# Patient Record
Sex: MALE | Employment: OTHER | URBAN - METROPOLITAN AREA
[De-identification: names, ages, dates, MRNs, and addresses within clinical notes are randomized per-mention and may not be internally consistent; named-entity substitution may affect disease eponyms.]

---

## 2020-06-10 RX ORDER — DILTIAZEM HYDROCHLORIDE 240 MG/1
240 CAPSULE, COATED, EXTENDED RELEASE ORAL DAILY
Qty: 90 CAPSULE | Refills: 1 | Status: SHIPPED | OUTPATIENT
Start: 2020-06-10 | End: 2020-08-31 | Stop reason: SDUPTHER

## 2020-06-10 RX ORDER — FENOFIBRATE 160 MG/1
160 TABLET ORAL DAILY
Qty: 90 TABLET | Refills: 1 | Status: SHIPPED | OUTPATIENT
Start: 2020-06-10 | End: 2021-01-13

## 2020-06-10 RX ORDER — ROSUVASTATIN CALCIUM 20 MG/1
20 TABLET, COATED ORAL DAILY
Qty: 90 TABLET | Refills: 1 | Status: SHIPPED | OUTPATIENT
Start: 2020-06-10 | End: 2020-08-31 | Stop reason: SDUPTHER

## 2020-06-16 ENCOUNTER — OFFICE VISIT (OUTPATIENT)
Dept: FAMILY MEDICINE CLINIC | Age: 75
End: 2020-06-16
Payer: MEDICARE

## 2020-06-16 VITALS
RESPIRATION RATE: 14 BRPM | WEIGHT: 250.4 LBS | TEMPERATURE: 97.5 F | SYSTOLIC BLOOD PRESSURE: 118 MMHG | DIASTOLIC BLOOD PRESSURE: 74 MMHG | HEART RATE: 65 BPM | HEIGHT: 71 IN | OXYGEN SATURATION: 96 % | BODY MASS INDEX: 35.06 KG/M2

## 2020-06-16 PROBLEM — Z96.642 STATUS POST TOTAL HIP REPLACEMENT, LEFT: Status: ACTIVE | Noted: 2018-03-01

## 2020-06-16 PROBLEM — F10.10 ALCOHOL ABUSE: Status: ACTIVE | Noted: 2020-06-16

## 2020-06-16 PROBLEM — I10 HYPERTENSION: Status: ACTIVE | Noted: 2018-01-15

## 2020-06-16 PROBLEM — E78.5 HYPERLIPIDEMIA: Status: ACTIVE | Noted: 2018-01-15

## 2020-06-16 PROBLEM — Z79.899 ENCOUNTER FOR LONG-TERM (CURRENT) USE OF MEDICATIONS: Status: ACTIVE | Noted: 2017-11-09

## 2020-06-16 PROCEDURE — 99214 OFFICE O/P EST MOD 30 MIN: CPT | Performed by: NURSE PRACTITIONER

## 2020-06-16 PROCEDURE — 93000 ELECTROCARDIOGRAM COMPLETE: CPT | Performed by: NURSE PRACTITIONER

## 2020-06-16 RX ORDER — CHOLECALCIFEROL (VITAMIN D3) 1250 MCG
50000 TABLET ORAL DAILY
COMMUNITY
End: 2021-05-11

## 2020-06-16 RX ORDER — METOPROLOL SUCCINATE 100 MG/1
100 TABLET, EXTENDED RELEASE ORAL NIGHTLY
COMMUNITY
Start: 2017-09-30 | End: 2020-08-31 | Stop reason: SDUPTHER

## 2020-06-16 RX ORDER — SOTALOL HYDROCHLORIDE 160 MG/1
160 TABLET ORAL
COMMUNITY
Start: 2017-09-18 | End: 2020-06-19 | Stop reason: ALTCHOICE

## 2020-06-16 RX ORDER — ESCITALOPRAM OXALATE 20 MG/1
20 TABLET ORAL DAILY
COMMUNITY
Start: 2017-09-30 | End: 2020-08-31 | Stop reason: SDUPTHER

## 2020-06-16 RX ORDER — BUPROPION HYDROCHLORIDE 75 MG/1
75 TABLET ORAL DAILY
Qty: 30 TABLET | Refills: 3 | Status: SHIPPED | OUTPATIENT
Start: 2020-06-16 | End: 2020-06-21

## 2020-06-16 RX ORDER — HYDROCHLOROTHIAZIDE 12.5 MG/1
12.5 TABLET ORAL DAILY
Status: ON HOLD | COMMUNITY
End: 2020-06-23 | Stop reason: HOSPADM

## 2020-06-16 RX ORDER — AMLODIPINE BESYLATE 5 MG/1
5 TABLET ORAL DAILY
COMMUNITY
Start: 2017-09-18 | End: 2020-08-31 | Stop reason: SDUPTHER

## 2020-06-16 SDOH — SOCIAL STABILITY: SOCIAL INSECURITY: WITHIN THE LAST YEAR, HAVE YOU BEEN HUMILIATED OR EMOTIONALLY ABUSED IN OTHER WAYS BY YOUR PARTNER OR EX-PARTNER?: NO

## 2020-06-16 SDOH — SOCIAL STABILITY: SOCIAL NETWORK: HOW OFTEN DO YOU ATTENT MEETINGS OF THE CLUB OR ORGANIZATION YOU BELONG TO?: 1 TO 4 TIMES PER YEAR

## 2020-06-16 SDOH — ECONOMIC STABILITY: FOOD INSECURITY: WITHIN THE PAST 12 MONTHS, YOU WORRIED THAT YOUR FOOD WOULD RUN OUT BEFORE YOU GOT MONEY TO BUY MORE.: NEVER TRUE

## 2020-06-16 SDOH — HEALTH STABILITY: PHYSICAL HEALTH: ON AVERAGE, HOW MANY MINUTES DO YOU ENGAGE IN EXERCISE AT THIS LEVEL?: 0 MIN

## 2020-06-16 SDOH — ECONOMIC STABILITY: FOOD INSECURITY: WITHIN THE PAST 12 MONTHS, THE FOOD YOU BOUGHT JUST DIDN'T LAST AND YOU DIDN'T HAVE MONEY TO GET MORE.: NEVER TRUE

## 2020-06-16 SDOH — ECONOMIC STABILITY: TRANSPORTATION INSECURITY
IN THE PAST 12 MONTHS, HAS THE LACK OF TRANSPORTATION KEPT YOU FROM MEDICAL APPOINTMENTS OR FROM GETTING MEDICATIONS?: NO

## 2020-06-16 SDOH — SOCIAL STABILITY: SOCIAL NETWORK: ARE YOU MARRIED, WIDOWED, DIVORCED, SEPARATED, NEVER MARRIED, OR LIVING WITH A PARTNER?: DIVORCED

## 2020-06-16 SDOH — ECONOMIC STABILITY: INCOME INSECURITY: HOW HARD IS IT FOR YOU TO PAY FOR THE VERY BASICS LIKE FOOD, HOUSING, MEDICAL CARE, AND HEATING?: NOT HARD AT ALL

## 2020-06-16 SDOH — HEALTH STABILITY: MENTAL HEALTH: HOW MANY STANDARD DRINKS CONTAINING ALCOHOL DO YOU HAVE ON A TYPICAL DAY?: 1 OR 2

## 2020-06-16 SDOH — SOCIAL STABILITY: SOCIAL INSECURITY
WITHIN THE LAST YEAR, HAVE YOU BEEN KICKED, HIT, SLAPPED, OR OTHERWISE PHYSICALLY HURT BY YOUR PARTNER OR EX-PARTNER?: NO

## 2020-06-16 SDOH — SOCIAL STABILITY: SOCIAL INSECURITY
WITHIN THE LAST YEAR, HAVE TO BEEN RAPED OR FORCED TO HAVE ANY KIND OF SEXUAL ACTIVITY BY YOUR PARTNER OR EX-PARTNER?: NO

## 2020-06-16 SDOH — SOCIAL STABILITY: SOCIAL NETWORK: HOW OFTEN DO YOU ATTEND CHURCH OR RELIGIOUS SERVICES?: 1 TO 4 TIMES PER YEAR

## 2020-06-16 SDOH — ECONOMIC STABILITY: TRANSPORTATION INSECURITY
IN THE PAST 12 MONTHS, HAS LACK OF TRANSPORTATION KEPT YOU FROM MEETINGS, WORK, OR FROM GETTING THINGS NEEDED FOR DAILY LIVING?: NO

## 2020-06-16 SDOH — HEALTH STABILITY: MENTAL HEALTH: HOW OFTEN DO YOU HAVE A DRINK CONTAINING ALCOHOL?: MONTHLY OR LESS

## 2020-06-16 SDOH — HEALTH STABILITY: PHYSICAL HEALTH: ON AVERAGE, HOW MANY DAYS PER WEEK DO YOU ENGAGE IN MODERATE TO STRENUOUS EXERCISE (LIKE A BRISK WALK)?: 0 DAYS

## 2020-06-16 SDOH — SOCIAL STABILITY: SOCIAL NETWORK
DO YOU BELONG TO ANY CLUBS OR ORGANIZATIONS SUCH AS CHURCH GROUPS UNIONS, FRATERNAL OR ATHLETIC GROUPS, OR SCHOOL GROUPS?: YES

## 2020-06-16 SDOH — SOCIAL STABILITY: SOCIAL INSECURITY: WITHIN THE LAST YEAR, HAVE YOU BEEN AFRAID OF YOUR PARTNER OR EX-PARTNER?: NO

## 2020-06-16 SDOH — HEALTH STABILITY: MENTAL HEALTH
STRESS IS WHEN SOMEONE FEELS TENSE, NERVOUS, ANXIOUS, OR CAN'T SLEEP AT NIGHT BECAUSE THEIR MIND IS TROUBLED. HOW STRESSED ARE YOU?: RATHER MUCH

## 2020-06-16 SDOH — SOCIAL STABILITY: SOCIAL NETWORK: HOW OFTEN DO YOU GET TOGETHER WITH FRIENDS OR RELATIVES?: ONCE A WEEK

## 2020-06-16 SDOH — SOCIAL STABILITY: SOCIAL NETWORK: IN A TYPICAL WEEK, HOW MANY TIMES DO YOU TALK ON THE PHONE WITH FAMILY, FRIENDS, OR NEIGHBORS?: ONCE A WEEK

## 2020-06-16 ASSESSMENT — PATIENT HEALTH QUESTIONNAIRE - PHQ9
SUM OF ALL RESPONSES TO PHQ QUESTIONS 1-9: 0
2. FEELING DOWN, DEPRESSED OR HOPELESS: 0
1. LITTLE INTEREST OR PLEASURE IN DOING THINGS: 0
SUM OF ALL RESPONSES TO PHQ9 QUESTIONS 1 & 2: 0
SUM OF ALL RESPONSES TO PHQ QUESTIONS 1-9: 0
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
## 2020-06-16 NOTE — PATIENT INSTRUCTIONS
It was my pleasure to meet with you today. Please contact me with any questions or concerns, and please notify myself or our manger if there is anyway we can improve our service in your health care needs. Below I have listed some instructions and information that pertain to today's visit.    -You have been advised to continue all current medication, otherwise not discussed in today's visit  -New medications and refills will been sent and made available at pharmacy or mail away  -Heathy daily diet to include healthy balanced diet with good portions of lean meats and vegetables  -Drink 6-8 glasses of water daily  -Continue daily exercise with in your physical capacity  -Continue regular follow up's with specialist including but not limited to: Cardiology  -Check your blood pressure as discussed      Patient Education        rivaroxaban  Pronunciation:  GIN a JOSE a ban  Brand:  Xarelto  What is the most important information I should know about rivaroxaban? Do not stop taking rivaroxaban without first talking to your doctor. Stopping suddenly can increase your risk of blood clot or stroke. Call your doctor at once if you have signs of bleeding such as: headaches, feeling very weak or dizzy, bleeding gums, nosebleeds, heavy menstrual periods or abnormal vaginal bleeding, blood in your urine, bloody or tarry stools, coughing up blood or vomit that looks like coffee grounds. Many other drugs can increase your risk of bleeding when used with rivaroxaban. Tell your doctor about all medicines you have recently used. Rivaroxaban can cause a very serious blood clot around your spinal cord if you undergo a spinal tap or receive spinal anesthesia (epidural). Tell any doctor who treats you that you are taking rivaroxaban. What is rivaroxaban? Rivaroxaban is used to treat or prevent blood clots in the legs (deep vein thrombosis) or in the lungs (pulmonary embolism).  These types of blood clots can occur after knee or hip replacement surgery, and during or after a stay in the hospital when you cannot move around as much as normal.  Rivaroxaban is sometimes used to lower your risk of a blood clot coming back after you have received treatment for blood clots for at least 6 months. Rivaroxaban is also used in people with atrial fibrillation (a heart rhythm disorder) to lower the risk of stroke caused by a blood clot. Rivaroxaban is also given together with aspirin to lower the risk of stroke, heart attack, or other serious heart problems in people with coronary artery disease (decreased blood flow to the heart) or peripheral artery disease (decreased blood flow to the legs). Rivaroxaban may also be used for purposes not listed in this medication guide. What should I discuss with my healthcare provider before taking rivaroxaban? You should not use rivaroxaban if you are allergic to it, or if you have active or uncontrolled bleeding. Rivaroxaban can cause a very serious blood clot around your spinal cord if you undergo a spinal tap or receive spinal anesthesia (epidural). This type of blood clot could cause long-term paralysis, and may be more likely to occur if:    · you have a genetic spinal defect;  · you have a spinal catheter in place;  · you have a history of spinal surgery or repeated spinal taps;  · you have recently had a spinal tap or epidural anesthesia;  · you are taking an NSAID--Advil, Aleve, Motrin, and others; or  · you are using other medicines to treat or prevent blood clots. Rivaroxaban may cause you to bleed more easily, especially if you have:   · a bleeding disorder that is inherited or caused by disease;  · hemorrhagic stroke;  · uncontrolled high blood pressure;  · stomach or intestinal bleeding or ulcer; or  · if you take certain medicines such as aspirin, enoxaparin, heparin, warfarin (Coumadin, Rafa Madelin), clopidogrel (Plavix), or certain antidepressants.   Tell your doctor if you have ever had:  · antiphospholipid syndrome (also called Villafana syndrome or \"sticky blood syndrome\"), an immune system disorder that increases the risk of blood clots;  · an artificial heart valve; or  · liver or kidney disease. Taking rivaroxaban during pregnancy may cause bleeding in the mother or the unborn baby. Tell your doctor if you are pregnant or plan to become pregnant. It may not be safe to breastfeed a baby while you are using this medicine. Ask your doctor about any risks. How should I take rivaroxaban? Follow all directions on your prescription label and read all medication guides or instruction sheets. Your doctor may occasionally change your dose. Use the medicine exactly as directed. The number of times you take rivaroxaban each day will depend on the reason you are using this medication. For some conditions, rivaroxaban should be taken with food. Whether you take the medicine with or without food may also depend on the tablet strength you take. Follow your doctor's dosing instructions very carefully. Tell your doctor if you have trouble swallowing a rivaroxaban tablet. Tell any doctor who treats you that you are using rivaroxaban. If you need surgery or dental work, tell the surgeon or dentist ahead of time that you are using this medication. If you need anesthesia for a medical procedure or surgery, you may need to stop using rivaroxaban for a short time. Do not change your dose or stop taking this medication without first talking to your doctor. Stopping suddenly can increase your risk of blood clot or stroke. Store at room temperature away from moisture and heat. What happens if I miss a dose? If you take rivaroxaban 1 time each day: Take the medicine as soon as you remember, and then go back to your regular schedule. Do not take two doses in the same day. If you take the 2.5-milligram tablet 2 times each day: Skip the missed dose and take your next dose at the regular time.   If you take the the medications less effective. Other drugs may affect rivaroxaban, including prescription and over-the-counter medicines, vitamins, and herbal products. Tell your doctor about all your current medicines and any medicine you start or stop using. Where can I get more information? Your pharmacist can provide more information about rivaroxaban. Remember, keep this and all other medicines out of the reach of children, never share your medicines with others, and use this medication only for the indication prescribed. Every effort has been made to ensure that the information provided by [address] is accurate, up-to-date, and complete, but no guarantee is made to that effect. Drug information contained herein may be time sensitive. [facility] information has been compiled for use by healthcare practitioners and consumers in the [place] and therefore [facility] does not warrant that uses outside of the [place] are appropriate, unless specifically indicated otherwise. [facility]'s drug information does not endorse drugs, diagnose patients or recommend therapy. [facility] drug information is an informational resource designed to assist licensed healthcare practitioners in caring for their patients and/or to serve consumers viewing this service as a supplement to, and not a substitute for, the expertise, skill, knowledge and judgment of healthcare practitioners. The absence of a warning for a given drug or drug combination in no way should be construed to indicate that the drug or drug combination is safe, effective or appropriate for any given patient. [facility] does not assume any responsibility for any aspect of healthcare administered with the aid of information [facility] provides. The information contained herein is not intended to cover all possible uses, directions, precautions, warnings, drug interactions, allergic reactions, or adverse effects.  If you have questions about the drugs you are taking, check with your doctor, nurse or pharmacist.  Copyright 1945-3571 [facility] [employer]. Version: 7.02. Revision date: 10/24/2019. Care instructions adapted under license by [facility] ([facility]). If you have questions about a medical condition or this instruction, always ask your healthcare professional. [place] [address] any warranty or liability for your use of this information.

## 2020-06-19 PROBLEM — Z98.890 STATUS POST CATHETER ABLATION OF ATRIAL FIBRILLATION: Status: ACTIVE | Noted: 2020-06-19

## 2020-06-19 PROBLEM — Z79.01 LONG TERM (CURRENT) USE OF ANTICOAGULANTS: Status: ACTIVE | Noted: 2020-06-19

## 2020-06-19 PROBLEM — Z79.899 LONG TERM CURRENT USE OF ANTIARRHYTHMIC DRUG: Status: ACTIVE | Noted: 2020-06-19

## 2020-06-21 ENCOUNTER — NURSE TRIAGE (OUTPATIENT)
Dept: OTHER | Age: 75
End: 2020-06-21

## 2020-06-21 ENCOUNTER — APPOINTMENT (OUTPATIENT)
Dept: GENERAL RADIOLOGY | Age: 75
DRG: 293 | End: 2020-06-21
Payer: MEDICARE

## 2020-06-21 ENCOUNTER — HOSPITAL ENCOUNTER (INPATIENT)
Age: 75
LOS: 2 days | Discharge: HOME | DRG: 293 | End: 2020-06-23
Attending: SPECIALIST | Admitting: INTERNAL MEDICINE
Payer: MEDICARE

## 2020-06-21 ENCOUNTER — APPOINTMENT (OUTPATIENT)
Dept: CT IMAGING | Age: 75
DRG: 293 | End: 2020-06-21
Payer: MEDICARE

## 2020-06-21 PROBLEM — R09.02 HYPOXIA: Status: ACTIVE | Noted: 2020-06-21

## 2020-06-21 PROBLEM — I50.9 ACUTE CHF (CONGESTIVE HEART FAILURE) (HCC): Status: ACTIVE | Noted: 2020-06-21

## 2020-06-21 PROBLEM — J18.9 PNEUMONIA OF BOTH LOWER LOBES DUE TO INFECTIOUS ORGANISM: Status: ACTIVE | Noted: 2020-06-21

## 2020-06-21 LAB
ABSOLUTE EOS #: 0 K/UL (ref 0–0.4)
ABSOLUTE IMMATURE GRANULOCYTE: ABNORMAL K/UL (ref 0–0.3)
ABSOLUTE LYMPH #: 2.1 K/UL (ref 1–4.8)
ABSOLUTE MONO #: 0.8 K/UL (ref 0.1–1.2)
ALBUMIN SERPL-MCNC: 3.1 G/DL (ref 3.5–5.2)
ALBUMIN/GLOBULIN RATIO: 1.2 (ref 1–2.5)
ALP BLD-CCNC: 81 U/L (ref 40–129)
ALT SERPL-CCNC: 19 U/L (ref 5–41)
ANION GAP SERPL CALCULATED.3IONS-SCNC: 15 MMOL/L (ref 9–17)
AST SERPL-CCNC: 32 U/L
BASOPHILS # BLD: 0 % (ref 0–2)
BASOPHILS ABSOLUTE: 0 K/UL (ref 0–0.2)
BILIRUB SERPL-MCNC: 0.67 MG/DL (ref 0.3–1.2)
BNP INTERPRETATION: ABNORMAL
BUN BLDV-MCNC: 21 MG/DL (ref 8–23)
BUN/CREAT BLD: ABNORMAL (ref 9–20)
CALCIUM SERPL-MCNC: 8.9 MG/DL (ref 8.6–10.4)
CHLORIDE BLD-SCNC: 97 MMOL/L (ref 98–107)
CO2: 20 MMOL/L (ref 20–31)
CREAT SERPL-MCNC: 0.93 MG/DL (ref 0.7–1.2)
D-DIMER QUANTITATIVE: 0.6 MG/L FEU
DIFFERENTIAL TYPE: ABNORMAL
EOSINOPHILS RELATIVE PERCENT: 0 % (ref 1–4)
GFR AFRICAN AMERICAN: >60 ML/MIN
GFR NON-AFRICAN AMERICAN: >60 ML/MIN
GFR SERPL CREATININE-BSD FRML MDRD: ABNORMAL ML/MIN/{1.73_M2}
GFR SERPL CREATININE-BSD FRML MDRD: ABNORMAL ML/MIN/{1.73_M2}
GLUCOSE BLD-MCNC: 136 MG/DL (ref 70–99)
HCT VFR BLD CALC: 39.8 % (ref 41–53)
HEMOGLOBIN: 13.6 G/DL (ref 13.5–17.5)
IMMATURE GRANULOCYTES: ABNORMAL %
LACTIC ACID, WHOLE BLOOD: ABNORMAL MMOL/L (ref 0.7–2.1)
LACTIC ACID: 2.3 MMOL/L (ref 0.5–2.2)
LYMPHOCYTES # BLD: 20 % (ref 24–44)
MCH RBC QN AUTO: 37.2 PG (ref 26–34)
MCHC RBC AUTO-ENTMCNC: 34.1 G/DL (ref 31–37)
MCV RBC AUTO: 108.9 FL (ref 80–100)
MONOCYTES # BLD: 8 % (ref 2–11)
NRBC AUTOMATED: ABNORMAL PER 100 WBC
PDW BLD-RTO: 14.7 % (ref 12.5–15.4)
PLATELET # BLD: 239 K/UL (ref 140–450)
PLATELET ESTIMATE: ABNORMAL
PMV BLD AUTO: 10 FL (ref 6–12)
POTASSIUM SERPL-SCNC: 3.9 MMOL/L (ref 3.7–5.3)
PRO-BNP: 3628 PG/ML
RBC # BLD: 3.65 M/UL (ref 4.5–5.9)
RBC # BLD: ABNORMAL 10*6/UL
SARS-COV-2, PCR: NORMAL
SARS-COV-2, RAPID: NOT DETECTED
SARS-COV-2: NORMAL
SEG NEUTROPHILS: 72 % (ref 36–66)
SEGMENTED NEUTROPHILS ABSOLUTE COUNT: 7.4 K/UL (ref 1.8–7.7)
SODIUM BLD-SCNC: 132 MMOL/L (ref 135–144)
SOURCE: NORMAL
TOTAL PROTEIN: 5.6 G/DL (ref 6.4–8.3)
TROPONIN INTERP: NORMAL
TROPONIN T: NORMAL NG/ML
TROPONIN, HIGH SENSITIVITY: 21 NG/L (ref 0–22)
WBC # BLD: 10.3 K/UL (ref 3.5–11)
WBC # BLD: ABNORMAL 10*3/UL

## 2020-06-21 PROCEDURE — 71260 CT THORAX DX C+: CPT

## 2020-06-21 PROCEDURE — 85025 COMPLETE CBC W/AUTO DIFF WBC: CPT

## 2020-06-21 PROCEDURE — 2580000003 HC RX 258: Performed by: NURSE PRACTITIONER

## 2020-06-21 PROCEDURE — 2060000000 HC ICU INTERMEDIATE R&B

## 2020-06-21 PROCEDURE — U0002 COVID-19 LAB TEST NON-CDC: HCPCS

## 2020-06-21 PROCEDURE — 6360000002 HC RX W HCPCS: Performed by: SPECIALIST

## 2020-06-21 PROCEDURE — 83880 ASSAY OF NATRIURETIC PEPTIDE: CPT

## 2020-06-21 PROCEDURE — 85379 FIBRIN DEGRADATION QUANT: CPT

## 2020-06-21 PROCEDURE — 93005 ELECTROCARDIOGRAM TRACING: CPT | Performed by: SPECIALIST

## 2020-06-21 PROCEDURE — 6360000004 HC RX CONTRAST MEDICATION: Performed by: SPECIALIST

## 2020-06-21 PROCEDURE — 87040 BLOOD CULTURE FOR BACTERIA: CPT

## 2020-06-21 PROCEDURE — 36415 COLL VENOUS BLD VENIPUNCTURE: CPT

## 2020-06-21 PROCEDURE — 80053 COMPREHEN METABOLIC PANEL: CPT

## 2020-06-21 PROCEDURE — 99285 EMERGENCY DEPT VISIT HI MDM: CPT

## 2020-06-21 PROCEDURE — 6360000002 HC RX W HCPCS: Performed by: NURSE PRACTITIONER

## 2020-06-21 PROCEDURE — 6360000002 HC RX W HCPCS

## 2020-06-21 PROCEDURE — 2580000003 HC RX 258: Performed by: SPECIALIST

## 2020-06-21 PROCEDURE — 71045 X-RAY EXAM CHEST 1 VIEW: CPT

## 2020-06-21 PROCEDURE — 83605 ASSAY OF LACTIC ACID: CPT

## 2020-06-21 PROCEDURE — 84145 PROCALCITONIN (PCT): CPT

## 2020-06-21 PROCEDURE — 84484 ASSAY OF TROPONIN QUANT: CPT

## 2020-06-21 PROCEDURE — 6370000000 HC RX 637 (ALT 250 FOR IP): Performed by: NURSE PRACTITIONER

## 2020-06-21 RX ORDER — ASPIRIN 81 MG/1
81 TABLET ORAL DAILY
COMMUNITY
End: 2021-05-11 | Stop reason: ALTCHOICE

## 2020-06-21 RX ORDER — ACETAMINOPHEN 325 MG/1
650 TABLET ORAL EVERY 6 HOURS PRN
Status: DISCONTINUED | OUTPATIENT
Start: 2020-06-21 | End: 2020-06-23 | Stop reason: HOSPADM

## 2020-06-21 RX ORDER — DILTIAZEM HYDROCHLORIDE 120 MG/1
240 CAPSULE, COATED, EXTENDED RELEASE ORAL DAILY
Status: DISCONTINUED | OUTPATIENT
Start: 2020-06-22 | End: 2020-06-23 | Stop reason: HOSPADM

## 2020-06-21 RX ORDER — SODIUM CHLORIDE 0.9 % (FLUSH) 0.9 %
10 SYRINGE (ML) INJECTION EVERY 12 HOURS SCHEDULED
Status: DISCONTINUED | OUTPATIENT
Start: 2020-06-21 | End: 2020-06-23 | Stop reason: HOSPADM

## 2020-06-21 RX ORDER — 0.9 % SODIUM CHLORIDE 0.9 %
80 INTRAVENOUS SOLUTION INTRAVENOUS ONCE
Status: COMPLETED | OUTPATIENT
Start: 2020-06-21 | End: 2020-06-21

## 2020-06-21 RX ORDER — IPRATROPIUM BROMIDE AND ALBUTEROL SULFATE 2.5; .5 MG/3ML; MG/3ML
1 SOLUTION RESPIRATORY (INHALATION)
Status: DISCONTINUED | OUTPATIENT
Start: 2020-06-22 | End: 2020-06-22

## 2020-06-21 RX ORDER — SODIUM CHLORIDE 0.9 % (FLUSH) 0.9 %
10 SYRINGE (ML) INJECTION PRN
Status: DISCONTINUED | OUTPATIENT
Start: 2020-06-21 | End: 2020-06-23 | Stop reason: HOSPADM

## 2020-06-21 RX ORDER — AZITHROMYCIN 500 MG/1
INJECTION, POWDER, LYOPHILIZED, FOR SOLUTION INTRAVENOUS
Status: COMPLETED
Start: 2020-06-21 | End: 2020-06-21

## 2020-06-21 RX ORDER — ACETAMINOPHEN 650 MG/1
650 SUPPOSITORY RECTAL EVERY 6 HOURS PRN
Status: DISCONTINUED | OUTPATIENT
Start: 2020-06-21 | End: 2020-06-23 | Stop reason: HOSPADM

## 2020-06-21 RX ORDER — METOPROLOL SUCCINATE 100 MG/1
100 TABLET, EXTENDED RELEASE ORAL NIGHTLY
Status: DISCONTINUED | OUTPATIENT
Start: 2020-06-22 | End: 2020-06-23 | Stop reason: HOSPADM

## 2020-06-21 RX ORDER — PROMETHAZINE HYDROCHLORIDE 25 MG/1
12.5 TABLET ORAL EVERY 6 HOURS PRN
Status: DISCONTINUED | OUTPATIENT
Start: 2020-06-21 | End: 2020-06-23 | Stop reason: HOSPADM

## 2020-06-21 RX ORDER — AMLODIPINE BESYLATE 5 MG/1
5 TABLET ORAL DAILY
Status: DISCONTINUED | OUTPATIENT
Start: 2020-06-22 | End: 2020-06-23 | Stop reason: HOSPADM

## 2020-06-21 RX ORDER — ONDANSETRON 2 MG/ML
4 INJECTION INTRAMUSCULAR; INTRAVENOUS EVERY 6 HOURS PRN
Status: DISCONTINUED | OUTPATIENT
Start: 2020-06-21 | End: 2020-06-22

## 2020-06-21 RX ORDER — ESCITALOPRAM OXALATE 10 MG/1
20 TABLET ORAL DAILY
Status: DISCONTINUED | OUTPATIENT
Start: 2020-06-22 | End: 2020-06-23 | Stop reason: HOSPADM

## 2020-06-21 RX ORDER — ALBUTEROL SULFATE 2.5 MG/3ML
2.5 SOLUTION RESPIRATORY (INHALATION)
Status: DISCONTINUED | OUTPATIENT
Start: 2020-06-21 | End: 2020-06-23 | Stop reason: HOSPADM

## 2020-06-21 RX ORDER — NICOTINE 21 MG/24HR
1 PATCH, TRANSDERMAL 24 HOURS TRANSDERMAL DAILY PRN
Status: DISCONTINUED | OUTPATIENT
Start: 2020-06-21 | End: 2020-06-23 | Stop reason: HOSPADM

## 2020-06-21 RX ORDER — FENOFIBRATE 54 MG/1
160 TABLET ORAL DAILY
Status: DISCONTINUED | OUTPATIENT
Start: 2020-06-22 | End: 2020-06-23 | Stop reason: HOSPADM

## 2020-06-21 RX ORDER — POLYETHYLENE GLYCOL 3350 17 G/17G
17 POWDER, FOR SOLUTION ORAL DAILY PRN
Status: DISCONTINUED | OUTPATIENT
Start: 2020-06-21 | End: 2020-06-23 | Stop reason: HOSPADM

## 2020-06-21 RX ORDER — FUROSEMIDE 10 MG/ML
40 INJECTION INTRAMUSCULAR; INTRAVENOUS ONCE
Status: COMPLETED | OUTPATIENT
Start: 2020-06-21 | End: 2020-06-21

## 2020-06-21 RX ORDER — CHOLECALCIFEROL (VITAMIN D3) 125 MCG
500 CAPSULE ORAL DAILY
COMMUNITY
End: 2022-07-26 | Stop reason: SDUPTHER

## 2020-06-21 RX ORDER — ROSUVASTATIN CALCIUM 20 MG/1
20 TABLET, COATED ORAL DAILY
Status: DISCONTINUED | OUTPATIENT
Start: 2020-06-21 | End: 2020-06-23 | Stop reason: HOSPADM

## 2020-06-21 RX ADMIN — CEFTRIAXONE SODIUM 1 G: 1 INJECTION, POWDER, FOR SOLUTION INTRAMUSCULAR; INTRAVENOUS at 18:42

## 2020-06-21 RX ADMIN — APIXABAN 5 MG: 5 TABLET, FILM COATED ORAL at 21:48

## 2020-06-21 RX ADMIN — SODIUM CHLORIDE 80 ML: 9 INJECTION, SOLUTION INTRAVENOUS at 16:00

## 2020-06-21 RX ADMIN — Medication 10 ML: at 21:58

## 2020-06-21 RX ADMIN — IOVERSOL 75 ML: 741 INJECTION INTRA-ARTERIAL; INTRAVENOUS at 16:00

## 2020-06-21 RX ADMIN — FUROSEMIDE 40 MG: 10 INJECTION, SOLUTION INTRAMUSCULAR; INTRAVENOUS at 21:48

## 2020-06-21 RX ADMIN — Medication 10 ML: at 16:01

## 2020-06-21 RX ADMIN — AZITHROMYCIN MONOHYDRATE 500 MG: 500 INJECTION, POWDER, LYOPHILIZED, FOR SOLUTION INTRAVENOUS at 21:47

## 2020-06-21 ASSESSMENT — PAIN SCALES - GENERAL: PAINLEVEL_OUTOF10: 0

## 2020-06-21 NOTE — ED NOTES
Report called to floor. Advised that Rocephin is hanging and that Zithromax is due after.        Arlene Winters RN  06/21/20 Yash Malin RN  06/21/20 [id number]

## 2020-06-21 NOTE — ED NOTES
Male patient to ED via w/c with shortness of breath and atrial fib (known), relates to feeling short of breath for the last 4 days, saw PCP on Tuesday and they stopped his Solodol, saw cardiologist on Friday and they started him on a blood thinner, history of cardiac ablation 7 years ago, denies chest pain or lower extremity edema,, relates to chronic cough for the last 10 years, resp slightly labored, skin pale warm and dry, patient is calm and cooperative, here for evaluation      Mars Bolanos RN  06/21/20 [id number]

## 2020-06-21 NOTE — ED NOTES
Pt denies chest pain at this time but has increasing trouble breathing.   pt assisted to more comfortable position     Shelley Art RN  06/21/20 [id number]

## 2020-06-21 NOTE — TELEPHONE ENCOUNTER
Patient has been discontinued off his Sotalol, and started on Eliquis per cardiology. Due to worsening symptoms over weekend patient has been directed to go to [facility] ER for evaluation. He will also hold any further Wellbutrin at this time and will discuss when cardiac issues resolved.

## 2020-06-21 NOTE — ED NOTES
Resting quietly on cart, denies any shortness of breath at this time, skin color pink warm and dry, denies any needs at this time     Luke Zavala RN  06/21/20 [id number]

## 2020-06-21 NOTE — ED PROVIDER NOTES
[address]      Pt Name: Noemi Reich  MRN: [id number]  [place] 1945  Date of evaluation: 6/21/20      CHIEF COMPLAINT       Chief Complaint   Patient presents with    Shortness of Breath         HISTORY OF PRESENT ILLNESS    Noemi Reich is a 76 y.o. male who presents to the emergency department for evaluation of shortness of breath and atrial fibrillation. Patient has been having shortness of breath upon mild to moderate exertion for last 4 days which has been progressively worsening. He admits to having cough which is chronic in nature and patient attributes to post sinus drainage. He denies any chest pain, abdominal pain, swelling in the legs or calf pain. He denies any lightheadedness, dizziness, palpitations or diaphoresis. Patient has history of atrial fibrillation and has undergone ablation about 7 years ago. He was seen by his primary care physician 5 days ago when sotalol was discontinued and patient was asked to follow-up with his cardiologist Dr. Larry Turpin. Patient saw his cardiologist 2 days ago when he was started on Eliquis twice daily. Patient denies any history of asthma or COPD and does not use any oxygen at home. Patient is non-smoker. REVIEW OF SYSTEMS       Review of Systems    All systems reviewed and negative unless noted in HPI. The patient denies fever or constitutional symptoms. Denies vision change. Denies any sore throat or rhinorrhea. Denies any neck pain or stiffness. Denies chest pain, palpitations or diaphoresis. No nausea,  vomiting or diarrhea. Denies any dysuria. Denies urinary frequency or hematuria. Denies musculoskeletal injury or pain. Denies any weakness, numbness or focal neurologic deficit. Denies any skin rash or edema. No recent psychiatric issues. No easy bruising or bleeding. Denies any polyuria, polydypsia or history of immunocompromise.       PAST MEDICAL HISTORY    has a past medical history of A-fib ([facility] [address] Anxiety, Arrhythmia, Depression, Hyperlipidemia, Hypertension, and Obesity. SURGICAL HISTORY      has a past surgical history that includes joint replacement (Left); Tonsillectomy; and Atrial ablation surgery (2013). CURRENT MEDICATIONS       Previous Medications    AMLODIPINE (NORVASC) 5 MG TABLET    Take 5 mg by mouth daily    APIXABAN (ELIQUIS) 5 MG TABS TABLET    Take 1 tablet by mouth 2 times daily    CHOLECALCIFEROL (DIALYVITE VITAMIN D3 MAX) 1.25 MG (30680 UT) TABS    Take 50,000 Units by mouth daily    DILTIAZEM (CARDIZEM CD) 240 MG EXTENDED RELEASE CAPSULE    Take 1 capsule by mouth daily    ESCITALOPRAM (LEXAPRO) 20 MG TABLET    Take 20 mg by mouth daily    FENOFIBRATE (TRIGLIDE) 160 MG TABLET    Take 1 tablet by mouth daily    HYDROCHLOROTHIAZIDE (HYDRODIURIL) 12.5 MG TABLET    Take 12.5 mg by mouth daily    METOPROLOL SUCCINATE (TOPROL XL) 100 MG EXTENDED RELEASE TABLET    Take 100 mg by mouth nightly    ROSUVASTATIN (CRESTOR) 20 MG TABLET    Take 1 tablet by mouth daily       ALLERGIES     has No Known Allergies. FAMILY HISTORY     He indicated that his mother is . He indicated that his father is . He indicated that his sister is alive. He indicated that all of his three brothers are alive. He indicated that his maternal grandmother is . He indicated that his maternal grandfather is . He indicated that his paternal grandmother is . He indicated that his paternal grandfather is . family history includes No Known Problems in his father; Stroke in his mother; Thyroid Disease in his mother. SOCIAL HISTORY      reports that he has never smoked. He has never used smokeless tobacco. He reports current alcohol use. He reports that he does not use drugs. PHYSICAL EXAM     INITIAL VITALS:  height is 5' 11\" (1.803 m) and weight is 113.4 kg (250 lb). His oral temperature is 97.3 °F (36.3 °C).  His blood pressure is acute abnormality in the visualized upper abdomen, although evaluation is limited by motion artifact. Soft Tissues/Bones: No acute osseous or soft tissue abnormality. Moderate degenerative changes in the thoracic spine. 1. No evidence of pulmonary embolism. 2. Small layering bilateral pleural effusions with bilateral lower lobe atelectasis and patchy consolidative opacities concerning for bilateral lower lobe pneumonia. Imaging features are atypical or uncommonly reported for viral pneumonia. Alternative diagnoses should be considered.  PneAty         LABS:  Results for orders placed or performed during the hospital encounter of 06/21/20   CBC Auto Differential   Result Value Ref Range    WBC 10.3 3.5 - 11.0 k/uL    RBC 3.65 (L) 4.5 - 5.9 m/uL    Hemoglobin 13.6 13.5 - 17.5 g/dL    Hematocrit 39.8 (L) 41 - 53 %    .9 (H) 80 - 100 fL    MCH 37.2 (H) 26 - 34 pg    MCHC 34.1 31 - 37 g/dL    RDW 14.7 12.5 - 15.4 %    Platelets 094 218 - 682 k/uL    MPV 10.0 6.0 - 12.0 fL    NRBC Automated NOT REPORTED per 100 WBC    Differential Type NOT REPORTED     Seg Neutrophils 72 (H) 36 - 66 %    Lymphocytes 20 (L) 24 - 44 %    Monocytes 8 2 - 11 %    Eosinophils % 0 (L) 1 - 4 %    Basophils 0 0 - 2 %    Immature Granulocytes NOT REPORTED 0 %    Segs Absolute 7.40 1.8 - 7.7 k/uL    Absolute Lymph # 2.10 1.0 - 4.8 k/uL    Absolute Mono # 0.80 0.1 - 1.2 k/uL    Absolute Eos # 0.00 0.0 - 0.4 k/uL    Basophils Absolute 0.00 0.0 - 0.2 k/uL    Absolute Immature Granulocyte NOT REPORTED 0.00 - 0.30 k/uL    WBC Morphology NOT REPORTED     RBC Morphology NOT REPORTED     Platelet Estimate NOT REPORTED    Comprehensive Metabolic Panel w/ Reflex to MG   Result Value Ref Range    Glucose 136 (H) 70 - 99 mg/dL    BUN 21 8 - 23 mg/dL    CREATININE 0.93 0.70 - 1.20 mg/dL    Bun/Cre Ratio NOT REPORTED 9 - 20    Calcium 8.9 8.6 - 10.4 mg/dL    Sodium 132 (L) 135 - 144 mmol/L    Potassium 3.9 3.7 - 5.3 mmol/L    Chloride 97 (L) 98 - 107 mmol/L    CO2 20 20 - 31 mmol/L    Anion Gap 15 9 - 17 mmol/L    Alkaline Phosphatase 81 40 - 129 U/L    ALT 19 5 - 41 U/L    AST 32 <40 U/L    Total Bilirubin 0.67 0.3 - 1.2 mg/dL    Total Protein 5.6 (L) 6.4 - 8.3 g/dL    Alb 3.1 (L) 3.5 - 5.2 g/dL    Albumin/Globulin Ratio 1.2 1.0 - 2.5    GFR Non-African American >60 >60 mL/min    GFR African American >60 >60 mL/min    GFR Comment          GFR Staging NOT REPORTED    Troponin   Result Value Ref Range    Troponin, High Sensitivity 21 0 - 22 ng/L    Troponin T NOT REPORTED <0.03 ng/mL    Troponin Interp NOT REPORTED    Brain Natriuretic Peptide   Result Value Ref Range    Pro-BNP 3,628 (H) <300 pg/mL    BNP Interpretation Pro-BNP Reference Range:    D-Dimer, Quantitative   Result Value Ref Range    D-Dimer, Quant 0.60 mg/L FEU   COVID-19   Result Value Ref Range    SARS-CoV-2          SARS-CoV-2, Rapid Not Detected Not Detected    Source . NASOPHARYNGEAL SWAB     SARS-CoV-2, PCR             Patient has mild hyponatremia, elevated BNP and d-dimer. EMERGENCY DEPARTMENT COURSE:   Vitals:    Vitals:    06/21/20 1414   BP: (!) 147/90   Pulse: 63   Resp: 24   Temp: 97.3 °F (36.3 °C)   TempSrc: Oral   SpO2: (!) 89%   Weight: 113.4 kg (250 lb)   Height: 5' 11\" (1.803 m)     -------------------------  BP: (!) 147/90, Temp: 97.3 °F (36.3 °C), Pulse: 63, Resp: 24    Orders Placed This Encounter   Medications    ioversol (OPTIRAY) 74 % injection 75 mL    0.9 % sodium chloride bolus    sodium chloride flush 0.9 % injection 10 mL    AND Linked Order Group     cefTRIAXone (ROCEPHIN) 1 g IVPB in 50 mL D5W minibag     azithromycin (ZITHROMAX) 500 mg in dextrose 5% 250 mL IVPB         During the emergency department course, patient was put on the monitor and supplement oxygen per nasal cannula. Saline Hep-Lock was started. After blood cultures were obtained, patient was given Rocephin and azithromycin IV piggyback.   I discussed the case with nurse practitioner covering for hospitalist group who kindly accepted the patient to be admitted on stepdown unit. Admission condition is stable. CONSULTS:  Nurse practitioner covering for hospitalist group    PROCEDURES:  None    FINAL IMPRESSION      1. Pneumonia due to organism          DISPOSITION/PLAN       PATIENT REFERRED TO:  Lucia Pena, APRN - CNP  [address] [facility] [id number]            DISCHARGE MEDICATIONS:  New Prescriptions    No medications on file       (Please note that portions of this note were completed with a voice recognition program.  Efforts were made to edit the dictations but occasionally words are mis-transcribed.)    Avila MD, F.A.C.E.P.   Attending Emergency Medicine Physician     Tanya Pulido MD  06/21/20 0935

## 2020-06-21 NOTE — ED NOTES
Pt up to floor per [facility].   Belongings with patient     Vernal Patient, RN  06/21/20 5554

## 2020-06-21 NOTE — ED NOTES
Pt up at bedside to use urinal.  Pt o2 sat decreased to 85 %. Pt assisted back to comfortable position in bed, oxygen turned up from 2 liters to 5 liters. o2 sat back to 92 %. Pt states he is \"much more comfortable now\"  Urine sent to lab per order.      Ingrid Mckeon RN  06/21/20 9446

## 2020-06-22 ENCOUNTER — APPOINTMENT (OUTPATIENT)
Dept: GENERAL RADIOLOGY | Age: 75
DRG: 293 | End: 2020-06-22
Payer: MEDICARE

## 2020-06-22 LAB
ANION GAP SERPL CALCULATED.3IONS-SCNC: 15 MMOL/L (ref 9–17)
BNP INTERPRETATION: ABNORMAL
BUN BLDV-MCNC: 18 MG/DL (ref 8–23)
BUN/CREAT BLD: ABNORMAL (ref 9–20)
CALCIUM SERPL-MCNC: 8.9 MG/DL (ref 8.6–10.4)
CHLORIDE BLD-SCNC: 98 MMOL/L (ref 98–107)
CHOLESTEROL/HDL RATIO: 2.9
CHOLESTEROL: 114 MG/DL
CO2: 23 MMOL/L (ref 20–31)
CREAT SERPL-MCNC: 0.91 MG/DL (ref 0.7–1.2)
EKG ATRIAL RATE: 60 BPM
EKG Q-T INTERVAL: 530 MS
EKG QRS DURATION: 132 MS
EKG QTC CALCULATION (BAZETT): 520 MS
EKG R AXIS: 77 DEGREES
EKG T AXIS: 30 DEGREES
EKG VENTRICULAR RATE: 58 BPM
GFR AFRICAN AMERICAN: >60 ML/MIN
GFR NON-AFRICAN AMERICAN: >60 ML/MIN
GFR SERPL CREATININE-BSD FRML MDRD: ABNORMAL ML/MIN/{1.73_M2}
GFR SERPL CREATININE-BSD FRML MDRD: ABNORMAL ML/MIN/{1.73_M2}
GLUCOSE BLD-MCNC: 105 MG/DL (ref 70–99)
HCT VFR BLD CALC: 38.5 % (ref 41–53)
HDLC SERPL-MCNC: 39 MG/DL
HEMOGLOBIN: 13.3 G/DL (ref 13.5–17.5)
LACTIC ACID, SEPSIS WHOLE BLOOD: NORMAL MMOL/L (ref 0.5–1.9)
LACTIC ACID, SEPSIS: 1.9 MMOL/L (ref 0.5–1.9)
LDL CHOLESTEROL: 53 MG/DL (ref 0–130)
LV EF: 65 %
LVEF MODALITY: NORMAL
MAGNESIUM: 1.3 MG/DL (ref 1.6–2.6)
MAGNESIUM: 2.1 MG/DL (ref 1.6–2.6)
MCH RBC QN AUTO: 37.3 PG (ref 26–34)
MCHC RBC AUTO-ENTMCNC: 34.5 G/DL (ref 31–37)
MCV RBC AUTO: 108.1 FL (ref 80–100)
NRBC AUTOMATED: ABNORMAL PER 100 WBC
PDW BLD-RTO: 14.5 % (ref 12.5–15.4)
PLATELET # BLD: 202 K/UL (ref 140–450)
PMV BLD AUTO: 9.5 FL (ref 6–12)
POTASSIUM SERPL-SCNC: 3.6 MMOL/L (ref 3.7–5.3)
POTASSIUM SERPL-SCNC: 4.3 MMOL/L (ref 3.7–5.3)
PRO-BNP: 3872 PG/ML
PROCALCITONIN: 0.06 NG/ML
RBC # BLD: 3.56 M/UL (ref 4.5–5.9)
SODIUM BLD-SCNC: 136 MMOL/L (ref 135–144)
TRIGL SERPL-MCNC: 110 MG/DL
TSH SERPL DL<=0.05 MIU/L-ACNC: 4.5 MIU/L (ref 0.3–5)
VLDLC SERPL CALC-MCNC: ABNORMAL MG/DL (ref 1–30)
WBC # BLD: 8.7 K/UL (ref 3.5–11)

## 2020-06-22 PROCEDURE — 6360000002 HC RX W HCPCS: Performed by: NURSE PRACTITIONER

## 2020-06-22 PROCEDURE — 6370000000 HC RX 637 (ALT 250 FOR IP): Performed by: NURSE PRACTITIONER

## 2020-06-22 PROCEDURE — 83735 ASSAY OF MAGNESIUM: CPT

## 2020-06-22 PROCEDURE — 94640 AIRWAY INHALATION TREATMENT: CPT

## 2020-06-22 PROCEDURE — 94664 DEMO&/EVAL PT USE INHALER: CPT

## 2020-06-22 PROCEDURE — 93306 TTE W/DOPPLER COMPLETE: CPT

## 2020-06-22 PROCEDURE — 97535 SELF CARE MNGMENT TRAINING: CPT

## 2020-06-22 PROCEDURE — 80061 LIPID PANEL: CPT

## 2020-06-22 PROCEDURE — 6370000000 HC RX 637 (ALT 250 FOR IP): Performed by: INTERNAL MEDICINE

## 2020-06-22 PROCEDURE — 83880 ASSAY OF NATRIURETIC PEPTIDE: CPT

## 2020-06-22 PROCEDURE — 97162 PT EVAL MOD COMPLEX 30 MIN: CPT

## 2020-06-22 PROCEDURE — 85027 COMPLETE CBC AUTOMATED: CPT

## 2020-06-22 PROCEDURE — 83605 ASSAY OF LACTIC ACID: CPT

## 2020-06-22 PROCEDURE — 84132 ASSAY OF SERUM POTASSIUM: CPT

## 2020-06-22 PROCEDURE — 94761 N-INVAS EAR/PLS OXIMETRY MLT: CPT

## 2020-06-22 PROCEDURE — 97166 OT EVAL MOD COMPLEX 45 MIN: CPT

## 2020-06-22 PROCEDURE — 36415 COLL VENOUS BLD VENIPUNCTURE: CPT

## 2020-06-22 PROCEDURE — APPSS180 APP SPLIT SHARED TIME > 60 MINUTES: Performed by: NURSE PRACTITIONER

## 2020-06-22 PROCEDURE — 2700000000 HC OXYGEN THERAPY PER DAY

## 2020-06-22 PROCEDURE — 80048 BASIC METABOLIC PNL TOTAL CA: CPT

## 2020-06-22 PROCEDURE — 2580000003 HC RX 258: Performed by: NURSE PRACTITIONER

## 2020-06-22 PROCEDURE — 71046 X-RAY EXAM CHEST 2 VIEWS: CPT

## 2020-06-22 PROCEDURE — 97530 THERAPEUTIC ACTIVITIES: CPT

## 2020-06-22 PROCEDURE — 99223 1ST HOSP IP/OBS HIGH 75: CPT | Performed by: INTERNAL MEDICINE

## 2020-06-22 PROCEDURE — 2060000000 HC ICU INTERMEDIATE R&B

## 2020-06-22 PROCEDURE — 84443 ASSAY THYROID STIM HORMONE: CPT

## 2020-06-22 RX ORDER — POTASSIUM CHLORIDE 20 MEQ/1
40 TABLET, EXTENDED RELEASE ORAL PRN
Status: DISCONTINUED | OUTPATIENT
Start: 2020-06-22 | End: 2020-06-23 | Stop reason: HOSPADM

## 2020-06-22 RX ORDER — FUROSEMIDE 10 MG/ML
40 INJECTION INTRAMUSCULAR; INTRAVENOUS 2 TIMES DAILY
Status: DISCONTINUED | OUTPATIENT
Start: 2020-06-22 | End: 2020-06-22

## 2020-06-22 RX ORDER — MAGNESIUM SULFATE 1 G/100ML
1 INJECTION INTRAVENOUS PRN
Status: DISCONTINUED | OUTPATIENT
Start: 2020-06-22 | End: 2020-06-23 | Stop reason: HOSPADM

## 2020-06-22 RX ORDER — FUROSEMIDE 10 MG/ML
40 INJECTION INTRAMUSCULAR; INTRAVENOUS DAILY
Status: DISCONTINUED | OUTPATIENT
Start: 2020-06-23 | End: 2020-06-22

## 2020-06-22 RX ORDER — FOLIC ACID 1 MG/1
1 TABLET ORAL DAILY
Status: DISCONTINUED | OUTPATIENT
Start: 2020-06-22 | End: 2020-06-23 | Stop reason: HOSPADM

## 2020-06-22 RX ORDER — POTASSIUM CHLORIDE 7.45 MG/ML
10 INJECTION INTRAVENOUS PRN
Status: DISCONTINUED | OUTPATIENT
Start: 2020-06-22 | End: 2020-06-23 | Stop reason: HOSPADM

## 2020-06-22 RX ORDER — THIAMINE MONONITRATE (VIT B1) 100 MG
100 TABLET ORAL DAILY
Status: DISCONTINUED | OUTPATIENT
Start: 2020-06-22 | End: 2020-06-23 | Stop reason: HOSPADM

## 2020-06-22 RX ORDER — FUROSEMIDE 20 MG/1
20 TABLET ORAL DAILY
Status: DISCONTINUED | OUTPATIENT
Start: 2020-06-23 | End: 2020-06-23

## 2020-06-22 RX ORDER — POTASSIUM CHLORIDE 20 MEQ/1
10 TABLET, EXTENDED RELEASE ORAL 2 TIMES DAILY
Status: DISCONTINUED | OUTPATIENT
Start: 2020-06-22 | End: 2020-06-23 | Stop reason: HOSPADM

## 2020-06-22 RX ORDER — FUROSEMIDE 10 MG/ML
40 INJECTION INTRAMUSCULAR; INTRAVENOUS DAILY
Status: DISCONTINUED | OUTPATIENT
Start: 2020-06-22 | End: 2020-06-23

## 2020-06-22 RX ORDER — IPRATROPIUM BROMIDE AND ALBUTEROL SULFATE 2.5; .5 MG/3ML; MG/3ML
1 SOLUTION RESPIRATORY (INHALATION) EVERY 4 HOURS PRN
Status: DISCONTINUED | OUTPATIENT
Start: 2020-06-22 | End: 2020-06-23 | Stop reason: HOSPADM

## 2020-06-22 RX ADMIN — AMLODIPINE BESYLATE 5 MG: 5 TABLET ORAL at 10:26

## 2020-06-22 RX ADMIN — ESCITALOPRAM OXALATE 20 MG: 10 TABLET ORAL at 08:17

## 2020-06-22 RX ADMIN — MAGNESIUM SULFATE IN DEXTROSE 1 G: 10 INJECTION, SOLUTION INTRAVENOUS at 05:04

## 2020-06-22 RX ADMIN — FOLIC ACID 1 MG: 1 TABLET ORAL at 10:25

## 2020-06-22 RX ADMIN — Medication 10 ML: at 21:23

## 2020-06-22 RX ADMIN — IPRATROPIUM BROMIDE AND ALBUTEROL SULFATE 1 AMPULE: .5; 3 SOLUTION RESPIRATORY (INHALATION) at 08:51

## 2020-06-22 RX ADMIN — APIXABAN 5 MG: 5 TABLET, FILM COATED ORAL at 08:17

## 2020-06-22 RX ADMIN — ROSUVASTATIN CALCIUM 20 MG: 20 TABLET, FILM COATED ORAL at 08:17

## 2020-06-22 RX ADMIN — MAGNESIUM SULFATE IN DEXTROSE 1 G: 10 INJECTION, SOLUTION INTRAVENOUS at 06:31

## 2020-06-22 RX ADMIN — MAGNESIUM SULFATE IN DEXTROSE 1 G: 10 INJECTION, SOLUTION INTRAVENOUS at 08:12

## 2020-06-22 RX ADMIN — FENOFIBRATE 162 MG: 54 TABLET ORAL at 08:16

## 2020-06-22 RX ADMIN — Medication 100 MG: at 17:47

## 2020-06-22 RX ADMIN — MAGNESIUM SULFATE IN DEXTROSE 1 G: 10 INJECTION, SOLUTION INTRAVENOUS at 04:00

## 2020-06-22 RX ADMIN — DRONEDARONE 400 MG: 400 TABLET, FILM COATED ORAL at 21:20

## 2020-06-22 RX ADMIN — METOPROLOL SUCCINATE 100 MG: 100 TABLET, EXTENDED RELEASE ORAL at 21:19

## 2020-06-22 RX ADMIN — POTASSIUM CHLORIDE 40 MEQ: 1500 TABLET, EXTENDED RELEASE ORAL at 10:30

## 2020-06-22 RX ADMIN — APIXABAN 5 MG: 5 TABLET, FILM COATED ORAL at 21:20

## 2020-06-22 RX ADMIN — POTASSIUM CHLORIDE 10 MEQ: 1500 TABLET, EXTENDED RELEASE ORAL at 21:20

## 2020-06-22 RX ADMIN — FUROSEMIDE 40 MG: 10 INJECTION, SOLUTION INTRAMUSCULAR; INTRAVENOUS at 10:22

## 2020-06-22 RX ADMIN — ALBUTEROL SULFATE 2.5 MG: 2.5 SOLUTION RESPIRATORY (INHALATION) at 02:46

## 2020-06-22 RX ADMIN — DILTIAZEM HYDROCHLORIDE 240 MG: 120 CAPSULE, COATED, EXTENDED RELEASE ORAL at 08:16

## 2020-06-22 SDOH — HEALTH STABILITY: MENTAL HEALTH: HOW MANY STANDARD DRINKS CONTAINING ALCOHOL DO YOU HAVE ON A TYPICAL DAY?: 3 OR 4

## 2020-06-22 ASSESSMENT — ENCOUNTER SYMPTOMS
DIARRHEA: 0
COUGH: 0
SHORTNESS OF BREATH: 0
STRIDOR: 0
WHEEZING: 0
NAUSEA: 0
SHORTNESS OF BREATH: 1
SORE THROAT: 0
RHINORRHEA: 0
CONSTIPATION: 0
VOMITING: 0
BLOOD IN STOOL: 0
DIARRHEA: 0
COUGH: 0
CONSTIPATION: 0
ABDOMINAL PAIN: 0

## 2020-06-22 ASSESSMENT — PAIN SCALES - GENERAL
PAINLEVEL_OUTOF10: 0

## 2020-06-22 NOTE — CONSULTS
20  Cardiology Consultation Note   Reason for Consult: We have been asked to evaluate this patient for congestive heart failure. Name:   Randy Hill :  1945   MRN:   [id number] Gender:   male   PCP:  ANDERSON Hillman CNP Age: 76 y.o. PCP Fax:  [phone] Attending Physician: Robson Hawk [facility]: [facility] Room Number: [id number]      History Obtained From:  patient, electronic medical record    Recommendations:   · OK to discharge home  · Start Multaq  · Follow up in the office in 3-5 days    Impression:   1. Atrial Fibrillation: Persistent atrial fibrillation with controlled ventricular rate and currently anticougulated. I plan to start Multaq. Will plan elective cardioversion in 2-3 weeks   2. Congestive heart failure:  Normal LV systolic function, volume overloaded, now resolved. At low dose diuretic (Lasix 20 mg daily or HCTZ 25 mg daily. 3. Hypertension:  Blood pressure is currently elevated, but it has been well controlled in the past.  I plan to continue current medications. No additional testing is required at this time.:    Clinical Summary:    76 y. o. male with a history of atrial fibrillation and prior ablation. Was found to be in atrial fibrillation by his PCP. He was seen in our office last week. Admitted with increasing shortness of breath. Now feels well after IV diuretics. I will add Multaq. He can be discharged on oral diuretics and follow up in our office in 3-5 days. Cardiovascular database &  testin-5 CV for PAF  2013 AFib Ablation ([facility])  2020 AFib- Sotalol 160 QD and QTc 495.  2020 Echocardiogram ([facility]):  Normal left ventricular size with normal function. Left ventricular ejection fraction 65 %. Mild concentric left ventricular hypertrophy. Normal RV size and function. RVSP 25 mmHg. Normal size LA and RA. No obvious structural valvular abnormality noted.   No significant pericardial effusion seen    History:   Code Status: Full Code     Allergies as of 06/21/2020    (No Known Allergies)       Prior to Admission medications    Medication Sig Start Date End Date Taking? Authorizing Provider   vitamin B-12 (CYANOCOBALAMIN) 500 MCG tablet Take 500 mcg by mouth daily   Yes Historical Provider, MD   aspirin EC 81 MG EC tablet Take 81 mg by mouth daily   Yes Historical Provider, MD   apixaban (ELIQUIS) 5 MG TABS tablet Take 1 tablet by mouth 2 times daily 6/19/20  Yes ANDERSON Henderson CNP   amLODIPine (NORVASC) 5 MG tablet Take 5 mg by mouth daily 9/18/17  Yes Historical Provider, MD   Cholecalciferol (DIALYVITE VITAMIN D3 MAX) 1.25 MG (31783 UT) TABS Take 50,000 Units by mouth daily   Yes Historical Provider, MD   escitalopram (LEXAPRO) 20 MG tablet Take 20 mg by mouth daily 9/30/17  Yes Historical Provider, MD   hydrochlorothiazide (HYDRODIURIL) 12.5 MG tablet Take 12.5 mg by mouth daily   Yes Historical Provider, MD   metoprolol succinate (TOPROL XL) 100 MG extended release tablet Take 100 mg by mouth nightly 9/30/17  Yes Historical Provider, MD   fenofibrate (TRIGLIDE) 160 MG tablet Take 1 tablet by mouth daily 6/10/20 6/10/21 Yes ANDERSON Purdy CNP   dilTIAZem (CARDIZEM CD) 240 MG extended release capsule Take 1 capsule by mouth daily 6/10/20 6/10/21 Yes ANDERSON Purdy CNP   rosuvastatin (CRESTOR) 20 MG tablet Take 1 tablet by mouth daily 6/10/20 6/10/21 Yes ANDERSON Purdy CNP       He  has a past medical history of A-fib ([facility] [address] Anxiety, Arrhythmia, Depression, Hyperlipidemia, Hypertension, and Obesity. He  has a past surgical history that includes joint replacement (Left); Tonsillectomy; and Atrial ablation surgery (2013).     Patient Active Problem List   Diagnosis    Hyperlipidemia    Essential hypertension    Status post total hip replacement, left    Encounter for long-term (current) use of medications    Alcohol abuse    Depression    PA[facility]ox[facility]) ([facility]    Status post catheter ablation of atrial fibrillation    Long term current use of antiarrhythmic drug    Long term (current) use of anticoagulants    Pneumonia of both lower lobes due to infectious organism ([facility]    Acute CHF (congestive heart failure) ([facility]    Hypoxia       He  reports that he has never smoked. He has never used smokeless tobacco. He reports current alcohol use of about 4.0 standard drinks of alcohol per week. He reports that he does not use drugs. He He indicated that his mother is . He indicated that his father is . He indicated that his sister is alive. He indicated that all of his three brothers are alive. He indicated that his maternal grandmother is . He indicated that his maternal grandfather is . He indicated that his paternal grandmother is . He indicated that his paternal grandfather is . His family history includes No Known Problems in his father; Stroke in his mother; Thyroid Disease in his mother.     Review of Systems  General: negative for chills, fever, night sweats, weight gain or weight loss  Psychological: negative for anxiety, depression or sleep disturbances  Ophthalmic: negative for blurry vision, decreased vision, eye pain or photophobia  ENT: negative for epistaxis, headaches, hearing change, tinnitus or vertigo  Allergy and Immunology: negative for hives, latex sensitivity or nasal congestion  Hematological and Lymphatic: negative for bleeding, clotting, bruising or jaundice  Endocrine: negative for hot flashes, polydipsia/polyuria or temperature intolerance  Breast: negative for new or changing breast lumps  Respiratory: no cough, shortness of breath, or wheezing  Cardiovascular: Noted above  Gastrointestinal: no abdominal pain, change in bowel habits, or black or bloody stools  Genito-Urinary: no dysuria, trouble voiding, or hematuria  Musculoskeletal: negative for - gait disturbance, joint stiffness or swelling or muscle pain  Neurological: no TIA or stroke symptoms  Dermatological: negative for hair changes, lumps, pruritus, rash or skin lesion changes     Physical Examination:     BP (!) 132/90   Pulse 65   Temp 97.7 °F (36.5 °C) (Oral)   Resp 20   Ht 5' 11\" (1.803 m)   Wt 247 lb 1.6 oz (112.1 kg)   SpO2 95%   BMI 34.46 kg/m²   CONSTITUTIONAL:  awake, alert, cooperative, no apparent distress  HEAD: Atraumatic, normocephalic  EYES:  Lids and lashes normal, pupils equal, round and reactive to light, extra ocular muscles intact, sclera clear, conjunctiva normal  ENT:  Normocephalic, without obvious abnormality, atraumatic, sinuses nontender on palpation, external ears without lesions, oral pharynx with moist mucus membranes, tonsils without erythema or exudates, gums normal and good dentition. NECK:  Supple, symmetrical, trachea midline, no adenopathy, no thyromegaly  LUNGS:  Diminished but otherwise clear to auscultation bilaterally, no crackles or wheezing   CARDIOVASCULAR:  Normal apical impulse, irregularly irregular rhythm, normal S1 and S2, no S3 or S4, and no murmur noted. No rubs. Normal pulses. No edema  ABDOMEN:  No scars, normal bowel sounds, soft, non-distended, non-tender, no masses palpated, no hepatosplenomegally  BACK:  Symmetric, no curvature, no tenderness on palpation, no CVA tenderness  MUSCULOSKELETAL:  There is no redness, warmth, or swelling of the joints. Full range of motion noted. Motor strength is 5 out of 5 all extremities bilaterally and tone is normal.  NEUROLOGIC:  Alert and oriented X 3. Cranial nerves II-XII are grossly intact. Motor is 5 out of 5 bilaterally. Sensory is intact. SKIN:  normal skin color, texture, turgor, no rashes and no lesions     Diagnostic Testing: Thank you very much for the kind referral.  If you have any questions or if I can be of further service, please don't hesitate to contact me.      Laverne Arndt MD, [facility] [place], [place]  Board certified in Internal Medicine, Cardiovascular Diseases and Interventional Cardiology(ABIM) and [facility] (ABPM)  Cell:   [phone]   Office:  [phone]       ____________________________________________  [facility]  [address], [address] [place] [address]  Tel:  [phone]      Fax:  [phone]  [email or web address]

## 2020-06-22 NOTE — PROGRESS NOTES
Physical Therapy    Facility/Department: [facility] SURG ICU  Initial Assessment    NAME: Rachael Lerma  : 1945  MRN: [id number]    Date of Service: 2020  Chief Complaint   Patient presents with    Shortness of Breath       Discharge Recommendations:  Continue to assess pending progress(Likely no further therapy required at discharge but will continue to assess)   PT Equipment Recommendations  Equipment Needed: No  Other: Pt already owns a single point cane- writer recommends pt use device at this time    Assessment   Body structures, Functions, Activity limitations: Decreased functional mobility ; Decreased strength;Decreased endurance;Decreased balance  Assessment: Pt with decreased endurance and overall mobility. Pt able to ambulate a functional distance with use of single point cane. Pt already owns a cane and writer recommends use of cane at discharge. Will continue to assess overall discharge needs but likely will not require any further therapy at discharge. Prognosis: Good  Decision Making: Medium Complexity  PT Education: Goals;PT Role;Plan of Care; Functional Mobility Training;Transfer Training;Gait Training  REQUIRES PT FOLLOW UP: Yes  Activity Tolerance  Activity Tolerance: Patient limited by fatigue;Patient limited by endurance       Patient Diagnosis(es): The encounter diagnosis was Pneumonia due to organism. has a past medical history of A-fib ([address] Anxiety, Arrhythmia, Depression, Hyperlipidemia, Hypertension, and Obesity. has a past surgical history that includes joint replacement (Left); Tonsillectomy; and Atrial ablation surgery (2013). Restrictions  Restrictions/Precautions  Restrictions/Precautions: General Precautions  Required Braces or Orthoses?: No  Position Activity Restriction  Other position/activity restrictions:  Up with assistance   Vision/Hearing  Vision: Within Functional Limits  Hearing: Within functional limits     Subjective  General  Patient assessed for rehabilitation services?: Yes  Response To Previous Treatment: Not applicable  Family / Caregiver Present: No  Follows Commands: Within Functional Limits  Subjective  Subjective: Pt supine in bed and agreeable to therapy this morning. RN agreeable to therapy as well. Pt denies any pain. Pain Screening  Patient Currently in Pain: Denies  Vital Signs  Patient Currently in Pain: Denies       Orientation  Orientation  Overall Orientation Status: Within Functional Limits  Social/Functional History  Social/Functional History  Lives With: Alone  Type of Home: House  Home Layout: Two level, Bed/Bath upstairs  Home Access: Stairs to enter with rails  Entrance Stairs - Number of Steps: 4-5  Entrance Stairs - Rails: Left  Bathroom Shower/Tub: Tub/Shower unit  Bathroom Toilet: Standard  Bathroom Equipment: Grab bars in shower  Bathroom Accessibility: Accessible  Home Equipment: [employer] Help From: Family  ADL Assistance: Independent  Homemaking Assistance: Independent  Homemaking Responsibilities: Yes  Meal Prep Responsibility: Primary  Laundry Responsibility: No(Cleaning lady assists with laundry)  Cleaning Responsibility: Secondary  Shopping Responsibility: Primary  Ambulation Assistance: Independent  Transfer Assistance: Independent  Active : Yes  Mode of Transportation: [facility]  Occupation: Full time employment  Type of occupation: Working out of his home currently  Leisure & Hobbies: Likes to garden and be outside taking care of his yard. Additional Comments: Pt's daughter lives across the street and is able to assist him if needed.    Cognition   Cognition  Overall Cognitive Status: WFL    Objective          AROM RLE (degrees)  RLE AROM: WFL  AROM LLE (degrees)  LLE AROM : WFL  AROM RUE (degrees)  RUE AROM : WFL  AROM LUE (degrees)  LUE AROM : WFL  Strength RLE  Strength RLE: WFL  Comment: grossly 5/5  Strength LLE  Strength LLE: Exception  L Hip Flexion: 4-/5  L Knee Flexion: 4+/5  L Knee Extension: Inpatient T-Scale Score : 40.78 (06/22/20 1209)  Mobility Inpatient CMS 0-100% Score: 54.16 (06/22/20 1209)  Mobility Inpatient CMS G-Code Modifier : CK (06/22/20 1209)          Goals  Short term goals  Time Frame for Short term goals: 14 visits  Short term goal 1: Pt to ambulate 300ft modified independently with least restrictive device to allow for return to prior functional level  Short term goal 2: Pt to sit <> stand transfer independently to allow for return to prior functional level  Short term goal 3: Pt to demonstrate independent bed mobility to allow for return to prior functional level  Short term goal 4: Pt to ascend/descend a flight of stairs with one rail modified independently to allow for access to bed/bath level of home  Short term goal 5: Pt to tolerate 30 minutes of therapy for endurance  Patient Goals   Patient goals : Pt would like to feel better overall       Therapy Time   Individual Concurrent Group Co-treatment   Time In 1116         Time Out 1148         Minutes 32         Timed Code Treatment Minutes: 100 [place],9D, PT

## 2020-06-22 NOTE — CARE COORDINATION
Case Management Initial Discharge Plan  Ozzy Schmidt,             Met with:patient to discuss discharge plans. Information verified: address, contacts, phone number, , insurance Yes    Emergency Contact/Next of Kin name & number: Shirin Calloway, daughter, [phone]    PCP: Sarah Bird, APRN - CNP  Date of last visit: last Tuesday    Insurance Provider: [employer] [place]    Discharge Planning    Living Arrangements:  Alone   Support Systems:  Children, Family Members    Home has 2 stories  5 stairs to climb to get into front door, 14stairs to climb to reach second floor  Location of bedroom/bathroom in home 2nd floor bedroom, can convert to 1st floor if needed    Patient able to perform ADL's:Independent    Current Services (outpatient & in home) none  DME equipment: BP cuff  DME provider:     Receiving oral anticoagulation therapy? Yes    If indicated:   Physician managing anticoagulation treatment: Dr. Manuel Enriquez  Where does patient obtain lab work for ATC treatment? N/a follows at Manuel Enriquez office       Potential Assistance Needed:  N/A    Patient agreeable to home care: No  [place] of choice provided:  n/a    Prior SNF/Rehab Placement and Facility: no  Agreeable to SNF/Rehab: No  [place] of choice provided: n/a     Evaluation: no    Expected Discharge date:  20    Patient expects to be discharged to:  home   Follow Up Appointment: Best Day/ Time: Wednesday AM    Transportation provider: self vs daughter  Transportation arrangements needed for discharge: No, family can take him home    Readmission Risk              Risk of Unplanned Readmission:        12             Does patient have a readmission risk score greater than 14?: No  If yes, follow-up appointment must be made within 7 days of discharge. Goals of Care: Wean from O2. Patient is currently on 2L of O2. He states he is feeling better than arrival to hospital. He is hoping to wean off today.        Discharge Plan: Plan to [place] home.              Electronically signed by Randi Collins RN, BSN on 6/22/20 at 8:14 AM EDT

## 2020-06-22 NOTE — PLAN OF CARE
Problem: Falls - Risk of:  Goal: Will remain free from falls  Description: Will remain free from falls  6/22/2020 1923 by Roberta Recio  Outcome: Ongoing  Goal: Absence of physical injury  Description: Absence of physical injury  6/22/2020 1923 by Roberta Recio  Outcome: Ongoing    Problem: Activity:  Goal: Capacity to carry out activities will improve  Description: Capacity to carry out activities will improve  6/22/2020 1923 by Roberta Recio  Outcome: Ongoing    Problem: Fluid Volume:  Goal: Risk for excess fluid volume will decrease  Description: Risk for excess fluid volume will decrease  6/22/2020 1923 by Roberta Recio  Outcome: Ongoing  Problem: Respiratory:  Goal: Ability to maintain adequate ventilation will improve  Description: Ability to maintain adequate ventilation will improve  6/22/2020 1923 by Roberta Recio  Outcome: Ongoing  Goal: Respiratory status will improve  Description: Respiratory status will improve  6/22/2020 1923 by Roberta Recio  Outcome: Ongoing  Goal: Maintenance of adequate hydration will improve  Description: Maintenance of adequate hydration will improve  6/22/2020 1923 by Roberta Recio  Outcome: Ongoing  Goal: Will show no signs and symptoms of electrolyte imbalance  Description: Will show no signs and symptoms of electrolyte imbalance  6/22/2020 1923 by Roberta Recio  Outcome: Ongoing     Patient is in bed with no needs at this time. Call light is within reach. Will continue to monitor and assess.

## 2020-06-22 NOTE — CONSULTS
· Current Nutrition Therapies:  · Oral Diet Orders: Cardiac   · Oral Diet intake: %(per pt)  · Oral Nutrition Supplement (ONS) Orders: None  · Anthropometric Measures:  · Ht: 5' 11\" (180.3 cm)   · Current Body Wt: 247 lb (112 kg)  · Admission Body Wt: 257 lb (116.6 kg)  · Usual Body Wt: 250 lb (113.4 kg)(per pt)  · Ideal Body Wt: 172 lb (78 kg), % Ideal Body 144%  · BMI Classification: BMI 30.0 - 34.9 Obese Class I    Nutrition Interventions:   Continue current diet  Continued Inpatient Monitoring, Education Completed    Nutrition Evaluation:   · Evaluation: Goals set   · Goals: PO to meet >75% of needs    · Monitoring: Meal Intake, I&O, Pertinent Labs      Electronically signed by Farida Velasquez RD, LD on 6/22/20 at 10:18 AM BISI Velasquez RD,LD  Clinical Dietitian  [facility]  [phone]

## 2020-06-22 NOTE — H&P
[facility]    HISTORY AND PHYSICAL EXAMINATION            Date:   6/22/2020  Patient name:  Ayesha Brantley  Date of admission:  6/21/2020  2:02 PM  MRN:   [id number]  Account:  [de-identified]  YOB: 1945  PCP:    ANDERSON Douglas CNP  Room:   324/324-01  Code Status:    Full Code    Chief Complaint:     Chief Complaint   Patient presents with    Shortness of Breath       History Obtained From:     patient    History of Present Illness:     Ayesha Brantley is a 76 y.o. Unavailable/unknown male who presents with Shortness of Breath   and is admitted to the hospital for the management of Pneumonia of both lower lobes due to infectious organism [facility]). The patient states he hasn't felt 'good' for a couple of weak's. He said saw his PCP on Tuesday related to general fatigue. He PCP discovered he was back in afib and sent him to see cardiology. He was seen by Corina Saenz CNP at Dr García Colunga office on Friday. They confirmed the afib, started him on Eliquis and stopped his sotalol. The patient states that Friday evening he started feeling sob with minimal activity that progressively worsened over the weekend. He states the sob was primarily with activity and would improve with rest. He denies chest pain, palpitations, edema,  fever or chills. He states he has a chronic cough from sinus drainage that hasn't changed. He has a history of atrial fib with ablation 7 yrs ago. Other history includes daily drinker, HTN, Hyperlipidemia, depression and anxiety. In the ER he was hypoxic after ambulating to the bathroom. His sat's dropped to 85% but recovered after rest on oxygen. He was afebrile. Chest xray showed questionable left lung base o[acity.  CT chest negative for PE, with Small layering bilateral pleural effusions with bilateral lower lobeatelectasis and patchy consolidative opacities concerning for bilateral lower lobe pneumonia.  Imaging features are atypical or uncommonly reported for viral pneumonia. BNP 3628, Sodium 132, CBC unremarkable. Lactic 2.3 repeat 1.9. D Dimer . 60. Blood cultures times 2 obtained. EKG showed a fib with slow ventricular response. He was started on Rocephin and Zithromax IV in the ER. After reviewing the patients information I consulted Dr Tiburcio James and gave the patient Lasix 40 mg IV for acute CHF. The patient diuresed approximately 4 L overnight and feels much better. Plan to continue lasix 40 mg IV for at least one more dose this morning. Past Medical History:     Past Medical History:   Diagnosis Date    A-fib ([facility] [address]     Anxiety     Arrhythmia     Depression     Hyperlipidemia     Hypertension     Obesity         Past Surgical History:     Past Surgical History:   Procedure Laterality Date    ATRIAL ABLATION SURGERY  2013    JOINT REPLACEMENT Left     TONSILLECTOMY          Medications Prior to Admission:     Prior to Admission medications    Medication Sig Start Date End Date Taking?  Authorizing Provider   vitamin B-12 (CYANOCOBALAMIN) 500 MCG tablet Take 500 mcg by mouth daily   Yes Historical Provider, MD   aspirin EC 81 MG EC tablet Take 81 mg by mouth daily   Yes Historical Provider, MD   apixaban (ELIQUIS) 5 MG TABS tablet Take 1 tablet by mouth 2 times daily 6/19/20  Yes ANDERSON Harkins - CNP   amLODIPine (NORVASC) 5 MG tablet Take 5 mg by mouth daily 9/18/17  Yes Historical Provider, MD   Cholecalciferol (DIALYVITE VITAMIN D3 MAX) 1.25 MG (46117 UT) TABS Take 50,000 Units by mouth daily   Yes Historical Provider, MD   escitalopram (LEXAPRO) 20 MG tablet Take 20 mg by mouth daily 9/30/17  Yes Historical Provider, MD   hydrochlorothiazide (HYDRODIURIL) 12.5 MG tablet Take 12.5 mg by mouth daily   Yes Historical Provider, MD   metoprolol succinate (TOPROL XL) 100 MG extended release tablet Take 100 mg by mouth nightly 9/30/17  Yes Historical Provider, MD   fenofibrate (TRIGLIDE) 160 MG tablet Take 1 tablet by mouth daily 6/10/20 6/10/21 Yes Lucia Becky, APRN - CNP   dilTIAZem (CARDIZEM CD) 240 MG extended release capsule Take 1 capsule by mouth daily 6/10/20 6/10/21 Yes Lucia Becky, APRN - CNP   rosuvastatin (CRESTOR) 20 MG tablet Take 1 tablet by mouth daily 6/10/20 6/10/21 Yes Lucia Becky, APRN - CNP        Allergies:     Patient has no known allergies. Social History:     Tobacco:    reports that he has never smoked. He has never used smokeless tobacco.  Alcohol:      reports current alcohol use of about 4.0 standard drinks of alcohol per week. Drug Use:  reports no history of drug use. Family History:     Family History   Problem Relation Age of Onset    Stroke Mother     Thyroid Disease Mother     No Known Problems Father        Review of Systems:     Positive and Negative as described in HPI. Review of Systems   Constitutional: Positive for fatigue. Negative for chills, diaphoresis and fever. HENT: Negative for congestion and hearing loss. Respiratory: Positive for shortness of breath. Negative for cough, wheezing and stridor. Cardiovascular: Negative for chest pain, palpitations and leg swelling. Gastrointestinal: Negative for abdominal pain, blood in stool, constipation, diarrhea, nausea and vomiting. Genitourinary: Negative for dysuria and frequency. Musculoskeletal: Negative for myalgias. Skin: Negative for rash. Neurological: Negative for dizziness, seizures and headaches. Psychiatric/Behavioral: The patient is not nervous/anxious. Physical Exam:   BP (!) 132/90   Pulse 65   Temp 97.7 °F (36.5 °C) (Oral)   Resp 20   Ht 5' 11\" (1.803 m)   Wt 247 lb 1.6 oz (112.1 kg)   SpO2 97%   BMI 34.46 kg/m²   Temp (24hrs), Av.7 °F (36.5 °C), Min:97.3 °F (36.3 °C), Max:98.6 °F (37 °C)    No results for input(s): POCGLU in the last 72 hours.     Intake/Output Summary (Last 24 hours) at 2020 3209  Last data filed at 2020 0132  Gross per 24 hour   Intake 535.68 ml   Output 4425 ml   Net -3889.32 ml       Physical Exam  Vitals signs and nursing note reviewed. HENT:      Mouth/Throat:      Mouth: Mucous membranes are moist.   Eyes:      Extraocular Movements: Extraocular movements intact. Pupils: Pupils are equal, round, and reactive to light. Neck:      Musculoskeletal: Normal range of motion. Cardiovascular:      Rate and Rhythm: Normal rate. Rhythm irregular. Pulses: Normal pulses. Heart sounds: Normal heart sounds. Pulmonary:      Effort: Pulmonary effort is normal.      Breath sounds: Normal breath sounds. Abdominal:      General: Bowel sounds are normal.      Palpations: Abdomen is soft. Musculoskeletal: Normal range of motion. Right lower leg: No edema. Left lower leg: No edema. Skin:     General: Skin is warm and dry. Capillary Refill: Capillary refill takes less than 2 seconds. Neurological:      Mental Status: He is alert. GCS: GCS eye subscore is 4. GCS verbal subscore is 5. GCS motor subscore is 6. Sensory: Sensation is intact. Psychiatric:         Mood and Affect: Mood normal.         Thought Content:  Thought content normal.         Investigations:      Laboratory Testing:  Recent Results (from the past 24 hour(s))   EKG 12 Lead    Collection Time: 06/21/20  2:15 PM   Result Value Ref Range    Ventricular Rate 58 BPM    Atrial Rate 60 BPM    QRS Duration 132 ms    Q-T Interval 530 ms    QTc Calculation (Bazett) 520 ms    R Axis 77 degrees    T Axis 30 degrees   CBC Auto Differential    Collection Time: 06/21/20  2:25 PM   Result Value Ref Range    WBC 10.3 3.5 - 11.0 k/uL    RBC 3.65 (L) 4.5 - 5.9 m/uL    Hemoglobin 13.6 13.5 - 17.5 g/dL    Hematocrit 39.8 (L) 41 - 53 %    .9 (H) 80 - 100 fL    MCH 37.2 (H) 26 - 34 pg    MCHC 34.1 31 - 37 g/dL    RDW 14.7 12.5 - 15.4 %    Platelets 778 485 - 014 k/uL    MPV 10.0 6.0 - 12.0 fL    NRBC Automated NOT REPORTED per 100 WBC Differential Type NOT REPORTED     Seg Neutrophils 72 (H) 36 - 66 %    Lymphocytes 20 (L) 24 - 44 %    Monocytes 8 2 - 11 %    Eosinophils % 0 (L) 1 - 4 %    Basophils 0 0 - 2 %    Immature Granulocytes NOT REPORTED 0 %    Segs Absolute 7.40 1.8 - 7.7 k/uL    Absolute Lymph # 2.10 1.0 - 4.8 k/uL    Absolute Mono # 0.80 0.1 - 1.2 k/uL    Absolute Eos # 0.00 0.0 - 0.4 k/uL    Basophils Absolute 0.00 0.0 - 0.2 k/uL    Absolute Immature Granulocyte NOT REPORTED 0.00 - 0.30 k/uL    WBC Morphology NOT REPORTED     RBC Morphology NOT REPORTED     Platelet Estimate NOT REPORTED    Comprehensive Metabolic Panel w/ Reflex to MG    Collection Time: 06/21/20  2:25 PM   Result Value Ref Range    Glucose 136 (H) 70 - 99 mg/dL    BUN 21 8 - 23 mg/dL    CREATININE 0.93 0.70 - 1.20 mg/dL    Bun/Cre Ratio NOT REPORTED 9 - 20    Calcium 8.9 8.6 - 10.4 mg/dL    Sodium 132 (L) 135 - 144 mmol/L    Potassium 3.9 3.7 - 5.3 mmol/L    Chloride 97 (L) 98 - 107 mmol/L    CO2 20 20 - 31 mmol/L    Anion Gap 15 9 - 17 mmol/L    Alkaline Phosphatase 81 40 - 129 U/L    ALT 19 5 - 41 U/L    AST 32 <40 U/L    Total Bilirubin 0.67 0.3 - 1.2 mg/dL    Total Protein 5.6 (L) 6.4 - 8.3 g/dL    Alb 3.1 (L) 3.5 - 5.2 g/dL    Albumin/Globulin Ratio 1.2 1.0 - 2.5    GFR Non-African American >60 >60 mL/min    GFR African American >60 >60 mL/min    GFR Comment          GFR Staging NOT REPORTED    Troponin    Collection Time: 06/21/20  2:25 PM   Result Value Ref Range    Troponin, High Sensitivity 21 0 - 22 ng/L    Troponin T NOT REPORTED <0.03 ng/mL    Troponin Interp NOT REPORTED    Brain Natriuretic Peptide    Collection Time: 06/21/20  2:25 PM   Result Value Ref Range    Pro-BNP 3,628 (H) <300 pg/mL    BNP Interpretation Pro-BNP Reference Range:    D-Dimer, Quantitative    Collection Time: 06/21/20  2:25 PM   Result Value Ref Range    D-Dimer, Quant 0.60 mg/L FEU   COVID-19    Collection Time: 06/21/20  5:17 PM   Result Value Ref Range    SARS-CoV-2 SARS-CoV-2, Rapid Not Detected Not Detected    Source . NASOPHARYNGEAL SWAB     SARS-CoV-2, PCR         Culture, Blood 1    Collection Time: 06/21/20  6:41 PM   Result Value Ref Range    Specimen Description . BLOOD     Special Requests 20CC LEFT ARM     Culture NO GROWTH 7 HOURS    Lactic Acid, Plasma    Collection Time: 06/21/20  6:41 PM   Result Value Ref Range    Lactic Acid 2.3 (H) 0.5 - 2.2 mmol/L    Lactic Acid, Whole Blood NOT REPORTED 0.7 - 2.1 mmol/L   Culture, Blood 1    Collection Time: 06/21/20  6:48 PM   Result Value Ref Range    Specimen Description . BLOOD     Special Requests [facility] RIGHT HAND     Culture NO GROWTH 7 HOURS    Lactate, Sepsis    Collection Time: 06/22/20  3:00 AM   Result Value Ref Range    Lactic Acid, Sepsis 1.9 0.5 - 1.9 mmol/L    Lactic Acid, Sepsis, Whole Blood NOT REPORTED 0.5 - 1.9 mmol/L   Magnesium    Collection Time: 06/22/20  3:10 AM   Result Value Ref Range    Magnesium 1.3 (L) 1.6 - 2.6 mg/dL   Brain Natriuretic Peptide    Collection Time: 06/22/20  3:10 AM   Result Value Ref Range    Pro-BNP 3,872 (H) <300 pg/mL    BNP Interpretation Pro-BNP Reference Range:    CBC    Collection Time: 06/22/20  3:10 AM   Result Value Ref Range    WBC 8.7 3.5 - 11.0 k/uL    RBC 3.56 (L) 4.5 - 5.9 m/uL    Hemoglobin 13.3 (L) 13.5 - 17.5 g/dL    Hematocrit 38.5 (L) 41 - 53 %    .1 (H) 80 - 100 fL    MCH 37.3 (H) 26 - 34 pg    MCHC 34.5 31 - 37 g/dL    RDW 14.5 12.5 - 15.4 %    Platelets 908 696 - 028 k/uL    MPV 9.5 6.0 - 12.0 fL    NRBC Automated NOT REPORTED per 100 WBC   Basic Metabolic Panel w/ Reflex to MG    Collection Time: 06/22/20  3:10 AM   Result Value Ref Range    Glucose 105 (H) 70 - 99 mg/dL    BUN 18 8 - 23 mg/dL    CREATININE 0.91 0.70 - 1.20 mg/dL    Bun/Cre Ratio NOT REPORTED 9 - 20    Calcium 8.9 8.6 - 10.4 mg/dL    Sodium 136 135 - 144 mmol/L    Potassium 3.6 (L) 3.7 - 5.3 mmol/L    Chloride 98 98 - 107 mmol/L    CO2 23 20 - 31 mmol/L    Anion Gap 15 9 - 17 mmol/L GFR Non-African American >60 >60 mL/min    GFR African American >60 >60 mL/min    GFR Comment          GFR Staging NOT REPORTED        Imaging/Diagnostics:    Xr Chest Portable    Result Date: 6/21/2020  Questionable left lung base opacity. Ct Chest Pulmonary Embolism W Contrast    Result Date: 6/21/2020  1. No evidence of pulmonary embolism. 2. Small layering bilateral pleural effusions with bilateral lower lobe atelectasis and patchy consolidative opacities concerning for bilateral lower lobe pneumonia. Imaging features are atypical or uncommonly reported for viral pneumonia. Alternative diagnoses should be considered. Assessment :      Hospital Problems           Last Modified POA    * (Principal) Pneumonia of both lower lobes due to infectious organism ([facility] [address] 6/21/2020 Yes    Hyperlipidemia 6/21/2020 Yes    Essential hypertension 6/21/2020 Yes    Alcohol abuse 6/22/2020 Yes    PAF (paroxysmal atrial fibrillation) ([facility] [address] 6/21/2020 Yes    Long term current use of antiarrhythmic drug 6/21/2020 Yes    Long term (current) use of anticoagulants 6/21/2020 Yes    Acute CHF (congestive heart failure) ([facility] [address] 6/21/2020 Yes    Hypoxia 6/21/2020 Yes          Plan:     Patient status inpatient in the Progressive Unit/Step down    Pneumonia of both lower lobes due to infectious organism; continue Rocephin and azithromycin IV. Blood cultures x2 show no growth after 7 hours. Initial lactic 2.3 down to 1.9. Procalcitonin pending    Acute CHF; BNP 3628. Lasix 40 mg IV x1. Repeat lasix 40 IV this morning. The patient was recently seen at Dr. Barbara Valenzuela office for new A. fib. According to their note patient had an ablation in 2013 and has a history of PAF. Would like their recommendations for discharge. Consult CHF nurse. BNP every other day. Repeat chest x-ray today.   2D echo today    Paroxysmal A. fib; continue Eliquis 5 mg twice a day, Cardizem, and Toprol XL    Essential hypertension; continue Norvasc 5 mg daily    Hypoxia; respiratory evaluation. Oxygen as needed, continuous pulse ox    Hyperlipidemia; continue Crestor and fenofibrate    Monitor and replace electrolytes as needed    Monitor telemetry and vitals    Cardiac diet    ETOH; Thiamine and folic acid po daily. Encouraged alcohol cessation      Consultations:   IP CONSULT TO HEART FAILURE NURSE/COORDINATOR  IP CONSULT TO DIETITIAN  IP CONSULT TO CARDIOLOGY     Patient is admitted as inpatient status because of co-morbidities listed above, severity of signs and symptoms as outlined, requirement for current medical therapies and most importantly because of direct risk to patient if care not provided in a hospital setting.     ANDERSON Ortiz CNP  6/22/2020  9:26 AM    Copy sent to ANDERSON Mcmahon - CNP

## 2020-06-23 ENCOUNTER — TELEPHONE (OUTPATIENT)
Dept: FAMILY MEDICINE CLINIC | Age: 75
End: 2020-06-23

## 2020-06-23 VITALS
RESPIRATION RATE: 18 BRPM | HEART RATE: 61 BPM | DIASTOLIC BLOOD PRESSURE: 64 MMHG | BODY MASS INDEX: 34.34 KG/M2 | TEMPERATURE: 98.2 F | HEIGHT: 71 IN | OXYGEN SATURATION: 95 % | SYSTOLIC BLOOD PRESSURE: 115 MMHG | WEIGHT: 245.3 LBS

## 2020-06-23 PROBLEM — I50.9 ACUTE CHF (CONGESTIVE HEART FAILURE) (HCC): Status: RESOLVED | Noted: 2020-06-21 | Resolved: 2020-06-23

## 2020-06-23 PROBLEM — D53.9 MACROCYTIC ANEMIA: Status: ACTIVE | Noted: 2020-06-23

## 2020-06-23 PROBLEM — R09.02 HYPOXIA: Status: RESOLVED | Noted: 2020-06-21 | Resolved: 2020-06-23

## 2020-06-23 PROBLEM — F32.A DEPRESSION: Status: ACTIVE | Noted: 2018-01-15

## 2020-06-23 PROBLEM — J18.9 PNEUMONIA OF BOTH LOWER LOBES DUE TO INFECTIOUS ORGANISM: Status: RESOLVED | Noted: 2020-06-21 | Resolved: 2020-06-23

## 2020-06-23 LAB
ANION GAP SERPL CALCULATED.3IONS-SCNC: 12 MMOL/L (ref 9–17)
BNP INTERPRETATION: ABNORMAL
BUN BLDV-MCNC: 19 MG/DL (ref 8–23)
BUN/CREAT BLD: ABNORMAL (ref 9–20)
CALCIUM SERPL-MCNC: 8.5 MG/DL (ref 8.6–10.4)
CHLORIDE BLD-SCNC: 102 MMOL/L (ref 98–107)
CO2: 23 MMOL/L (ref 20–31)
CREAT SERPL-MCNC: 0.96 MG/DL (ref 0.7–1.2)
GFR AFRICAN AMERICAN: >60 ML/MIN
GFR NON-AFRICAN AMERICAN: >60 ML/MIN
GFR SERPL CREATININE-BSD FRML MDRD: ABNORMAL ML/MIN/{1.73_M2}
GFR SERPL CREATININE-BSD FRML MDRD: ABNORMAL ML/MIN/{1.73_M2}
GLUCOSE BLD-MCNC: 107 MG/DL (ref 70–99)
MAGNESIUM: 1.8 MG/DL (ref 1.6–2.6)
POTASSIUM SERPL-SCNC: 4 MMOL/L (ref 3.7–5.3)
PRO-BNP: 2393 PG/ML
SODIUM BLD-SCNC: 137 MMOL/L (ref 135–144)

## 2020-06-23 PROCEDURE — 97110 THERAPEUTIC EXERCISES: CPT

## 2020-06-23 PROCEDURE — 2580000003 HC RX 258: Performed by: NURSE PRACTITIONER

## 2020-06-23 PROCEDURE — 6370000000 HC RX 637 (ALT 250 FOR IP): Performed by: NURSE PRACTITIONER

## 2020-06-23 PROCEDURE — 36415 COLL VENOUS BLD VENIPUNCTURE: CPT

## 2020-06-23 PROCEDURE — 97530 THERAPEUTIC ACTIVITIES: CPT

## 2020-06-23 PROCEDURE — 6360000002 HC RX W HCPCS: Performed by: NURSE PRACTITIONER

## 2020-06-23 PROCEDURE — 2700000000 HC OXYGEN THERAPY PER DAY

## 2020-06-23 PROCEDURE — 6370000000 HC RX 637 (ALT 250 FOR IP): Performed by: INTERNAL MEDICINE

## 2020-06-23 PROCEDURE — 83735 ASSAY OF MAGNESIUM: CPT

## 2020-06-23 PROCEDURE — 97535 SELF CARE MNGMENT TRAINING: CPT

## 2020-06-23 PROCEDURE — 83880 ASSAY OF NATRIURETIC PEPTIDE: CPT

## 2020-06-23 PROCEDURE — 80048 BASIC METABOLIC PNL TOTAL CA: CPT

## 2020-06-23 PROCEDURE — 99232 SBSQ HOSP IP/OBS MODERATE 35: CPT | Performed by: NURSE PRACTITIONER

## 2020-06-23 PROCEDURE — 94761 N-INVAS EAR/PLS OXIMETRY MLT: CPT

## 2020-06-23 PROCEDURE — 97116 GAIT TRAINING THERAPY: CPT

## 2020-06-23 RX ORDER — FUROSEMIDE 20 MG/1
20 TABLET ORAL DAILY
Qty: 30 TABLET | Refills: 0 | Status: SHIPPED | OUTPATIENT
Start: 2020-06-24 | End: 2020-07-09 | Stop reason: SDUPTHER

## 2020-06-23 RX ORDER — FUROSEMIDE 20 MG/1
20 TABLET ORAL DAILY
Status: DISCONTINUED | OUTPATIENT
Start: 2020-06-24 | End: 2020-06-23 | Stop reason: HOSPADM

## 2020-06-23 RX ORDER — HYDROCHLOROTHIAZIDE 25 MG/1
25 TABLET ORAL DAILY
Status: DISCONTINUED | OUTPATIENT
Start: 2020-06-24 | End: 2020-06-23

## 2020-06-23 RX ORDER — LANOLIN ALCOHOL/MO/W.PET/CERES
100 CREAM (GRAM) TOPICAL DAILY
Qty: 30 TABLET | Refills: 0 | Status: SHIPPED | OUTPATIENT
Start: 2020-06-24 | End: 2020-07-09 | Stop reason: SDUPTHER

## 2020-06-23 RX ORDER — FOLIC ACID 1 MG/1
1 TABLET ORAL DAILY
Qty: 30 TABLET | Refills: 3 | Status: SHIPPED | OUTPATIENT
Start: 2020-06-24 | End: 2020-07-09 | Stop reason: SDUPTHER

## 2020-06-23 RX ADMIN — DILTIAZEM HYDROCHLORIDE 240 MG: 120 CAPSULE, COATED, EXTENDED RELEASE ORAL at 08:48

## 2020-06-23 RX ADMIN — APIXABAN 5 MG: 5 TABLET, FILM COATED ORAL at 08:48

## 2020-06-23 RX ADMIN — Medication 10 ML: at 08:49

## 2020-06-23 RX ADMIN — ROSUVASTATIN CALCIUM 20 MG: 20 TABLET, FILM COATED ORAL at 08:48

## 2020-06-23 RX ADMIN — POTASSIUM CHLORIDE 10 MEQ: 1500 TABLET, EXTENDED RELEASE ORAL at 10:31

## 2020-06-23 RX ADMIN — FOLIC ACID 1 MG: 1 TABLET ORAL at 08:48

## 2020-06-23 RX ADMIN — FUROSEMIDE 40 MG: 10 INJECTION, SOLUTION INTRAMUSCULAR; INTRAVENOUS at 08:48

## 2020-06-23 RX ADMIN — Medication 100 MG: at 08:48

## 2020-06-23 RX ADMIN — DRONEDARONE 400 MG: 400 TABLET, FILM COATED ORAL at 08:48

## 2020-06-23 RX ADMIN — ESCITALOPRAM OXALATE 20 MG: 10 TABLET ORAL at 08:48

## 2020-06-23 RX ADMIN — FENOFIBRATE 162 MG: 54 TABLET ORAL at 08:48

## 2020-06-23 RX ADMIN — AMLODIPINE BESYLATE 5 MG: 5 TABLET ORAL at 08:48

## 2020-06-23 ASSESSMENT — PAIN SCALES - GENERAL
PAINLEVEL_OUTOF10: 0
PAINLEVEL_OUTOF10: 0

## 2020-06-23 NOTE — CARE COORDINATION
Patient/family seen: Yes       Informed patient/family of [facility] Medical Bundle Program with potential outreach by either Care Transitions Team or [facility] Team based on hospital admission and location.        [facility] Notification Letter given: Yes         Current discharge plan: home

## 2020-06-23 NOTE — DISCHARGE SUMMARY
[facility]    Discharge Summary     Patient ID: Shy Cox  :  [birth year]   MRN: [id number]     ACCOUNT:  [de-identified]   Patient's PCP: ANDERSON Benítez CNP  Admit Date: 2020   Discharge Date: 2020   Length of Stay: 2  Code Status:  Prior  Admitting Physician: Jia Sanches MD  Discharge Physician: ANDERSON Michaels NP     Active Discharge Diagnoses:     Hospital Problem Lists:  Principal Problem (Resolved):    Acute CHF (congestive heart failure) ([facility]  Active Problems:    Hyperlipidemia    Essential hypertension    Alcohol abuse    PAF (paroxysmal atrial fibrillation) ([facility]    Long term current use of antiarrhythmic drug    Long term (current) use of anticoagulants    Macrocytic anemia  Resolved Problems:    Pneumonia of both lower lobes due to infectious organism [facility])    Hypoxia    Fluid overload      Admission Condition:  fair     Discharged Condition: good    Hospital Stay:     Hospital Course:  Shy Cox is a 76 y.o. male who was admitted for the management of  Acute CHF (congestive heart failure) ([facility] , presented to ER with Shortness of Breath    Patient was initially believed to have bilateral lower lobe pneumonia, but after further investigation it was found patient was fluid overloaded and was asked experiencing acute congestive heart failure from fluid overload. Cardiology was consulted and patient was diuresed with IV Lasix and started on an antiarrhythmic. Chronic anticoagulation for stroke prophylaxis was continued for patient history of paroxysmal atrial fib. He responded well to IV diuresis and was discharged home with plans to follow-up this week with both his PCP and Dr. Blade Bridges. Patient was also encouraged to stop drinking alcohol.       Significant therapeutic interventions:     IV Lasix  Cardiology consultation and evaluation  2D echo  Eliquis for stroke prophylaxis  Multaq started for antiarrhythmic  P.o. Lasix upon discharge    Significant Diagnostic Studies:   Labs / Micro:  CBC:   Lab Results   Component Value Date    WBC 8.7 06/22/2020    RBC 3.56 06/22/2020    HGB 13.3 06/22/2020    HCT 38.5 06/22/2020    .1 06/22/2020    MCH 37.3 06/22/2020    MCHC 34.5 06/22/2020    RDW 14.5 06/22/2020     06/22/2020     BMP:    Lab Results   Component Value Date    GLUCOSE 107 06/23/2020     06/23/2020    K 4.0 06/23/2020     06/23/2020    CO2 23 06/23/2020    ANIONGAP 12 06/23/2020    BUN 19 06/23/2020    CREATININE 0.96 06/23/2020    BUNCRER NOT REPORTED 06/23/2020    CALCIUM 8.5 06/23/2020    LABGLOM >60 06/23/2020    GFRAA >60 06/23/2020    GFR      06/23/2020    GFR NOT REPORTED 06/23/2020     TSH:    Lab Results   Component Value Date    TSH 4.50 06/22/2020        Radiology:  Xr Chest Standard (2 Vw)    Result Date: 6/22/2020  Similar x-ray findings to those seen on CT, as above. Xr Chest Portable    Result Date: 6/21/2020  Questionable left lung base opacity. Ct Chest Pulmonary Embolism W Contrast    Result Date: 6/21/2020  1. No evidence of pulmonary embolism. 2. Small layering bilateral pleural effusions with bilateral lower lobe atelectasis and patchy consolidative opacities concerning for bilateral lower lobe pneumonia. Imaging features are atypical or uncommonly reported for viral pneumonia. Alternative diagnoses should be considered. PneAty       Consultations:    Consults:     Final Specialist Recommendations/Findings:   IP CONSULT TO HEART FAILURE NURSE/COORDINATOR  IP CONSULT TO DIETITIAN  IP CONSULT TO CARDIOLOGY      The patient was seen and examined on day of discharge and this discharge summary is in conjunction with any daily progress note from day of discharge.     Discharge plan:     Disposition: Home    Physician Follow Up:     ANDERSON Quintero - CNP  [address] [place] [address]  [phone]    In 1 week  follow up within 1 tatyana Solano MD  [address] [facility] [address] [phone]    Schedule an appointment as soon as possible for a visit in 3 days  make appointment to follow up in next 3-5 days       Requiring Further Evaluation/Follow Up POST HOSPITALIZATION/Incidental Findings:     Patient will need follow-up on results of vitamin D, vitamin B12, and folate levels. Follow-up on continued alcohol cessation education as well as possible referral to community alcohol cessation program    Diet: cardiac diet    Activity: As tolerated    Instructions to Patient:     Stop consuming alcohol    Take medications as prescribed. Stop hydrochlorothiazide. Start Lasix tomorrow.     Have B12, folate, and vitamin D levels drawn    Follow-up with cardiology in 3 to 5 days post hospital discharge    Follow up with PCP within 1 week post hospital discharge      Discharge Medications:      Medication List      START taking these medications    dronedarone hcl 400 MG Tabs  Commonly known as:  Shawn Nay  Take 1 tablet by mouth 2 times daily (with meals)     folic acid 1 MG tablet  Commonly known as:  FOLVITE  Take 1 tablet by mouth daily     furosemide 20 MG tablet  Commonly known as:  LASIX  Take 1 tablet by mouth daily     thiamine 100 MG tablet  Take 1 tablet by mouth daily        CONTINUE taking these medications    amLODIPine 5 MG tablet  Commonly known as:  NORVASC     apixaban 5 MG Tabs tablet  Commonly known as:  Eliquis  Take 1 tablet by mouth 2 times daily     aspirin EC 81 MG EC tablet     Dialyvite Vitamin D3 Max 1.25 MG (65992 UT) Tabs  Generic drug:  Cholecalciferol     dilTIAZem 240 MG extended release capsule  Commonly known as:  CARDIZEM CD  Take 1 capsule by mouth daily     escitalopram 20 MG tablet  Commonly known as:  LEXAPRO     fenofibrate 160 MG tablet  Commonly known as:  TRIGLIDE  Take 1 tablet by mouth daily     metoprolol succinate 100 MG extended release tablet  Commonly known as:  TOPROL XL rosuvastatin 20 MG tablet  Commonly known as:  CRESTOR  Take 1 tablet by mouth daily     vitamin B-12 500 MCG tablet  Commonly known as:  CYANOCOBALAMIN        STOP taking these medications    hydrochlorothiazide 12.5 MG tablet  Commonly known as:  HYDRODIURIL           Where to Get Your Medications      These medications were sent to  [facility], [facility] [address]  [address] [address]    Phone:  [phone]   · dronedarone hcl 183 MG Tabs  · folic acid 1 MG tablet  · furosemide 20 MG tablet  · thiamine 100 MG tablet         Discharge Procedure Orders   Vitamin B12 & Folate   Standing Status: Future Standing Exp. Date: 09/23/20   Order Comments: Please send result to BOOGIE RICHARDS (pt's PCP)     Vitamin D 25 Hydroxy   Standing Status: Future Standing Exp. Date: 09/23/20   Order Comments: Please send result to patient's PCP BOOGIE RICHARDS       Time Spent on discharge is  27 mins in patient examination, evaluation, counseling as well as medication reconciliation, prescriptions for required medications, discharge plan and follow up. Electronically signed by   ANDERSON Wynne NP  7/7/2020  7:58 AM      Thank you ANDERSON Saunders CNP for the opportunity to be involved in this patient's care.

## 2020-06-23 NOTE — PROGRESS NOTES
Physical Therapy  Facility/Department: [facility] ICU  Daily Treatment Note  NAME: Josh Perez  : 1945  MRN: [id number]    Date of Service: 2020    Discharge Recommendations:  Continue to assess pending progress(Likely no further therapy required at discharge)   PT Equipment Recommendations  Equipment Needed: No  Other: Pt already owns a single point cane- writer recommends pt use device at this time    Assessment   Body structures, Functions, Activity limitations: Decreased functional mobility ; Decreased strength;Decreased endurance;Decreased balance  Assessment: Pt with decreased endurance and overall mobility. Pt able to ambulate a functional distance with use of single point cane. Pt already owns a cane and writer recommends use of cane at discharge. Will continue to assess overall discharge needs but likely will not require any further therapy at discharge. Pt was provided with written HEP to continue to improve his LE strength. Prognosis: Good  PT Education: Functional Mobility Training;Transfer Training;Gait Training;Home Exercise Program  Patient Education: Provided with written HEP for standing exercises  REQUIRES PT FOLLOW UP: Yes  Activity Tolerance  Activity Tolerance: Patient limited by endurance     Patient Diagnosis(es): The encounter diagnosis was Pneumonia due to organism. has a past medical history of A-fib ([address] Anxiety, Arrhythmia, Depression, Hyperlipidemia, Hypertension, and Obesity. has a past surgical history that includes joint replacement (Left); Tonsillectomy; and Atrial ablation surgery (). Restrictions  Restrictions/Precautions  Restrictions/Precautions: Fall Risk  Required Braces or Orthoses?: No  Position Activity Restriction  Other position/activity restrictions: Up with assistance   Subjective   General  Response To Previous Treatment: Patient with no complaints from previous session.   Family / Caregiver Present: No  Subjective  Subjective: Pt supine in bed and agreeable to therapy this morning. RN agreeable to therapy. Pt denies any pain at this time. Pain Screening  Patient Currently in Pain: Denies  Vital Signs  Patient Currently in Pain: Denies       Orientation  Orientation  Overall Orientation Status: Within Functional Limits  Cognition   Cognition  Overall Cognitive Status: WFL  Objective   Bed mobility  Supine to Sit: Stand by assistance  Sit to Supine: (Pt retired to chair at end of session)  Scooting: Stand by assistance  Transfers  Sit to Stand: Stand by assistance  Stand to sit: Stand by assistance  Ambulation  Ambulation?: Yes  Ambulation 1  Surface: level tile  Device: Single point cane  Other Apparatus: (room air)  Assistance: Stand by assistance  Quality of Gait: improving unsteadiness compared to yesterday, decreased step length, normalized gait speed today, decreased endurance but able to maintain oxygen saturation on room air  Gait Deviations: Decreased step length; Increased HELDER  Distance: 60ft, 120ft  Comments: Pt's oxygen saturation remained between 91-96% on room air during all mobility. Stairs/Curb  Stairs?: Yes  Stairs  # Steps : 10  Stairs Height: (Combination of 2 and 4 inch steps)  Rails: Right ascending  Device: No Device  Assistance: Contact guard assistance     Balance  Posture: Good  Sitting - Static: Good  Sitting - Dynamic: Good;-  Standing - Static: Fair;+  Standing - Dynamic: Fair;+  Comments: standing balance assessed with SPC          Exercises:    Standing exercise program: Heel/toe raises, hip flexion, hip abduction, mini squats, hip extension, and hamstring curls.  Reps: 10 with bilateral UE support              Goals  Short term goals  Time Frame for Short term goals: 14 visits  Short term goal 1: Pt to ambulate 300ft modified independently with least restrictive device to allow for return to prior functional level  Short term goal 2: Pt to sit <> stand transfer independently to allow for return to prior functional level  Short term goal 3: Pt to demonstrate independent bed mobility to allow for return to prior functional level  Short term goal 4: Pt to ascend/descend a flight of stairs with one rail modified independently to allow for access to bed/bath level of home  Short term goal 5: Pt to tolerate 30 minutes of therapy for endurance  Patient Goals   Patient goals : Pt would like to feel better overall    Plan    Plan  Times per week: 5-6x  Times per day: Daily  Current Treatment Recommendations: Strengthening, Functional Mobility Training, Transfer Training, Balance Training, Endurance Training, Gait Training, Stair training, Home Exercise Program, Safety Education & Training, Patient/Caregiver Education & Training  Safety Devices  Type of devices: Call light within reach, Gait belt, Left in chair, Nurse notified  Restraints  Initially in place: No     Therapy Time   Individual Concurrent Group Co-treatment   Time In 0842         Time Out 0921         Minutes 39         Timed Code Treatment Minutes: 1451 Candido Rizvi, PT

## 2020-06-23 NOTE — PROGRESS NOTES
Occupational Therapy  Facility/Department: [facility] ICU  Daily Treatment Note  NAME: Tre Glaser  : 1945  MRN: [id number]    Date of Service: 2020    Discharge Recommendations:  Continue to assess pending progress       Assessment   Performance deficits / Impairments: Decreased functional mobility ; Decreased ADL status; Decreased high-level IADLs;Decreased endurance  Prognosis: Good  Decision Making: Medium Complexity  OT Education: OT Role;Plan of Care;ADL Adaptive Strategies; Equipment; Energy Conservation  Patient Education: Pt educated on use of AE and compensatory strategies to incorporate throughout LB ADL tasks, pt verbalized understanding. REQUIRES OT FOLLOW UP: Yes  Activity Tolerance  Activity Tolerance: Patient Tolerated treatment well  Safety Devices  Safety Devices in place: Yes  Type of devices: Call light within reach;Nurse notified; Left in chair  Restraints  Initially in place: No         Patient Diagnosis(es): The primary encounter diagnosis was Acute congestive heart failure, unspecified heart failure type ([facility] Diagnoses of Pneumonia due to organism and Macrocytic anemia were also pertinent to this visit. has a past medical history of A-fib ([facility] Anxiety, Arrhythmia, Depression, Hyperlipidemia, Hypertension, and Obesity. has a past surgical history that includes joint replacement (Left); Tonsillectomy; and Atrial ablation surgery (2013). Restrictions  Restrictions/Precautions  Restrictions/Precautions: Fall Risk  Required Braces or Orthoses?: No  Position Activity Restriction  Other position/activity restrictions: Up with assistance      Subjective   General  Chart Reviewed: Orders, Progress Notes, History and Physical, Labs, Imaging  Patient assessed for rehabilitation services?: Yes  Family / Caregiver Present: No  General Comment  Comments: RN ok'd for therapy this AM. Pt agreeable to participate in session and pleasant/cooperative throughout.   Vital Signs  Patient Currently in Pain: Denies     Orientation  Orientation  Overall Orientation Status: Within Functional Limits     Objective    ADL  LE Dressing: Increased time to complete;Stand by assistance(seated on couch to doff/don socks and shoes, pt educated in use of various surface heights, ankle over knee technique and use of sock aid with good return)           Balance  Sitting Balance: Supervision  Standing Balance: Stand by assistance  Standing Balance  Time: ~2 minutes  Activity: functional mobility within hospital room  Comment: SBA with use of cane. Pt steady with no LOB. Bed mobility  Comment: Pt up to chair at therapist arrival and retired up to chair at therapist exit. Transfers  Sit to stand: Supervision  Stand to sit: Supervision         Cognition  Overall Cognitive Status: [facility]         Plan   Plan  Times per week: 5-6x/week  Current Treatment Recommendations: Endurance Training, Patient/Caregiver Education & Training, Equipment Evaluation, Education, & procurement, Self-Care / ADL, Functional Mobility Training, Safety Education & Training      Goals  Short term goals  Time Frame for Short term goals: 14 days  Short term goal 1: Pt will complete ADL activities with MOD IND, use of AD/AE PRN. Short term goal 2: Pt will complete functional mobility/transfers with MOD IND, use of AD PRN. Short term goal 3: Pt will demonstrate ~20 minutes of dynamic standing toelerance during ADL/functional mobility tasks. Short term goal 4: Pt will demonstrate good energy conservation techniques during all ADL tasks.         Therapy Time   Individual Concurrent Group Co-treatment   Time In 2010         Time Out 1102         Minutes 9         Timed Code Treatment Minutes: 9 Minutes       Prashanth Gregory OTR/L

## 2020-06-23 NOTE — CARE COORDINATION
Discharge [facility] Case Management Department  Written by: Della Donald RN, BSN    Patient Name: Shani Sullivan  Attending Provider: No att. providers found  Admit Date: 2020  2:02 PM  MRN: [id number]  Account: [de-identified]                     : 1945  Discharge Date: 2020      Disposition: home    IMM obtained, red zone sheet reviewed.      Della Donald RN, BSN

## 2020-06-23 NOTE — PROGRESS NOTES
Patient discharged to home in stable condition. IV removed intact and without difficulty. All belongings and discharge instructions sent with patient. All questions and concerns answered. Patient wanted to wait on the bench out front for daughter.

## 2020-06-23 NOTE — PROGRESS NOTES
[facility]    Progress Note    6/23/2020    2:42 PM    Name:   Boy Fernandez  MRN:     [id number]     Acct:      [de-identified]   Room:   [address] [place] [address] Day:  2  Admit Date:  6/21/2020  2:02 PM    PCP:   ANDERSON Menard CNP  Code Status:  Prior    Subjective:     C/C: Feeling better, would like to go home. Chief Complaint   Patient presents with    Shortness of Breath     Interval History Status: improved. Patient awake, alert, sitting up in chair. He reports his breathing is much better, and his legs are less swollen. He denies chest pain, headache, dizziness, shortness of breath, abdominal pain, nausea, vomiting. He says that he would like to have his hydrochlorothiazide stopped and would prefer to take Lasix for his diuretic. He is able to use his incentive spirometer well. He denies fever. Echo was completed and revealed LVEF 65% with normal RV size and function. No obvious valvular abnormalities or significant pericardial effusion noted. Patient was on 3 L O2 overnight, but this has since been weaned off and SPO2 is within normal limits. Yesterday cardiology was consulted and evaluated patient. Brief History:     Patient is a 79-year-old  male who presented to the ED with shortness of breath and was admitted for the management of what was initially thought to be bilateral pneumonia, but then was determined acute congestive heart failure due to volume overload. He saw his PCP last Tuesday for complaints of general fatigue and patient was found to be in atrial fibrillation at that time, his PCP referred him to cardiology who started him on Eliquis and stopped his previously ordered sotalol. By Friday patient was feeling short of breath with minimal activity.   The shortness of breath would improve with rest.  His significant medical history includes atrial fibrillation, daily alcohol use, hypertension, Allergies:  No Known Allergies    Current Meds:   Scheduled Meds:     Continuous Infusions:   PRN Meds:     Data:     Past Medical History:   has a past medical history of A-fib ([address] Anxiety, Arrhythmia, Depression, Hyperlipidemia, Hypertension, and Obesity. Social History:   reports that he has never smoked. He has never used smokeless tobacco. He reports current alcohol use of about 4.0 standard drinks of alcohol per week. He reports that he does not use drugs. Family History:   Family History   Problem Relation Age of Onset    Stroke Mother     Thyroid Disease Mother     No Known Problems Father        Vitals:  /64   Pulse 61   Temp 98.2 °F (36.8 °C) (Oral)   Resp 18   Ht 5' 11\" (1.803 m)   Wt 245 lb 4.8 oz (111.3 kg)   SpO2 95%   BMI 34.21 kg/m²   Temp (24hrs), Av.2 °F (36.8 °C), Min:98.1 °F (36.7 °C), Max:98.2 °F (36.8 °C)        I/O (24Hr):     Intake/Output Summary (Last 24 hours) at 2020 1442  Last data filed at 2020 1033  Gross per 24 hour   Intake 300 ml   Output 1275 ml   Net -975 ml       Labs:  Hematology:  Recent Labs     20  1425 20  0310   WBC 10.3 8.7   RBC 3.65* 3.56*   HGB 13.6 13.3*   HCT 39.8* 38.5*   .9* 108.1*   MCH 37.2* 37.3*   MCHC 34.1 34.5   RDW 14.7 14.5    202   MPV 10.0 9.5   DDIMER 0.60  --      Chemistry:  Recent Labs     20  1425 20  1841 20  0310 20  1308 20  0626   *  --  136  --  137   K 3.9  --  3.6* 4.3 4.0   CL 97*  --  98  --  102   CO2 20  --  23  --  23   GLUCOSE 136*  --  105*  --  107*   BUN 21  --  18  --  19   CREATININE 0.93  --  0.91  --  0.96   MG  --   --  1.3* 2.1 1.8   ANIONGAP 15  --  15  --  12   LABGLOM >60  --  >60  --  >60   GFRAA >60  --  >60  --  >60   CALCIUM 8.9  --  8.9  --  8.5*   PROBNP 3,628*  --  3,872*  --  2,393*   TROPHS   --   --   --   --    LACTACIDWB  --  NOT REPORTED  --   --   --      Recent Labs     20  1425 20  0310   PROT 5.6*  --    LABALBU 3.1*  --    TSH  --  4.50   AST 32  --    ALT 19  --    ALKPHOS 81  --    BILITOT 0.67  --    CHOL  --  114   HDL  --  39*   LDLCHOLESTEROL  --  53   CHOLHDLRATIO  --  2.9   TRIG  --  110   VLDL  --  NOT REPORTED     ABG:No results found for: POCPH, PHART, PH, POCPCO2, BZH2LUE, PCO2, POCPO2, PO2ART, PO2, POCHCO3, RLJ6WHX, HCO3, NBEA, PBEA, BEART, BE, THGBART, THB, ZPM0CTH, DGTV7LLR, M5VRTOUI, O2SAT, FIO2  Lab Results   Component Value Date/Time    [facility] RIGHT HAND 06/21/2020 06:48 PM     Lab Results   Component Value Date/Time    CULTURE NO GROWTH 2 DAYS 06/21/2020 06:48 PM       Radiology:  Dago Duong Chest Standard (2 Vw)    Result Date: 6/22/2020  Similar x-ray findings to those seen on CT, as above. Xr Chest Portable    Result Date: 6/21/2020  Questionable left lung base opacity. Ct Chest Pulmonary Embolism W Contrast    Result Date: 6/21/2020  1. No evidence of pulmonary embolism. 2. Small layering bilateral pleural effusions with bilateral lower lobe atelectasis and patchy consolidative opacities concerning for bilateral lower lobe pneumonia. Imaging features are atypical or uncommonly reported for viral pneumonia. Alternative diagnoses should be considered.  PneAty       Physical Examination:       General appearance:  alert, cooperative and no distress  Mental Status:  oriented to person, place and time and normal affect  Lungs:  clear to auscultation bilaterally, normal effort, on room air  Heart:  regular rate and rhythm, no murmur  Abdomen:  soft, nontender, nondistended, normal bowel sounds, no masses, hepatomegaly, splenomegaly  Extremities: Trace pitting edema bilateral lower extremities, no redness, tenderness in the calves  Skin:  no gross lesions, rashes, induration    Assessment:     Hospital Problems           Last Modified POA    Hyperlipidemia 6/23/2020 Yes    Essential hypertension 6/23/2020 Yes    Alcohol abuse 6/23/2020 Yes    PA[facility]oxysmal [facility] fibrillation) [facility] 6/23/2020 Yes    Long term current use of antiarrhythmic drug 6/23/2020 Yes    Long term (current) use of anticoagulants 6/23/2020 Yes    Macrocytic anemia 6/23/2020 Yes          Plan:     1. Acute CHF suspected to be related to fluid overload. Echo completed and revealed normal EF and normal RVSP. 2. Change Lasix to 20 mg daily upon discharge per cardiology recommendations  3. Follow-up with cardiology in 3 to 5 days post discharge  4. Continue Norvasc. Monitor blood pressure every 4 hours. 5. Continue Eliquis for chronic anticoagulation for stroke prophylaxis for A. Fib  6. Alcohol cessation education. Continue folic acid and thiamine. 7. Continue statin and fenofibrate. 8. Continuous pulse ox. 9. Telemetry  10. GI prophylaxis  11. Alcohol cessation education. Check vitamin D, folate, and B12 levels as outpatient. Plan for discharge home today with follow-up with PCP within 1 week as well as follow-up with Dr. Selvin Arrington office in 3 to 5 days post hospital discharge.     Devon Arce, ANDERSON - NP  6/23/2020  2:42 PM

## 2020-06-24 NOTE — TELEPHONE ENCOUNTER
Makenna 45 Transitions Initial Follow Up Call    Outreach made within 2 business days of discharge: Yes    Patient: Gualberto Langston Patient : 1945   MRN: [id number]  Reason for Admission: There are no discharge diagnoses documented for the most recent discharge. Discharge Date: 20       Spoke with: Janki Bender    Discharge department/facility: [facility] Patient Contact:  Was patient able to fill all prescriptions: Yes  Was patient instructed to bring all medications to the follow-up visit: Yes  Is patient taking all medications as directed in the discharge summary?  Yes  Does patient understand their discharge instructions: Yes  Does patient have questions or concerns that need addressed prior to 7-14 day follow up office visit: no    Scheduled appointment with PCP within 7-14 days    Follow Up  Future Appointments   Date Time Provider Waqas Mcknight   2020  3:15 PM Durga Chery MD [address] [facility] Heart a   2020  8:00 AM SCHEDULE, [facility] [place] [facility] [address] a   2020 11:45 AM SCHEDULE, [facility] [place] [facility] [place] [facility] [address] a   2020  9:15 AM ANDERSON Huynh - CNP [facility] [address] [facility] Heart a       Valeria Nuno LPN

## 2020-06-24 NOTE — CARE COORDINATION
Latasha Cruz is no longer included within the BPCI-A Medical Bundle due to a non-qualifying DRG (291), changed from DRG (195), for the admitting location [facility]. 6/24/2020, Tona Ham

## 2020-06-27 LAB
CULTURE: NORMAL
CULTURE: NORMAL
Lab: NORMAL
Lab: NORMAL
SPECIMEN DESCRIPTION: NORMAL
SPECIMEN DESCRIPTION: NORMAL

## 2020-07-07 PROBLEM — E87.70 FLUID OVERLOAD: Status: RESOLVED | Noted: 2020-07-07 | Resolved: 2020-07-07

## 2020-07-07 PROBLEM — E87.70 FLUID OVERLOAD: Status: ACTIVE | Noted: 2020-07-07

## 2020-08-10 NOTE — TELEPHONE ENCOUNTER
FYI:  I do have a major issue as I have NOT gotten my Multaq and out for over two weeks.  Went to [facility] per script today from heart doctor - the cost was $500 my share and also the blood thinner is also $500.  I thought all these were being approved by [facility] and got everything but those two scripts. Janki Bailey do I now need blood thinner as everything has been approved and heart back in timing.  Please give me an appointment to get my overdue vaccinations.  Blood pressure today 112 over 74 and all else looking good.  \"Pissing like a cow every hour due to new water pill\" .

## 2020-08-10 NOTE — TELEPHONE ENCOUNTER
We need to clarify exactly what medications cardiology is going to continue patient on he has not seen me since prior to his cardioversion of which I diagnosed him with the A. fib and they took over from that point patient was seen in the hospital medications were started [facility] was ordered on 7 August and I need to confirm if that was to continue if he is having issues with the cost of medication for both Multitak and Eliquis. So it is a point at this point patient will not be compliant if he cannot get the medications for a lower cost and is or something else that we can put in a month and if so we need to have cardiology involved as they are the ones who started this medication.       Sent and message off to [employer] via [employer] and to see if we can get some clarification in regards to how patient will be proceeding with medications through pharmacy as advised by cardiology

## 2020-08-11 NOTE — TELEPHONE ENCOUNTER
----- Message from ANDERSON Carrasco CNP sent at 8/11/2020  8:38 AM EDT -----  Ignacio Tuan. He was cardioverted while on Multaq. If he is not taking it, he needs a few things. One, he needs to make sure he stays on his NOAC to avoid possibility of stroke. For right now, please send him to our office to get samples of NOAC. Two, he will need another Holter to assess his AF burden since he is not on antiarrhythmics. Three, he can call our office and talk with Malena about obtaining financial assistance (if he qualifies). Four, if he wants, he can go back to EP and see about possibility of another ablation. We can trial Rythmol on him if his insurance is cheaper. There are plenty of options for him and what he wants to do. I have also put my  on this thread Ty Lux). I will reach out to her and then she can reach out to patient to see if he wants an office visit with us to discuss these manners. Please let me know if there is anything else you have questions or concerns on regarding Mr. Warren Merrill. Thank you very much. SUSIE Carrasco, MSN  ----- Message -----  From: ANDERSON Ellison CNP  Sent: 8/10/2020   5:42 PM EDT  To: ANDERSON Carrasco CNP    Good afternoon Sayra Au,    I received a call from my friend Shawanda  today in regards to medication Multitak and it looks like the cost of his Eliquis. Patient reports that he did not receive the Multitak and went to pharmacy and it was going to cost him over $500. Patient has not been compliant with this medication as a personal friends of the family, and wondering if there is something else that we can do for him. He is also questioning why he needs to remain on Eliquis understandably he was just recently diagnosed with new onset A. fib underwent his cardioversion and is only recently been stabilized.   We are unsure how long that is going to last so therefore we may need to look at other avenues in regards to blood thinners if this is going to be a continuous issue because patient will not pay $500 for Eliquis. Patient me back a message is fine if you want to contact me at the office the number here is [phone] I am here tomorrow from 9 to 6 PM and would like to try to get this squared away for patient so that one we do not have him noncompliant, and to making sure that family is aware of how patient is going to be proceeding with his care.     Any assistance and help would be fantastic I do appreciate this    Sincerely,    Rona BARRON-CNP

## 2020-08-16 ENCOUNTER — PATIENT MESSAGE (OUTPATIENT)
Dept: FAMILY MEDICINE CLINIC | Age: 75
End: 2020-08-16

## 2020-08-31 ENCOUNTER — OFFICE VISIT (OUTPATIENT)
Dept: FAMILY MEDICINE CLINIC | Age: 75
End: 2020-08-31
Payer: MEDICARE

## 2020-08-31 VITALS
BODY MASS INDEX: 35.14 KG/M2 | DIASTOLIC BLOOD PRESSURE: 84 MMHG | HEIGHT: 71 IN | OXYGEN SATURATION: 98 % | WEIGHT: 251 LBS | SYSTOLIC BLOOD PRESSURE: 132 MMHG | HEART RATE: 65 BPM | TEMPERATURE: 97.5 F

## 2020-08-31 PROCEDURE — G8417 CALC BMI ABV UP PARAM F/U: HCPCS | Performed by: NURSE PRACTITIONER

## 2020-08-31 PROCEDURE — G8427 DOCREV CUR MEDS BY ELIG CLIN: HCPCS | Performed by: NURSE PRACTITIONER

## 2020-08-31 PROCEDURE — 3017F COLORECTAL CA SCREEN DOC REV: CPT | Performed by: NURSE PRACTITIONER

## 2020-08-31 PROCEDURE — 99214 OFFICE O/P EST MOD 30 MIN: CPT | Performed by: NURSE PRACTITIONER

## 2020-08-31 PROCEDURE — 1036F TOBACCO NON-USER: CPT | Performed by: NURSE PRACTITIONER

## 2020-08-31 PROCEDURE — 1123F ACP DISCUSS/DSCN MKR DOCD: CPT | Performed by: NURSE PRACTITIONER

## 2020-08-31 PROCEDURE — 4040F PNEUMOC VAC/ADMIN/RCVD: CPT | Performed by: NURSE PRACTITIONER

## 2020-08-31 RX ORDER — ESCITALOPRAM OXALATE 20 MG/1
20 TABLET ORAL DAILY
Qty: 90 TABLET | Refills: 3 | Status: SHIPPED | OUTPATIENT
Start: 2020-08-31 | End: 2020-11-11 | Stop reason: SDUPTHER

## 2020-08-31 RX ORDER — METOPROLOL SUCCINATE 100 MG/1
100 TABLET, EXTENDED RELEASE ORAL NIGHTLY
Qty: 90 TABLET | Refills: 3 | Status: SHIPPED | OUTPATIENT
Start: 2020-08-31 | End: 2021-05-11

## 2020-08-31 RX ORDER — AMLODIPINE BESYLATE 5 MG/1
5 TABLET ORAL DAILY
Qty: 90 TABLET | Refills: 3 | Status: SHIPPED | OUTPATIENT
Start: 2020-08-31 | End: 2021-05-11

## 2020-08-31 RX ORDER — ROSUVASTATIN CALCIUM 20 MG/1
20 TABLET, COATED ORAL DAILY
Qty: 90 TABLET | Refills: 3 | Status: SHIPPED | OUTPATIENT
Start: 2020-08-31 | End: 2021-05-11

## 2020-08-31 RX ORDER — DILTIAZEM HYDROCHLORIDE 240 MG/1
240 CAPSULE, COATED, EXTENDED RELEASE ORAL DAILY
Qty: 90 CAPSULE | Refills: 3 | Status: SHIPPED | OUTPATIENT
Start: 2020-08-31 | End: 2021-05-11

## 2020-08-31 ASSESSMENT — ENCOUNTER SYMPTOMS
CONSTIPATION: 0
DIARRHEA: 0
SHORTNESS OF BREATH: 0
RHINORRHEA: 0
SORE THROAT: 0
COUGH: 0

## 2020-08-31 NOTE — PATIENT INSTRUCTIONS
It was my pleasure to meet with you today. Please contact me with any questions or concerns, and please notify myself or our manger if there is anyway we can improve our service in your health care needs.  Below I have listed some instructions and information that pertain to today's visit.    -You have been advised to continue all current medication, otherwise not discussed in today's visit  -New medications and refills will been sent and made available at pharmacy or mail away  -Complete  fasting (8-12 hours - water, black coffee, plain tea ok) labs and/any other testing ordered prior to next scheduled followup  -Continue daily exercise with in your physical capacity  -You have been  instructed to bring in Blood pressure log(s) to next appointment to review with your provider

## 2020-08-31 NOTE — PROGRESS NOTES
furosemide (LASIX) 20 MG tablet Take 1 tablet by mouth daily 30 tablet 11    folic acid (FOLVITE) 1 MG tablet Take 1 tablet by mouth daily 30 tablet 11    vitamin B-12 (CYANOCOBALAMIN) 500 MCG tablet Take 500 mcg by mouth daily      aspirin EC 81 MG EC tablet Take 81 mg by mouth daily      apixaban (ELIQUIS) 5 MG TABS tablet Take 1 tablet by mouth 2 times daily 90 tablet 3    Cholecalciferol (DIALYVITE VITAMIN D3 MAX) 1.25 MG (07329 UT) TABS Take 50,000 Units by mouth daily      fenofibrate (TRIGLIDE) 160 MG tablet Take 1 tablet by mouth daily 90 tablet 1    dapagliflozin (FARXIGA) 10 MG tablet Take 1 tablet by mouth every morning (Patient not taking: Reported on 8/31/2020) 90 tablet 3     No current facility-administered medications for this visit. Past Medical History:   Diagnosis Date    A-fib ([facility] [address]     Anxiety     Arrhythmia     Depression     Hyperlipidemia     Hypertension     Obesity       Past Surgical History:   Procedure Laterality Date    ATRIAL ABLATION SURGERY  2013    JOINT REPLACEMENT Left     TONSILLECTOMY       Family History   Problem Relation Age of Onset    Stroke Mother     Thyroid Disease Mother     No Known Problems Father      Social History     Tobacco Use    Smoking status: Never Smoker    Smokeless tobacco: Never Used   Substance Use Topics    Alcohol use: Yes     Alcohol/week: 4.0 standard drinks     Types: 4 Cans of beer per week     Frequency: Monthly or less     Drinks per session: 3 or 4     Binge frequency: Never     Comment: 3 or 4 beers daily        Patient Care Team:    REVIEW OF SYSTEMS:     Review of Systems   Constitutional: Positive for activity change and fatigue. Negative for unexpected weight change. HENT: Negative for congestion, ear pain, hearing loss, rhinorrhea and sore throat. Respiratory: Negative for cough and shortness of breath. Cardiovascular: Negative for chest pain, palpitations and leg swelling.    Gastrointestinal: Negative for constipation and diarrhea. Musculoskeletal: Negative for arthralgias and gait problem. Neurological: Negative for dizziness, weakness and headaches. Psychiatric/Behavioral: Negative for confusion. The patient is not nervous/anxious. HISTORY OF PRESENT ILLNESS     Patient is a 68-year-old male returns today for follow-up from previous appointment. Patient was diagnosed with atrial fib on most recent new patient appointment by EKG. Patient was immediately seen by cardiology within the        first week. Noted that time they changed him to Eliquis 5 mg twice daily and started him on Multitak. Patient has been continued on his Cardizem metoprolol amlodipine and started on Lasix. Shortly shortly after patient was reviewed and assessed by cardiology. Patient started experiencing shortness of breath with increased lower leg extremity swelling. Spoke with patient on-call service for which he was is described as having increased fatigue and shortness of breath he was referred to [facility].  At which point he was diagnosed with congestive acute onset congestive heart failure diuresed and treated for lower lobe pneumonia. Patient was eventually cardioverted and has maintained normal rhythm since that time he is continued on his Multitak for which she has had significant difficulties obtaining due to cost.  Currently he has a delivery noted tomorrow. Patient reports that he is only been taking his multi 1 time daily despite the dual dosage daily. He has continued to take his Eliquis with no issue. Patient also continues to take his lipid medication with no issues and is continued to take his Eliquis. Today patient denies any issues any fatigue. We did discuss about his activity which has been lessened with increased fatigue.   However family who is present today is had discussions with trying to keep him as active as possible and he has since returned to minimal work with his previous employer. Patient also continues to drink despite severe warning, approximation 4-5 drinks per night. We will continue to monitor as patient is continued with this habit for several years. PHYSICAL EXAM:     /84 (Site: Right Upper Arm, Position: Sitting, Cuff Size: Large Adult)   Pulse 65   Temp 97.5 °F (36.4 °C)   Ht 5' 11\" (1.803 m)   Wt 251 lb (113.9 kg)   SpO2 98%   BMI 35.01 kg/m²    Physical Exam  Vitals signs reviewed. Constitutional:       Appearance: Normal appearance. He is not ill-appearing. HENT:      Head: Normocephalic and atraumatic. Eyes:      Extraocular Movements: Extraocular movements intact. Pupils: Pupils are equal, round, and reactive to light. Neck:      Musculoskeletal: Normal range of motion and neck supple. Vascular: No carotid bruit. Cardiovascular:      Rate and Rhythm: Normal rate. Rhythm regularly irregular. Pulses: Normal pulses. Heart sounds: Murmur present. No friction rub. No gallop. Pulmonary:      Effort: Pulmonary effort is normal. No respiratory distress. Breath sounds: Normal breath sounds. No wheezing. Abdominal:      General: Abdomen is protuberant. Bowel sounds are normal. There is no distension. Palpations: Abdomen is soft. There is no mass. Tenderness: There is no abdominal tenderness. There is no right CVA tenderness or left CVA tenderness. Musculoskeletal: Normal range of motion. General: No swelling or tenderness. Right lower leg: Edema present. Left lower leg: Edema present. Skin:     General: Skin is warm. Capillary Refill: Capillary refill takes less than 2 seconds. Findings: No erythema, lesion or rash. Neurological:      General: No focal deficit present. Mental Status: He is alert and oriented to person, place, and time. Psychiatric:         Mood and Affect: Mood normal.         Behavior: Behavior normal. Behavior is cooperative.           ASSESSMENT Anthony Meng     1. Hypertension, unspecified type  -continue with current medication as prescribed  -complete labs prior to next appointment  -healthy diet as discussed   -daily exercise with in physical limitations  -drink 6-8 glasses of water daily  - return for follow-up visit as discussed and scheduled  - Comprehensive Metabolic Panel, Fasting; Future  - TSH without Reflex; Future  - CBC; Future  - dilTIAZem (CARDIZEM CD) 240 MG extended release capsule; Take 1 capsule by mouth daily  Dispense: 90 capsule; Refill: 3    2. Longstanding persistent atrial fibrillation    - Comprehensive Metabolic Panel, Fasting; Future  - TSH without Reflex; Future  - CBC; Future    3. Long term (current) use of anticoagulants      4. Hypomagnesemia    - Magnesium; Future    5. Dysthymia    - metoprolol succinate (TOPROL XL) 100 MG extended release tablet; Take 1 tablet by mouth nightly  Dispense: 90 tablet; Refill: 3  - escitalopram (LEXAPRO) 20 MG tablet; Take 1 tablet by mouth daily  Dispense: 90 tablet; Refill: 3  - amLODIPine (NORVASC) 5 MG tablet; Take 1 tablet by mouth daily  Dispense: 90 tablet; Refill: 3    6. Hyperlipidemia, unspecified hyperlipidemia type    - rosuvastatin (CRESTOR) 20 MG tablet; Take 1 tablet by mouth daily  Dispense: 90 tablet; Refill: 3      Return in about 5 months (around 1/31/2021) for Medicare Annual Wellness Visit  and lab followup. COMMUNICATION:     Reports Elkin was resulted he has received a phone call and notification that the testing was negative. We have still not received evidence in regards to this we will need to place a call off to exact laboratories so that we can get results of this test faxed for our documentation. The best way to find yourself is to lose yourself in the service of others - [email or web address] [facility]   [email or web address]  Office: [phone]   Cell: [phone]    Electronically signed by Kenny Holliday ANDERSON Putnam-CNP on 8/31/2020 at 7:10 PM

## 2020-11-10 NOTE — TELEPHONE ENCOUNTER
LOV 8/31/20  LRF 8/31/20  RTO 5 months    Health Maintenance   Topic Date Due    Colon cancer screen colonoscopy  02/10/1995    Pneumococcal 65+ years Vaccine (1 of 1 - PPSV23) 02/10/2010    Flu vaccine (1) 09/01/2020    Annual Wellness Visit (AWV)  01/21/2021 (Originally 6/23/2020)    DTaP/Tdap/Td vaccine (1 - Tdap) 08/31/2021 (Originally [birth year]10/1964)    Shingles Vaccine (1 of 2) 08/31/2021 (Originally 2/10/1995)    Lipid screen  06/22/2021    Potassium monitoring  06/23/2021    Creatinine monitoring  06/23/2021    Hepatitis A vaccine  Aged Out    Hepatitis B vaccine  Aged Out    Hib vaccine  Aged Out    Meningococcal (ACWY) vaccine  Aged Out    Hepatitis C screen  Discontinued             (applicable per patient's age: Cancer Screenings, Depression Screening, Fall Risk Screening, Immunizations)    LDL Cholesterol (mg/dL)   Date Value   06/22/2020 53     AST (U/L)   Date Value   06/21/2020 32     ALT (U/L)   Date Value   06/21/2020 19     BUN (mg/dL)   Date Value   06/23/2020 19      (goal A1C is < 7)   (goal LDL is <100) need 30-50% reduction from baseline     BP Readings from Last 3 Encounters:   08/31/20 132/84   08/07/20 112/74   06/25/20 120/80    (goal /80)      All Future Testing planned in [facility]:  Lab Frequency Next Occurrence   ECHO Complete 2D W Doppler W Color Once 07/19/2020   Comprehensive Metabolic Panel, Fasting Once 09/01/2020   TSH without Reflex Once 09/01/2020   CBC Once 09/01/2020   Magnesium Once 08/31/2020       Next Visit Date:  Future Appointments   Date Time Provider Waqas Mcknight   1/25/2021  8:00 AM ANDERSON Beasley - CNP [facility]            Patient Active Problem List:     Hyperlipidemia     Essential hypertension     Status post total hip replacement, left     Encounter for long-term (current) use of medications     Alcohol abuse     Depression     PAF (paroxysmal atrial fibrillation) ([facility] [address]     Status post catheter ablation of atrial fibrillation     Long term current use of antiarrhythmic drug     Long term (current) use of anticoagulants     Macrocytic anemia

## 2020-11-11 RX ORDER — ESCITALOPRAM OXALATE 20 MG/1
20 TABLET ORAL DAILY
Qty: 90 TABLET | Refills: 1 | Status: SHIPPED | OUTPATIENT
Start: 2020-11-11 | End: 2022-07-28 | Stop reason: SDUPTHER

## 2021-01-13 RX ORDER — FENOFIBRATE 160 MG/1
160 TABLET ORAL DAILY
Qty: 90 TABLET | Refills: 1 | Status: SHIPPED | OUTPATIENT
Start: 2021-01-13 | End: 2021-10-22

## 2021-01-18 ENCOUNTER — PATIENT MESSAGE (OUTPATIENT)
Dept: FAMILY MEDICINE CLINIC | Age: 76
End: 2021-01-18

## 2021-01-18 NOTE — TELEPHONE ENCOUNTER
From: Alanis Brown  To: Neto Aguiar, APRN - CNP  Sent: 1/18/2021 2:44 PM EST  Subject: Non-Urgent Medical Question    Huang Ambrocio:    I have decided i should stay with the Eliquest regardless of cost and it being a level three drug. Why would my Medicare not  the deductible? I have enough Eliquest for the next two+ weeks so order the 90 day supply please. So far so good on the last cardio version. Now I must suffer with the Hayes Sprang about exercise. Did you find me a woman yet? Needs to be at least older than my daughter and not as much high octane as her. Can you give me the dates of my first EKG done in 2020 that was okay and then the date you found my heart was out of rythem sometime in June I think when this all started over again. First that than COVID and lost job in March 2020. Still trying to connect the stress from 9 years ago.

## 2021-01-28 DIAGNOSIS — I48.11 LONGSTANDING PERSISTENT ATRIAL FIBRILLATION (HCC): ICD-10-CM

## 2021-01-28 DIAGNOSIS — Z79.01 LONG TERM (CURRENT) USE OF ANTICOAGULANTS: Primary | ICD-10-CM

## 2021-01-28 NOTE — TELEPHONE ENCOUNTER
Patient clarified and notified patient has been taking the medication and provider approved it and patient notified that medication has been refilled.

## 2021-03-16 ENCOUNTER — TELEPHONE (OUTPATIENT)
Dept: FAMILY MEDICINE CLINIC | Age: 76
End: 2021-03-16

## 2021-03-16 NOTE — TELEPHONE ENCOUNTER
Patient's daughter, Justin Mabry ([phone]), called requesting a HH evaluation d/t ongoing weakness and frequent falls. Family feels he may need in home PT and OT.

## 2021-03-17 NOTE — TELEPHONE ENCOUNTER
Martina Maite spoke about the need to start the process of having home health come into his home and help with some strenghtening exercises. Martina Arora stated that she was going to call and try to talk him into setting up an appt before the ablation on 4/9/21 to avoid delays in care after. Martina Arora did schedule on 3/24/21 to discuss setting up [address] [facility] [address]. Patient did agree.

## 2021-03-17 NOTE — TELEPHONE ENCOUNTER
Pt daughter Ravi Acosta called wanting to see about setting up Home care for her dad John Morrell. She stated that she talked to someone last week about this. Writer opened the note and found out that Debbie Alonzo is handling this. forwarding to [address].

## 2021-03-17 NOTE — TELEPHONE ENCOUNTER
Patient is having an ablation on 4/9/21. Patient will call on Monday 4/12/21 to let us know if an appt is needed.

## 2021-03-24 ENCOUNTER — TELEMEDICINE (OUTPATIENT)
Dept: FAMILY MEDICINE CLINIC | Age: 76
End: 2021-03-24
Payer: MEDICARE

## 2021-03-24 DIAGNOSIS — I48.11 LONGSTANDING PERSISTENT ATRIAL FIBRILLATION (HCC): Primary | ICD-10-CM

## 2021-03-24 DIAGNOSIS — R53.1 GENERALIZED WEAKNESS: ICD-10-CM

## 2021-03-24 DIAGNOSIS — Z96.642 STATUS POST TOTAL HIP REPLACEMENT, LEFT: ICD-10-CM
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
## 2021-03-24 DIAGNOSIS — I10 HYPERTENSION, UNSPECIFIED TYPE: ICD-10-CM

## 2021-03-24 PROCEDURE — 99215 OFFICE O/P EST HI 40 MIN: CPT | Performed by: NURSE PRACTITIONER

## 2021-03-24 PROCEDURE — G8427 DOCREV CUR MEDS BY ELIG CLIN: HCPCS | Performed by: NURSE PRACTITIONER

## 2021-03-24 PROCEDURE — 4040F PNEUMOC VAC/ADMIN/RCVD: CPT | Performed by: NURSE PRACTITIONER

## 2021-03-24 PROCEDURE — 1123F ACP DISCUSS/DSCN MKR DOCD: CPT | Performed by: NURSE PRACTITIONER

## 2021-03-24 RX ORDER — AMIODARONE HYDROCHLORIDE 200 MG/1
200 TABLET ORAL DAILY
COMMUNITY
Start: 2020-11-30 | End: 2021-05-11 | Stop reason: ALTCHOICE

## 2021-03-24 RX ORDER — MAGNESIUM OXIDE 400 MG/1
400 TABLET ORAL DAILY
COMMUNITY
Start: 2020-11-10 | End: 2022-07-26 | Stop reason: SDUPTHER

## 2021-03-24 NOTE — PROGRESS NOTES
Grace Robles (:  1945) is a 68 y.o. male,Established patient, here for evaluation of the following chief complaint(s): Atrial Fibrillation, Extremity Weakness, and Fall (hign risk )      ASSESSMENT/PLAN:  1. Longstanding persistent atrial fibrillation [facility])  -     External Referral To [address]  2. Hypertension, unspecified type  -     External Referral To [address]  3. Generalized weakness  -     External Referral To [address]  4. Fall, subsequent encounter  -     External Referral To [address]  5. Status post total hip replacement, left  -     External Referral To [address]      Return in about 2 months (around 2021). SUBJECTIVE/OBJECTIVE:  Patient is a 66-year-old male with a significant cardiac history including A. fib with multiple failed interventions. Patient currently is medically managed with anticoagulant and rate control. However his condition has caused him to experience generalized physical deconditioning to a point of which he is unable to ambulate safely around his home. Patient has had a recent fall from a lawn tractor which resulted in a severe excoriation of the left forearm. Patient refused treatment at that time evaluated by virtual is now healing appropriately with recommendation of continued dressing changes. Patient has history of a left total hip replacement, history of depression, history of hypertension and hyperlipidemia which have been medically managed to medication. However due to increasing difficulty with managing his medications patient has not been effectively following his primary care management.   And with continued deconditioning he is unable to get out of the home appropriately without assistance of family and friends. Patient has a longstanding history of alcohol abuse, which family intervention has helped reduce down greatly. With discussion with his daughter present during visit family is concerned due to continuing deconditioning and inability for him to ambulate appropriately around the home we discussed having home health out to evaluate to see assistance as well as palliative care to monitor for continued medical management when patient is unable to get out of the home. Referrals will be placed for further recommendations per services. Review of Systems    Patient-Reported Vitals 3/24/2021   Patient-Reported Weight 252 lb   Patient-Reported Height 71.5 in   Patient-Reported Systolic 345   Patient-Reported Diastolic 80        Physical Exam  Constitutional:       Appearance: Normal appearance. He is well-developed. He is ill-appearing. Eyes:      General:         Right eye: No discharge. Left eye: No discharge. Extraocular Movements: Extraocular movements intact. Conjunctiva/sclera: Conjunctivae normal.   Neck:      Musculoskeletal: Normal range of motion. Thyroid: No thyroid mass. Vascular: No JVD. Pulmonary:      Effort: Pulmonary effort is normal. No respiratory distress. Musculoskeletal:      Right shoulder: He exhibits normal range of motion and no deformity. Left shoulder: He exhibits normal range of motion and no deformity. Skin:     General: Skin is moist.      Coloration: Skin is not cyanotic or jaundiced. Findings: No rash. Comments: Grade 1 skin tear - mild weeping of serous fluid. Mild erythema at site. Neurological:      General: No focal deficit present. Mental Status: He is alert and oriented to person, place, and time. Motor: Weakness present. Gait: Gait is intact. Comments: Visualized, - patient unable to ambulate during visit.  Did follow directions appropriately - but did exhibit ROM bilateral lower extremity. Psychiatric:         Attention and Perception: Attention normal. He does not perceive auditory or visual hallucinations. Mood and Affect: Mood normal.         Speech: Speech normal.             On this date 3/24/2021 I have spent 40 minutes reviewing previous notes, test results and face to face (virtual) with the patient discussing the diagnosis and importance of compliance with the treatment plan as well as documenting on the day of the visit. Chao Client, was evaluated through a synchronous (real-time) audio-video encounter. The patient (or guardian if applicable) is aware that this is a billable service. Verbal consent to proceed has been obtained within the past 12 months. The visit was conducted pursuant to the emergency declaration under the [facility], [address] [place] [address] authority and the [employer] and [employer] General Act. Patient identification was verified, and a caregiver was present when appropriate. The patient was located in a state where the provider was credentialed to provide care. An electronic signature was used to authenticate this note.     --ANDERSON Conway - CNP

## 2021-03-25 NOTE — PATIENT INSTRUCTIONS
It was my pleasure to meet with you today. Please contact me with any questions or concerns, and please notify myself or our manager if there is anyway we can improve our service in your health care needs. Below I have listed some instructions and information that pertain to today's visit.    -You have been advised to continue all current medication, otherwise not discussed in today's visit  -New medications and refills will been sent and made available at pharmacy or mail away  -Heathy daily diet to include low salt and low carbohydrate, low sugar diabetic diet  -Drink 6-8 glasses of water daily  -Continue daily exercise with in your physical capacity  -Continue regular follow up's with specialist including but not limited to: Home Health and Palliative care.
3

## 2021-03-26 ENCOUNTER — TELEPHONE (OUTPATIENT)
Dept: FAMILY MEDICINE CLINIC | Age: 76
End: 2021-03-26

## 2021-03-26 NOTE — TELEPHONE ENCOUNTER
[facility] received a call from:    Name of Caller: Judy Rosado    Relationship to patient: Nurse    Organization name: [facility] contact number: [phone]    Reason for call: Judy Rosado is calling because she wanted to let [facility] know they can't accept patient's referral for home care.

## 2021-04-01 NOTE — TELEPHONE ENCOUNTER
Patient's daughter called regarding order for home health.  Writer noticed previous note that [facility] can't accept referral. New order pended for home health for editing and approval.

## 2021-04-01 NOTE — TELEPHONE ENCOUNTER
Lucy العلي (pts daughter) called stating she has not yet recvd call from [facility]. Writer spoke to Kemi rowland at [facility] and a care coordinator will reach out to daughter today to set everything up. Writer notified Lucy العلي with update.

## 2021-04-28 ENCOUNTER — TELEPHONE (OUTPATIENT)
Dept: FAMILY MEDICINE CLINIC | Age: 76
End: 2021-04-28

## 2021-04-28 DIAGNOSIS — R41.82 ALTERED MENTAL STATUS, UNSPECIFIED ALTERED MENTAL STATUS TYPE: Primary | ICD-10-CM

## 2021-04-28 NOTE — TELEPHONE ENCOUNTER
Patient daughter is contacting the office today for a CT. Patient states that he has been lethargic, his voice is changed but there is no drooping, or any other outward symptoms. No energy. Patient daughter states he is deteriorating. Patient is concerned with stroke and the rate of deterioration. Ablation and labs tomorrow at 2 PM,    Verbal orders by Ellie Cid.

## 2021-05-10 ENCOUNTER — TELEPHONE (OUTPATIENT)
Dept: FAMILY MEDICINE CLINIC | Age: 76
End: 2021-05-10

## 2021-05-11 ENCOUNTER — OFFICE VISIT (OUTPATIENT)
Dept: FAMILY MEDICINE CLINIC | Age: 76
End: 2021-05-11
Payer: MEDICARE

## 2021-05-11 ENCOUNTER — HOSPITAL ENCOUNTER (OUTPATIENT)
Age: 76
Setting detail: SPECIMEN
Discharge: HOME | End: 2021-05-11
Payer: MEDICARE

## 2021-05-11 VITALS
HEART RATE: 60 BPM | DIASTOLIC BLOOD PRESSURE: 68 MMHG | BODY MASS INDEX: 38.22 KG/M2 | SYSTOLIC BLOOD PRESSURE: 130 MMHG | OXYGEN SATURATION: 98 % | WEIGHT: 274 LBS | TEMPERATURE: 97.8 F

## 2021-05-11 DIAGNOSIS — E03.9 HYPOTHYROIDISM, UNSPECIFIED TYPE: ICD-10-CM

## 2021-05-11 DIAGNOSIS — E78.5 HYPERLIPIDEMIA, UNSPECIFIED HYPERLIPIDEMIA TYPE: ICD-10-CM

## 2021-05-11 DIAGNOSIS — I50.22 CHRONIC SYSTOLIC CONGESTIVE HEART FAILURE, NYHA CLASS 3 (HCC): ICD-10-CM

## 2021-05-11 DIAGNOSIS — I48.11 LONGSTANDING PERSISTENT ATRIAL FIBRILLATION (HCC): ICD-10-CM

## 2021-05-11 DIAGNOSIS — Z76.89 ENCOUNTER FOR SUPPORT AND COORDINATION OF TRANSITION OF CARE: Primary | ICD-10-CM

## 2021-05-11 DIAGNOSIS — I10 HYPERTENSION, UNSPECIFIED TYPE: ICD-10-CM

## 2021-05-11 DIAGNOSIS — Z12.5 SCREENING FOR MALIGNANT NEOPLASM OF PROSTATE: ICD-10-CM

## 2021-05-11 DIAGNOSIS — E55.9 VITAMIN D DEFICIENCY: ICD-10-CM

## 2021-05-11 DIAGNOSIS — F34.1 DYSTHYMIA: ICD-10-CM

## 2021-05-11 LAB — TSH SERPL DL<=0.05 MIU/L-ACNC: 80.63 MIU/L (ref 0.3–5)

## 2021-05-11 PROCEDURE — 99496 TRANSJ CARE MGMT HIGH F2F 7D: CPT | Performed by: NURSE PRACTITIONER

## 2021-05-11 RX ORDER — METOPROLOL SUCCINATE 25 MG/1
75 TABLET, EXTENDED RELEASE ORAL NIGHTLY
Qty: 90 TABLET | Refills: 0 | Status: SHIPPED | OUTPATIENT
Start: 2021-05-11 | End: 2022-02-23 | Stop reason: ALTCHOICE

## 2021-05-11 RX ORDER — AMLODIPINE BESYLATE 5 MG/1
5 TABLET ORAL DAILY
Qty: 90 TABLET | Refills: 0 | Status: SHIPPED
Start: 2021-05-11 | End: 2021-10-22

## 2021-05-11 RX ORDER — AMLODIPINE BESYLATE 10 MG/1
10 TABLET ORAL DAILY
COMMUNITY
Start: 2021-05-08 | End: 2021-05-11 | Stop reason: DRUGHIGH

## 2021-05-11 RX ORDER — ATORVASTATIN CALCIUM 10 MG/1
TABLET, FILM COATED ORAL
COMMUNITY
Start: 2021-05-08 | End: 2021-05-11

## 2021-05-11 RX ORDER — ROSUVASTATIN CALCIUM 20 MG/1
20 TABLET, COATED ORAL NIGHTLY
Qty: 30 TABLET | Refills: 3
Start: 2021-05-11 | End: 2021-10-22

## 2021-05-11 RX ORDER — FAMOTIDINE 20 MG/1
20 TABLET, FILM COATED ORAL DAILY
COMMUNITY
Start: 2021-05-01 | End: 2022-02-23 | Stop reason: ALTCHOICE

## 2021-05-11 RX ORDER — METOPROLOL SUCCINATE 25 MG/1
75 TABLET, EXTENDED RELEASE ORAL NIGHTLY
COMMUNITY
Start: 2021-05-01 | End: 2021-05-11

## 2021-05-11 RX ORDER — FUROSEMIDE 20 MG/1
20 TABLET ORAL PRN
Qty: 30 TABLET | Refills: 0
Start: 2021-05-11 | End: 2021-10-22

## 2021-05-11 RX ORDER — LEVOTHYROXINE SODIUM 0.1 MG/1
100 TABLET ORAL DAILY
Qty: 30 TABLET | Refills: 0 | Status: SHIPPED | OUTPATIENT
Start: 2021-05-11 | End: 2021-05-27 | Stop reason: ALTCHOICE

## 2021-05-11 RX ORDER — LEVOTHYROXINE SODIUM 0.1 MG/1
100 TABLET ORAL DAILY
COMMUNITY
Start: 2021-05-02 | End: 2021-05-11 | Stop reason: SDUPTHER

## 2021-05-11 RX ORDER — METOPROLOL SUCCINATE 50 MG/1
TABLET, EXTENDED RELEASE ORAL
COMMUNITY
Start: 2021-05-08 | End: 2021-05-11

## 2021-05-11 NOTE — PROGRESS NOTES
[facility]   [address]   [phone]    2021     CHIEF COMPLAINT:     Amparo Shaver (:  1945) is a 68 y.o. male, here for evaluation of the following chief complaint(s):  Follow-Up from [address] [place] [address]      No Known Allergies    Current Outpatient Medications   Medication Sig Dispense Refill    amLODIPine (NORVASC) 5 MG tablet Take 1 tablet by mouth daily for 90 doses 90 tablet 0    metoprolol succinate (TOPROL XL) 25 MG extended release tablet Take 3 tablets by mouth nightly for 30 doses 90 tablet 0    furosemide (LASIX) 20 MG tablet Take 1 tablet by mouth as needed (swelling) 30 tablet 0    rosuvastatin (CRESTOR) 20 MG tablet Take 1 tablet by mouth nightly 30 tablet 3    levothyroxine (SYNTHROID) 100 MCG tablet Take 1 tablet by mouth daily 30 tablet 0    magnesium oxide (MAG-OX) 400 MG tablet Take 400 mg by mouth daily      apixaban (ELIQUIS) 5 MG TABS tablet Take 1 tablet by mouth 2 times daily 180 tablet 3    fenofibrate (TRIGLIDE) 160 MG tablet Take 1 tablet by mouth daily 90 tablet 1    escitalopram (LEXAPRO) 20 MG tablet Take 1 tablet by mouth daily 90 tablet 1    thiamine 100 MG tablet Take 1 tablet by mouth daily 30 tablet 11    folic acid (FOLVITE) 1 MG tablet Take 1 tablet by mouth daily 30 tablet 11    vitamin B-12 (CYANOCOBALAMIN) 500 MCG tablet Take 500 mcg by mouth daily      famotidine (PEPCID) 20 MG tablet Take 20 mg by mouth daily       No current facility-administered medications for this visit. Patient Care Team:  ANDERSON Degroot CNP as PCP - General (Nurse Practitioner)  ANDERSON Degroot CNP [facility]  Sandra Gibbs MD as Consulting Physician    REVIEW OF SYSTEMS:     Review of Systems   Constitutional: Positive for fatigue and unexpected weight change (5 lbs ).        HISTORY OF PRESENT ILLNESS     Transition of Care    Admitted to [facility]: continues with home health, as well as will be participating in weekly exercise at gym with family member. PHYSICAL EXAM:     /68   Pulse 60   Temp 97.8 °F (36.6 °C) (Temporal)   Wt 274 lb (124.3 kg)   SpO2 98%   BMI 38.22 kg/m²      Physical Exam  Vitals signs reviewed. Constitutional:       Appearance: Normal appearance. He is obese. He is not ill-appearing. HENT:      Head: Normocephalic and atraumatic. Eyes:      Extraocular Movements: Extraocular movements intact. Pupils: Pupils are equal, round, and reactive to light. Neck:      Musculoskeletal: Normal range of motion and neck supple. Vascular: No carotid bruit. Cardiovascular:      Rate and Rhythm: Normal rate and regular rhythm. Pulses: Normal pulses. Heart sounds: Heart sounds are distant. Pulmonary:      Effort: Pulmonary effort is normal.      Breath sounds: Normal breath sounds. Abdominal:      General: Abdomen is flat. Bowel sounds are normal. There is no distension. Palpations: Abdomen is soft. There is no mass. Tenderness: There is no abdominal tenderness. There is no right CVA tenderness or left CVA tenderness. Musculoskeletal: Normal range of motion. General: No swelling or tenderness. Right lower leg: Edema present. Left lower leg: Edema present. Skin:     General: Skin is warm. Capillary Refill: Capillary refill takes less than 2 seconds. Findings: No erythema or rash. Neurological:      General: No focal deficit present. Mental Status: He is alert and oriented to person, place, and time. Motor: Weakness (improving) present. Gait: Gait abnormal (cane assist). Psychiatric:         Mood and Affect: Mood normal.         Behavior: Behavior normal.          PROCEDURE/ IN OFFICE TESTING     No in office testing or procedures completed during today's office visit. ASSESSMENT/PLAN/ FOLLOWUP:     1.  Encounter for support and coordination of transition of care  completed  2. Longstanding persistent atrial fibrillation (HCC)  Stable, Medicated, Monitored by cardiology  3. Hypothyroidism, unspecified type  Restart medication  -     levothyroxine (SYNTHROID) 100 MCG tablet; Take 1 tablet by mouth daily, Disp-30 tablet, R-0Normal  -     TSH without Reflex; Future  -     TSH without Reflex; Future  4. Hyperlipidemia, unspecified hyperlipidemia type  -continue with current medication as prescribed  -healthy diet as discussed  -daily exercise with in physical limitations  -     rosuvastatin (CRESTOR) 20 MG tablet; Take 1 tablet by mouth nightly, Disp-30 tablet, R-3NO PRINT  -     CBC; Future  -     Comprehensive Metabolic Panel, Fasting; Future  -     Lipid Panel; Future  5. Hypertension, unspecified type  -continue with current medication as prescribed  6. Chronic systolic congestive heart failure, NYHA class 3 (HCC)  Stable, Medicated, Monitored  -     furosemide (LASIX) 20 MG tablet; Take 1 tablet by mouth as needed (swelling), Disp-30 tablet, R-0NO PRINT  -     CBC; Future  7. Dysthymia  -     metoprolol succinate (TOPROL XL) 25 MG extended release tablet; Take 3 tablets by mouth nightly for 30 doses, Disp-90 tablet, R-0Normal  8. Screening for malignant neoplasm of prostate  -continue with current medication as prescribed  -     PSA screening; Future  9. Vitamin D deficiency  -healthy diet as discussed  -daily exercise with in physical limitations  -continue with current medication as prescribed  -     CBC; Future  -     Vitamin D 25 Hydroxy; Future      Return in about 2 months (around 7/11/2021) for and 1 year Annual Medicare Wellness Visit, symptom check and lab review. COMMUNICATION:             The best way to find yourself is to lose yourself in the service of others - [address] [place] [address]. [address] [facility]   [email or web address]  Office: [phone]     An electronic signature was used

## 2021-05-24 DIAGNOSIS — E03.9 HYPOTHYROIDISM, UNSPECIFIED TYPE: ICD-10-CM

## 2021-05-24 DIAGNOSIS — I10 HYPERTENSION, UNSPECIFIED TYPE: Primary | ICD-10-CM

## 2021-05-26 DIAGNOSIS — E03.9 HYPOTHYROIDISM, UNSPECIFIED TYPE: ICD-10-CM

## 2021-05-26 DIAGNOSIS — I10 HYPERTENSION, UNSPECIFIED TYPE: ICD-10-CM

## 2021-05-26 LAB — TSH SERPL DL<=0.05 MIU/L-ACNC: 85.95 UIU/ML

## 2021-05-27 DIAGNOSIS — E03.9 HYPOTHYROIDISM, UNSPECIFIED TYPE: Primary | ICD-10-CM

## 2021-05-27 RX ORDER — LEVOTHYROXINE SODIUM 0.12 MG/1
125 TABLET ORAL DAILY
Qty: 30 TABLET | Refills: 0 | Status: SHIPPED | OUTPATIENT
Start: 2021-05-27 | End: 2021-06-28 | Stop reason: SDUPTHER

## 2021-06-25 ENCOUNTER — PATIENT MESSAGE (OUTPATIENT)
Dept: FAMILY MEDICINE CLINIC | Age: 76
End: 2021-06-25

## 2021-06-25 DIAGNOSIS — E03.9 HYPOTHYROIDISM, UNSPECIFIED TYPE: ICD-10-CM

## 2021-06-25 NOTE — TELEPHONE ENCOUNTER
From: Minnie Wu  To: Paulina Ng, ANDERSON - CNP  Sent: 6/25/2021 1:07 PM EDT  Subject: Non-Urgent Medical Question    *We have 1 more week of the 125mg thyroid med. Should we get a refill? Thyroid  is July 12th    *my dad was contacted by [facility] Extra Plus (O-POS D-SNP)  Offering a plan 0-monthly cost  Have you ever heard of such a plan. . I will attach email

## 2021-06-28 RX ORDER — LEVOTHYROXINE SODIUM 0.12 MG/1
125 TABLET ORAL DAILY
Qty: 30 TABLET | Refills: 0 | Status: SHIPPED | OUTPATIENT
Start: 2021-06-28 | End: 2022-08-30

## 2021-07-29 LAB
T4 TOTAL: 2.9
TSH SERPL DL<=0.05 MIU/L-ACNC: 60.3 UIU/ML

## 2021-07-30 DIAGNOSIS — I48.11 LONGSTANDING PERSISTENT ATRIAL FIBRILLATION (HCC): ICD-10-CM

## 2021-07-30 DIAGNOSIS — I10 HYPERTENSION, UNSPECIFIED TYPE: ICD-10-CM

## 2021-10-01 LAB
ALBUMIN SERPL-MCNC: 3 G/DL
ALP BLD-CCNC: 75 U/L
ALT SERPL-CCNC: 7 U/L
AST SERPL-CCNC: 20 U/L
BASOPHILS ABSOLUTE: 0 /ΜL
BASOPHILS RELATIVE PERCENT: 0.5 %
BILIRUB SERPL-MCNC: 0.5 MG/DL (ref 0.1–1.4)
BUN BLDV-MCNC: 18 MG/DL
CALCIUM SERPL-MCNC: 8.5 MG/DL
CHLORIDE BLD-SCNC: 109 MMOL/L
CHOLESTEROL, TOTAL: 102 MG/DL
CHOLESTEROL/HDL RATIO: 2.1
CO2: 25 MMOL/L
CREAT SERPL-MCNC: 0.94 MG/DL
EOSINOPHILS ABSOLUTE: 0.1 /ΜL
EOSINOPHILS RELATIVE PERCENT: 1.1 %
GLUCOSE FASTING: 103 MG/DL
HCT VFR BLD CALC: 38.5 % (ref 41–53)
HDLC SERPL-MCNC: 49 MG/DL (ref 35–70)
HEMOGLOBIN: 12.7 G/DL (ref 13.5–17.5)
LDL CHOLESTEROL CALCULATED: 37 MG/DL (ref 0–160)
LYMPHOCYTES ABSOLUTE: 1.6 /ΜL
LYMPHOCYTES RELATIVE PERCENT: 27.3 %
MCH RBC QN AUTO: 35.4 PG
MCHC RBC AUTO-ENTMCNC: 33.1 G/DL
MCV RBC AUTO: 107 FL
MONOCYTES ABSOLUTE: 0.8 /ΜL
MONOCYTES RELATIVE PERCENT: 12.5 %
NEUTROPHILS ABSOLUTE: 3.5 /ΜL
NEUTROPHILS RELATIVE PERCENT: 58.6 %
NONHDLC SERPL-MCNC: NORMAL MG/DL
PLATELET # BLD: 237 K/ΜL
PMV BLD AUTO: 9 FL
POTASSIUM SERPL-SCNC: 3.9 MMOL/L
RBC # BLD: 3.59 10^6/ΜL
SODIUM BLD-SCNC: 145 MMOL/L
TOTAL PROTEIN: 5.5 G/DL (ref 6.4–8.2)
TRIGL SERPL-MCNC: 80 MG/DL
TSH SERPL DL<=0.05 MIU/L-ACNC: 23.56 UIU/ML
VLDLC SERPL CALC-MCNC: NORMAL MG/DL
WBC # BLD: 6 10^3/ML

## 2021-10-04 DIAGNOSIS — E55.9 VITAMIN D DEFICIENCY: ICD-10-CM

## 2021-10-04 DIAGNOSIS — Z12.5 SCREENING FOR MALIGNANT NEOPLASM OF PROSTATE: ICD-10-CM

## 2021-10-04 DIAGNOSIS — I50.22 CHRONIC SYSTOLIC CONGESTIVE HEART FAILURE, NYHA CLASS 3 (HCC): ICD-10-CM

## 2021-10-04 DIAGNOSIS — E03.9 HYPOTHYROIDISM, UNSPECIFIED TYPE: ICD-10-CM

## 2021-10-04 DIAGNOSIS — E78.5 HYPERLIPIDEMIA, UNSPECIFIED HYPERLIPIDEMIA TYPE: ICD-10-CM

## 2021-10-04 DIAGNOSIS — I10 HYPERTENSION, UNSPECIFIED TYPE: ICD-10-CM

## 2021-10-21 DIAGNOSIS — I50.22 CHRONIC SYSTOLIC CONGESTIVE HEART FAILURE, NYHA CLASS 3 (HCC): ICD-10-CM

## 2021-10-21 DIAGNOSIS — E78.5 HYPERLIPIDEMIA, UNSPECIFIED HYPERLIPIDEMIA TYPE: ICD-10-CM

## 2021-10-22 NOTE — TELEPHONE ENCOUNTER
Last visit: 05/11/2021      Next Visit Date:  No future appointments.     Health Maintenance   Topic Date Due    COVID-19 Vaccine (1) Never done    DTaP/Tdap/Td vaccine (1 - Tdap) Never done    Shingles Vaccine (1 of 2) Never done    Pneumococcal 65+ years Vaccine (1 of 1 - PPSV23) Never done   [employer] Visit (AWV)  Never done    Flu vaccine (1) Never done    Lipid screen  10/01/2022    Potassium monitoring  10/01/2022    Creatinine monitoring  10/01/2022    Hepatitis A vaccine  Aged Out    Hepatitis B vaccine  Aged Out    Hib vaccine  Aged Out    Meningococcal (ACWY) vaccine  Aged Out    Hepatitis C screen  Discontinued       No results found for: LABA1C          ( goal A1C is < 7)   No results found for: LABMICR  LDL Cholesterol (mg/dL)   Date Value   06/22/2020 53     LDL Calculated (mg/dL)   Date Value   10/01/2021 37       (goal LDL is <100)   AST (U/L)   Date Value   10/01/2021 20     ALT (U/L)   Date Value   10/01/2021 7     BUN (mg/dL)   Date Value   10/01/2021 18     BP Readings from Last 3 Encounters:   05/11/21 130/68   08/31/20 132/84   08/07/20 112/74          (goal 120/80)    All Future Testing planned in [facility]  Lab Frequency Next Occurrence   Urinalysis Reflex to Culture Once 04/28/2021   CT HEAD WO CONTRAST Once 04/28/2022               Patient Active Problem List:     Hyperlipidemia     Essential hypertension     Status post total hip replacement, left     Encounter for long-term (current) use of medications     Alcohol abuse     Depression     PAF (paroxysmal atrial fibrillation) ([address]     Status post catheter ablation of atrial fibrillation     Long term current use of antiarrhythmic drug     Long term (current) use of anticoagulants     Macrocytic anemia     Longstanding persistent atrial fibrillation (HCC)     Chronic systolic congestive heart failure, NYHA class 3 ([address]

## 2021-10-25 RX ORDER — FENOFIBRATE 160 MG/1
160 TABLET ORAL DAILY
Qty: 90 TABLET | Refills: 1 | Status: SHIPPED | OUTPATIENT
Start: 2021-10-25 | End: 2022-07-28 | Stop reason: SDUPTHER

## 2021-10-25 RX ORDER — ROSUVASTATIN CALCIUM 20 MG/1
20 TABLET, COATED ORAL DAILY
Qty: 90 TABLET | Refills: 1 | Status: SHIPPED | OUTPATIENT
Start: 2021-10-25 | End: 2022-07-28 | Stop reason: SDUPTHER

## 2021-10-25 RX ORDER — FUROSEMIDE 20 MG/1
20 TABLET ORAL DAILY
Qty: 90 TABLET | Refills: 1 | Status: SHIPPED | OUTPATIENT
Start: 2021-10-25 | End: 2022-07-28 | Stop reason: SDUPTHER

## 2021-10-25 RX ORDER — LANOLIN ALCOHOL/MO/W.PET/CERES
100 CREAM (GRAM) TOPICAL DAILY
Qty: 90 TABLET | Refills: 1 | Status: SHIPPED | OUTPATIENT
Start: 2021-10-25 | End: 2022-07-26 | Stop reason: SDUPTHER

## 2021-10-25 RX ORDER — AMLODIPINE BESYLATE 5 MG/1
5 TABLET ORAL DAILY
Qty: 90 TABLET | Refills: 1 | Status: SHIPPED | OUTPATIENT
Start: 2021-10-25 | End: 2022-07-28 | Stop reason: SDUPTHER

## 2021-10-25 RX ORDER — METOPROLOL SUCCINATE 100 MG/1
100 TABLET, EXTENDED RELEASE ORAL NIGHTLY
Qty: 90 TABLET | Refills: 1 | Status: SHIPPED | OUTPATIENT
Start: 2021-10-25 | End: 2022-07-28 | Stop reason: SDUPTHER

## 2021-10-25 RX ORDER — FOLIC ACID 1 MG/1
1 TABLET ORAL DAILY
Qty: 90 TABLET | Refills: 1 | Status: SHIPPED | OUTPATIENT
Start: 2021-10-25 | End: 2022-07-28 | Stop reason: SDUPTHER

## 2022-02-22 DIAGNOSIS — E55.9 VITAMIN D DEFICIENCY: ICD-10-CM

## 2022-02-22 DIAGNOSIS — E03.9 HYPOTHYROIDISM, UNSPECIFIED TYPE: Primary | ICD-10-CM

## 2022-02-22 DIAGNOSIS — Z12.5 SCREENING PSA (PROSTATE SPECIFIC ANTIGEN): ICD-10-CM

## 2022-02-23 ENCOUNTER — HOSPITAL ENCOUNTER (OUTPATIENT)
Age: 77
Setting detail: SPECIMEN
Discharge: HOME | End: 2022-02-23

## 2022-02-23 ENCOUNTER — OFFICE VISIT (OUTPATIENT)
Dept: FAMILY MEDICINE CLINIC | Age: 77
End: 2022-02-23
Payer: MEDICARE

## 2022-02-23 VITALS
HEIGHT: 71 IN | WEIGHT: 267 LBS | OXYGEN SATURATION: 98 % | DIASTOLIC BLOOD PRESSURE: 80 MMHG | SYSTOLIC BLOOD PRESSURE: 138 MMHG | TEMPERATURE: 97.4 F | BODY MASS INDEX: 37.38 KG/M2 | HEART RATE: 78 BPM

## 2022-02-23 DIAGNOSIS — I48.11 LONGSTANDING PERSISTENT ATRIAL FIBRILLATION (HCC): ICD-10-CM

## 2022-02-23 DIAGNOSIS — I50.22 CHRONIC SYSTOLIC CONGESTIVE HEART FAILURE, NYHA CLASS 3 (HCC): ICD-10-CM

## 2022-02-23 DIAGNOSIS — E78.5 HYPERLIPIDEMIA, UNSPECIFIED HYPERLIPIDEMIA TYPE: ICD-10-CM

## 2022-02-23 DIAGNOSIS — Z23 NEED FOR PNEUMOCOCCAL VACCINATION: ICD-10-CM

## 2022-02-23 DIAGNOSIS — E66.01 SEVERE OBESITY (BMI 35.0-39.9) WITH COMORBIDITY (HCC): ICD-10-CM

## 2022-02-23 DIAGNOSIS — E55.9 VITAMIN D DEFICIENCY: ICD-10-CM

## 2022-02-23 DIAGNOSIS — G62.89 OTHER POLYNEUROPATHY: ICD-10-CM

## 2022-02-23 DIAGNOSIS — I10 ESSENTIAL HYPERTENSION: ICD-10-CM

## 2022-02-23 DIAGNOSIS — Z23 NEED FOR PROPHYLACTIC VACCINATION AGAINST DIPHTHERIA-TETANUS-PERTUSSIS (DTP): ICD-10-CM

## 2022-02-23 DIAGNOSIS — Z23 NEED FOR TETANUS, DIPHTHERIA, AND ACELLULAR PERTUSSIS (TDAP) VACCINE: ICD-10-CM

## 2022-02-23 DIAGNOSIS — Z12.5 SCREENING PSA (PROSTATE SPECIFIC ANTIGEN): ICD-10-CM

## 2022-02-23 DIAGNOSIS — Z00.00 MEDICARE ANNUAL WELLNESS VISIT, SUBSEQUENT: Primary | ICD-10-CM

## 2022-02-23 DIAGNOSIS — E03.9 HYPOTHYROIDISM, UNSPECIFIED TYPE: ICD-10-CM

## 2022-02-23 LAB
PROSTATE SPECIFIC ANTIGEN: 2.32 UG/L
TSH SERPL DL<=0.05 MIU/L-ACNC: 30.28 MIU/L (ref 0.3–5)
VITAMIN D 25-HYDROXY: 71 NG/ML (ref 30–100)

## 2022-02-23 PROCEDURE — G8484 FLU IMMUNIZE NO ADMIN: HCPCS | Performed by: NURSE PRACTITIONER

## 2022-02-23 PROCEDURE — G0438 PPPS, INITIAL VISIT: HCPCS | Performed by: NURSE PRACTITIONER

## 2022-02-23 PROCEDURE — 90732 PPSV23 VACC 2 YRS+ SUBQ/IM: CPT | Performed by: NURSE PRACTITIONER

## 2022-02-23 PROCEDURE — 4040F PNEUMOC VAC/ADMIN/RCVD: CPT | Performed by: NURSE PRACTITIONER

## 2022-02-23 PROCEDURE — G0009 ADMIN PNEUMOCOCCAL VACCINE: HCPCS | Performed by: NURSE PRACTITIONER

## 2022-02-23 PROCEDURE — 1123F ACP DISCUSS/DSCN MKR DOCD: CPT | Performed by: NURSE PRACTITIONER

## 2022-02-23 RX ORDER — LEVOTHYROXINE SODIUM 0.15 MG/1
TABLET ORAL
COMMUNITY
Start: 2022-01-14 | End: 2022-08-30 | Stop reason: SDUPTHER

## 2022-02-23 SDOH — ECONOMIC STABILITY: FOOD INSECURITY: WITHIN THE PAST 12 MONTHS, THE FOOD YOU BOUGHT JUST DIDN'T LAST AND YOU DIDN'T HAVE MONEY TO GET MORE.: NEVER TRUE

## 2022-02-23 SDOH — ECONOMIC STABILITY: FOOD INSECURITY: WITHIN THE PAST 12 MONTHS, YOU WORRIED THAT YOUR FOOD WOULD RUN OUT BEFORE YOU GOT MONEY TO BUY MORE.: NEVER TRUE

## 2022-02-23 ASSESSMENT — LIFESTYLE VARIABLES
HOW OFTEN DURING THE LAST YEAR HAVE YOU NEEDED AN ALCOHOLIC DRINK FIRST THING IN THE MORNING TO GET YOURSELF GOING AFTER A NIGHT OF HEAVY DRINKING: 3
HOW OFTEN DURING THE LAST YEAR HAVE YOU FAILED TO DO WHAT WAS NORMALLY EXPECTED FROM YOU BECAUSE OF DRINKING: 3
HAS A RELATIVE, FRIEND, DOCTOR, OR ANOTHER HEALTH PROFESSIONAL EXPRESSED CONCERN ABOUT YOUR DRINKING OR SUGGESTED YOU CUT DOWN: 4
HOW OFTEN DO YOU HAVE A DRINK CONTAINING ALCOHOL: 4 OR MORE TIMES A WEEK
HOW OFTEN DURING THE LAST YEAR HAVE YOU FOUND THAT YOU WERE NOT ABLE TO STOP DRINKING ONCE YOU HAD STARTED: 4
HOW MANY STANDARD DRINKS CONTAINING ALCOHOL DO YOU HAVE ON A TYPICAL DAY: 5 OR 6
HAVE YOU OR SOMEONE ELSE BEEN INJURED AS A RESULT OF YOUR DRINKING: 2

## 2022-02-23 ASSESSMENT — PATIENT HEALTH QUESTIONNAIRE - PHQ9
SUM OF ALL RESPONSES TO PHQ QUESTIONS 1-9: 0
2. FEELING DOWN, DEPRESSED OR HOPELESS: 0
SUM OF ALL RESPONSES TO PHQ QUESTIONS 1-9: 0
SUM OF ALL RESPONSES TO PHQ9 QUESTIONS 1 & 2: 0
SUM OF ALL RESPONSES TO PHQ QUESTIONS 1-9: 0
SUM OF ALL RESPONSES TO PHQ QUESTIONS 1-9: 0
1. LITTLE INTEREST OR PLEASURE IN DOING THINGS: 0

## 2022-02-23 ASSESSMENT — SOCIAL DETERMINANTS OF HEALTH (SDOH): HOW HARD IS IT FOR YOU TO PAY FOR THE VERY BASICS LIKE FOOD, HOUSING, MEDICAL CARE, AND HEATING?: NOT HARD AT ALL

## 2022-02-23 NOTE — PROGRESS NOTES
Medicare Annual Wellness Visit    Rowena Edmonds is here for [address] was seen today for discuss labs. Diagnoses and all orders for this visit:    Medicare annual wellness visit, subsequent    Need for pneumococcal vaccination  -     Pneumococcal polysaccharide vaccine 23-valent greater than or equal to 3yo subcutaneous/IM    Need for tetanus, diphtheria, and acellular pertussis (Tdap) vaccine  -     Tetanus-Diphth-Acell Pertussis (BOOSTRIX) 5-2.5-18.5 LF-MCG/0.5 injection; Inject 0.5 mLs into the muscle once for 1 dose    Severe obesity (BMI 35.0-39. 9) with comorbidity ([address]    Need for prophylactic vaccination against diphtheria-tetanus-pertussis (DTP)  -     Discontinue: Tdap (ADACEL) 5-2-15.5 LF-MCG/0.5 injection; Inject 0.5 mLs into the muscle once for 1 dose    Chronic systolic congestive heart failure, NYHA class 3 (HCC)    Longstanding persistent atrial fibrillation (HCC)    Other polyneuropathy  Comments:  bilateral foot pain/burning sensation  Discussed Gabapentin Cream from compound pharmacy. Essential hypertension    Hyperlipidemia, unspecified hyperlipidemia type         Recommendations for Preventive Services Due: see orders and patient instructions/AVS.  Recommended screening schedule for the next 5-10 years is provided to the patient in written form: see Patient Instructions/AVS.     Return in 6 months (on 8/23/2022) for Medicare Annual Wellness Visit in 1 year, Blood pressure check/ hypertension. Reviewed and updated this visit:  Tobacco  Allergies  Meds  Med Hx  Surg Hx  Soc Hx  Fam Hx      Subjective       Patient's complete Health Risk Assessment and screening values have been reviewed and are found in Flowsheets. The following problems were reviewed today and where indicated follow up appointments were made and/or referrals ordered.     Positive Risk Factor Screenings with Interventions:        Alcohol Screening:       A score of 8 or more is associated with harmful or hazardous drinking. A score of 13 or more in women, and 15 or more in men, is likely to indicate alcohol dependence.     Substance Abuse - Alcohol Interventions:  educational materials provided          General Health and ACP:  General  In general, how would you say your health is?: Very Good (believes his age and has recovery from recent years.)  In the past 7 days, have you experienced any of the following: New or Increased Pain, New or Increased Fatigue, Loneliness, Social Isolation, Stress or Anger?: (!) Yes (discussed bilateral foot pain at night)  Select all that apply: (!) New or Increased Pain (discussed in e/m)  Do you get the social and emotional support that you need?: Yes (daugters are taking well care of him)  Do you have a Living Will?: Yes (requested copy of living will and KODY)    Advance Directives     Power of [address] [place] [address] Will ACP-Advance Directive ACP-Power of     Not on File Not on File Not on File Not on [address] Interventions:  · addressed in E/M    Health Habits/Nutrition:     Physical Activity: Insufficiently Active    Days of Exercise per Week: 2 days    Minutes of Exercise per Session: 20 min     Have you lost any weight without trying in the past 3 months?: No  Body mass index: (!) 37.23  Have you seen the dentist within the past year?: Yes (Sees Dentist in [facility])    Health Habits/Nutrition Interventions:  · Nutritional issues:  educational materials for healthy, well-balanced diet provided     Safety:  Do you have working smoke detectors?: Yes  Do you have any tripping hazards - loose or unsecured carpets or rugs?: (!) Yes  Do you have any tripping hazards - clutter in doorways, halls, or stairs?: No  Do you have either shower bars, grab bars, non-slip mats or non-slip surfaces in your shower or bathtub?: Yes  Do all of your stairways have a railing or banister?: Yes  Do you always fasten your seatbelt when you are in a car?: Yes    Safety Interventions:  · Home safety tips provided        Objective   Vitals:    02/23/22 1119   BP: 138/80   Pulse: 78   Temp: 97.4 °F (36.3 °C)   TempSrc: Tympanic   SpO2: 98%   Weight: 267 lb (121.1 kg)   Height: 5' 11\" (1.803 m)      Body mass index is 37.24 kg/m². No Known Allergies  Prior to Visit Medications    Medication Sig Taking?  Authorizing Provider   metoprolol succinate (TOPROL XL) 100 MG extended release tablet Take 1 tablet by mouth nightly Yes ANDERSON Lehman CNP   thiamine (GNP VITAMIN B-1) 100 MG tablet Take 1 tablet by mouth daily Yes ANDERSON Lehman CNP   furosemide (LASIX) 20 MG tablet Take 1 tablet by mouth daily Yes ANDERSON Lehman CNP   folic acid (FOLVITE) 1 MG tablet Take 1 tablet by mouth daily Yes ANDERSON Lehman CNP   rosuvastatin (CRESTOR) 20 MG tablet Take 1 tablet by mouth daily Yes ANDERSON Lehman CNP   fenofibrate (TRIGLIDE) 160 MG tablet Take 1 tablet by mouth daily Yes ANDERSON Lehman CNP   amLODIPine (NORVASC) 5 MG tablet Take 1 tablet by mouth daily Yes ANDERSON Lehman CNP   magnesium oxide (MAG-OX) 400 MG tablet Take 400 mg by mouth daily Yes Historical Provider, MD   escitalopram (LEXAPRO) 20 MG tablet Take 1 tablet by mouth daily Yes ANDERSON Lehman CNP   vitamin B-12 (CYANOCOBALAMIN) 500 MCG tablet Take 500 mcg by mouth daily Yes Historical Provider, MD   levothyroxine (SYNTHROID) 150 MCG tablet take 1 tablet by mouth once daily  Historical Provider, MD   levothyroxine (SYNTHROID) 125 MCG tablet Take 1 tablet by mouth daily  Patient not taking: Reported on 2/23/2022  ANDERSON Lehman CNP       CareTeam (Including outside providers/suppliers regularly involved in providing care):   Patient Care Team:  ANDERSON Lehman CNP as PCP - General (Nurse Practitioner)  ANDERSON eLhman CNP as PCP - [facility] Empaneled Provider  Leonidas Swann MD as Consulting Physician             [facility]   [address]  [phone]    2022     CHIEF COMPLAINT:     Daryn Blanton (:  1945) is a 68 y.o. male, here for evaluation of the following chief complaint(s):  Discuss Labs      REVIEWED INFORMATION      No Known Allergies    Current Outpatient Medications   Medication Sig Dispense Refill    metoprolol succinate (TOPROL XL) 100 MG extended release tablet Take 1 tablet by mouth nightly 90 tablet 1    thiamine (GNP VITAMIN B-1) 100 MG tablet Take 1 tablet by mouth daily 90 tablet 1    furosemide (LASIX) 20 MG tablet Take 1 tablet by mouth daily 90 tablet 1    folic acid (FOLVITE) 1 MG tablet Take 1 tablet by mouth daily 90 tablet 1    rosuvastatin (CRESTOR) 20 MG tablet Take 1 tablet by mouth daily 90 tablet 1    fenofibrate (TRIGLIDE) 160 MG tablet Take 1 tablet by mouth daily 90 tablet 1    amLODIPine (NORVASC) 5 MG tablet Take 1 tablet by mouth daily 90 tablet 1    magnesium oxide (MAG-OX) 400 MG tablet Take 400 mg by mouth daily      escitalopram (LEXAPRO) 20 MG tablet Take 1 tablet by mouth daily 90 tablet 1    vitamin B-12 (CYANOCOBALAMIN) 500 MCG tablet Take 500 mcg by mouth daily      levothyroxine (SYNTHROID) 150 MCG tablet take 1 tablet by mouth once daily      levothyroxine (SYNTHROID) 125 MCG tablet Take 1 tablet by mouth daily (Patient not taking: Reported on 2022) 30 tablet 0     No current facility-administered medications for this visit. Patient Care Team:  ANDERSON Waggoner CNP as PCP - General (Nurse Practitioner)  ANDERSON Waggoner CNP as PCP - [facility]  Shoaib Goodson MD as Consulting Physician    REVIEW OF SYSTEMS:     Review of Systems   Constitutional: Positive for activity change and fatigue. Negative for unexpected weight change. HENT: Negative for congestion, ear pain, hearing loss, rhinorrhea and sore throat.     Respiratory: Negative for cough and shortness of breath. Cardiovascular: Negative for chest pain, palpitations and leg swelling. Gastrointestinal: Negative for constipation and diarrhea. Musculoskeletal: Negative for arthralgias and gait problem. Foot pain     Neurological: Negative for dizziness, weakness and headaches. Psychiatric/Behavioral: Positive for dysphoric mood. Negative for confusion. The patient is not nervous/anxious. HISTORY OF PRESENT ILLNESS       Patient returns today for checkup. Patient has not been seen since May for f/u. He has been treated and monitored by cardiology, endocrinology, and evaluated by nephrology due Afib, acute onset CHF, and medication mediated hypothyroidism. Patient has a long standing history of hypertension, hyperlipidemia, and depression worsened by alcohol abuse. We discussed his usage in office today, he admits to daily usage, is not willing to stop. Hypertension is well controlled with medication he is maintained on Metoprolol, amlodipine, and lasix. Labs ordered to evaluate kidney function. Afib is well controlled with BB and CCB with use of eliquis as anticoagulation. Denies use of daily aspirin. Followed by Cardiology at [facility]    Depression is denied - continues with Lexapro. Denies any exacerbation, although I am concerned over his increasing activity. However lifestyle, and continued use of alcohol continue to be playing factors in overall psychological state. Patient does engage in conversation and is jovial when talking about family, his daughters, and friends. Hyperlipidemia is normal - labs last checked in Fall of 2021 were stable. He continues fenofibrate and Crestor. Denies any side effects. I do have concern over patient compliance with medication. He has family in to help sort and monitor, however admittedly does not take medication daily as he should. Reports that he often forgets.  Patient advised that drinking and inconsistent medication adminsitration put him at significantly higher risk for cardiovascular event or stroke. He is aware, and report he will do  Better. Patient did have new onset complaint of bilateral burning foot pain, - reports that it has been ongoing for some time. He reports he using and otc cream that helps, but pain is worse at night. Patient does appear by exam to have evidence of neuropathy and reduce sensation of his feet bilaterally. I do not feel he would benefit from oral gabapentin at this time, we discussed a neurotin cream, that may be ordered via compound pharmacy pending price to see if this will help. - we also discussed foot exercises to help with muscle control and nerve changes. .     Will discuss coumopound agent with pharmacy  For gabapentin, lidocaine, and ketophen to see if this will help better control his night time discomfort without giving him and medication systemically that will worsen his balance and cognition. PHYSICAL EXAM:     /80   Pulse 78   Temp 97.4 °F (36.3 °C) (Tympanic)   Ht 5' 11\" (1.803 m)   Wt 267 lb (121.1 kg)   SpO2 98%   BMI 37.24 kg/m²      Physical Exam  Vitals reviewed. Constitutional:       Appearance: He is obese. He is not ill-appearing. HENT:      Head: Normocephalic and atraumatic. Eyes:      Extraocular Movements: Extraocular movements intact. Pupils: Pupils are equal, round, and reactive to light. Neck:      Vascular: No carotid bruit. Cardiovascular:      Rate and Rhythm: Normal rate and regular rhythm. Pulses: Normal pulses. Heart sounds: Normal heart sounds. Pulmonary:      Effort: Pulmonary effort is normal.      Breath sounds: Normal breath sounds. Abdominal:      General: Abdomen is flat. There is no distension. Palpations: Abdomen is soft. There is no mass. Tenderness: There is no abdominal tenderness. There is no right CVA tenderness or left CVA tenderness. Musculoskeletal:         General: No swelling or tenderness. Normal range of motion. Cervical back: Normal range of motion and neck supple. Skin:     General: Skin is warm. Capillary Refill: Capillary refill takes less than 2 seconds. Findings: No erythema or rash. Neurological:      General: No focal deficit present. Mental Status: He is alert and oriented to person, place, and time. Psychiatric:         Mood and Affect: Mood normal.         Behavior: Behavior normal.          PROCEDURE/ IN OFFICE TESTING/ LAB REVIEW     No in office testing or procedures completed during today's office visit. ASSESSMENT/PLAN/ FOLLOWUP:     PLEASE NOTE THAT ANY DISCONTINUATION OF MEDICATIONS OR MEDICAL SUPPLIES REFLECTED IN TODAY'S VISIT SUMMARY  MAY NOT HAVE COMPLETED AS A CHANGE IN YOUR PLAN OF CARE. THESE CHANGES MAY HAVE ONLY BEEN DONE SO IN ORDER TO CLEAN UP LIST FROM DUPLICATIONS OR MISCELLANEOUS SUPPLIES ONLY NEEDED PERIODIC REORDERS. DO NOT DISCONTINUE MEDICATIONS LISTED UNLESS SPECIFICALLY DISCUSSED IN YOUR APPOINTMENT WITH PROVIDER OR SPECIALIST, IF YOU HAVE AN QUESTIONS, PLEASE CONTACT YOUR PROVIDER FOR CLARIFICATION IF NOT ADDRESSED IN YOUR PLAN OF CARE. 1. Medicare annual wellness visit, subsequent  2. Need for pneumococcal vaccination  -     Pneumococcal polysaccharide vaccine 23-valent greater than or equal to 3yo subcutaneous/IM  3. Need for tetanus, diphtheria, and acellular pertussis (Tdap) vaccine  -     Tetanus-Diphth-Acell Pertussis (BOOSTRIX) 5-2.5-18.5 LF-MCG/0.5 injection; Inject 0.5 mLs into the muscle once for 1 dose, Disp-1 each, R-0Print  4. Severe obesity (BMI 35.0-39. 9) with comorbidity ([facility]  5. Need for prophylactic vaccination against diphtheria-tetanus-pertussis (DTP)  6. Chronic systolic congestive heart failure, NYHA class 3 ([facility]  7. Longstanding persistent atrial fibrillation ([address]  8.  Other polyneuropathy  Comments:  bilateral foot pain/burning sensation  Discussed Gabapentin Cream from compound pharmacy. 9. Essential hypertension  10. Hyperlipidemia, unspecified hyperlipidemia type      Return in 6 months (on 8/23/2022) for Medicare Annual Wellness Visit in 1 year, Blood pressure check/ hypertension. COMMUNICATION:             The best way to find yourself is to lose yourself in the service of others - [address] [place] [address]. [facility]   [email or web address]  Office: [phone]     An electronic signature was used to authenticate this note.   Signed by ANDERSON Rodriguez CNP, APRN-CNP on 3/9/2022 at 12:51 PM

## 2022-02-23 NOTE — PATIENT INSTRUCTIONS
Personalized Preventive Plan for Rojelio Lima - 2/23/2022  Medicare offers a range of preventive health benefits. Some of the tests and screenings are paid in full while other may be subject to a deductible, co-insurance, and/or copay. Some of these benefits include a comprehensive review of your medical history including lifestyle, illnesses that may run in your family, and various assessments and screenings as appropriate. After reviewing your medical record and screening and assessments performed today your provider may have ordered immunizations, labs, imaging, and/or referrals for you. A list of these orders (if applicable) as well as your Preventive Care list are included within your After Visit Summary for your review. Other Preventive Recommendations:    · A preventive eye exam performed by an eye specialist is recommended every 1-2 years to screen for glaucoma; cataracts, macular degeneration, and other eye disorders. · A preventive dental visit is recommended every 6 months. · Try to get at least 150 minutes of exercise per week or 10,000 steps per day on a pedometer . · Order or download the FREE \"Exercise & Physical Activity: Your Everyday Guide\" from [employer]. Call 9-412.194.5601 or search [employer] online. · You need 0909-8621 mg of calcium and 7615-9019 IU of vitamin D per day. It is possible to meet your calcium requirement with diet alone, but a vitamin D supplement is usually necessary to meet this goal.  · When exposed to the sun, use a sunscreen that protects against both UVA and UVB radiation with an SPF of 30 or greater. Reapply every 2 to 3 hours or after sweating, drying off with a towel, or swimming. · Always wear a seat belt when traveling in a car. Always wear a helmet when riding a bicycle or motorcycle. Heart-Healthy Diet   Sodium, Fat, and Cholesterol Controlled Diet       What Is a Heart Healthy Diet?    A heart-healthy diet is one that limits sodium , certain types of fat , and cholesterol . This type of diet is recommended for:   People with any form of cardiovascular disease (eg, coronary heart disease , peripheral vascular disease , previous heart attack , previous stroke )   People with risk factors for cardiovascular disease, such as high blood pressure , high cholesterol , or diabetes   Anyone who wants to lower their risk of developing cardiovascular disease   Sodium    Sodium is a mineral found in many foods. In general, most people consume much more sodium than they need. Diets high in sodium can increase blood pressure and lead to edema (water retention). On a heart-healthy diet, you should consume no more than 2,300 mg (milligrams) of sodium per dayabout the amount in one teaspoon of table salt. The foods highest in sodium include table salt (about 50% sodium), processed foods, convenience foods, and preserved foods. Cholesterol    Cholesterol is a fat-like, waxy substance in your blood. Our bodies make some cholesterol. It is also found in animal products, with the highest amounts in fatty meat, egg yolks, whole milk, cheese, shellfish, and organ meats. On a heart-healthy diet, you should limit your cholesterol intake to less than 200 mg per day. It is normal and important to have some cholesterol in your bloodstream. But too much cholesterol can cause plaque to build up within your arteries, which can eventually lead to a heart attack or stroke. The two types of cholesterol that are most commonly referred to are:   Low-density lipoprotein (LDL) cholesterol  Also known as bad cholesterol, this is the cholesterol that tends to build up along your arteries. Bad cholesterol levels are increased by eating fats that are saturated or hydrogenated. Optimal level of this cholesterol is less than 100. Over 130 starts to get risky for heart disease.    High-density lipoprotein (HDL) cholesterol  Also known as good cholesterol, this type of cholesterol actually carries cholesterol away from your arteries and may, therefore, help lower your risk of having a heart attack. You want this level to be high (ideally greater than 60). It is a risk to have a level less than 40. You can raise this good cholesterol by eating olive oil, canola oil, avocados, or nuts. Exercise raises this level, too. Fat    Fat is calorie dense and packs a lot of calories into a small amount of food. Even though fats should be limited due to their high calorie content, not all fats are bad. In fact, some fats are quite healthful. Fat can be broken down into four main types. The good-for-you fats are:   Monounsaturated fat  found in oils such as olive and canola, avocados, and nuts and natural nut butters; can decrease cholesterol levels, while keeping levels of HDL cholesterol high   Polyunsaturated fat  found in oils such as safflower, sunflower, soybean, corn, and sesame; can decrease total cholesterol and LDL cholesterol   Omega-3 fatty acids  particularly those found in fatty fish (such as salmon, trout, tuna, mackerel, herring, and sardines); can decrease risk of arrhythmias, decrease triglyceride levels, and slightly lower blood pressure   The fats that you want to limit are:   Saturated fat  found in animal products, many fast foods, and a few vegetables; increases total blood cholesterol, including LDL levels   Animal fats that are saturated include: butter, lard, whole-milk dairy products, meat fat, and poultry skin   Vegetable fats that are saturated include: hydrogenated shortening, palm oil, coconut oil, cocoa butter   Hydrogenated or trans fat  found in margarine and vegetable shortening, most shelf stable snack foods, and fried foods; increases LDL and decreases HDL     It is generally recommended that you limit your total fat for the day to less than 30% of your total calories.  If you follow an 1800-calorie heart healthy diet, for example, this would mean 60 grams of fat or less per day. Saturated fat and trans fat in your diet raises your blood cholesterol the most, much more than dietary cholesterol does. For this reason, on a heart-healthy diet, less than 7% of your calories should come from saturated fat and ideally 0% from trans fat. On an 1800-calorie diet, this translates into less than 14 grams of saturated fat per day, leaving 46 grams of fat to come from mono- and polyunsaturated fats.    Food Choices on a Heart Healthy Diet   Food Category   Foods Recommended   Foods to Avoid   Grains   Breads and rolls without salted tops Most dry and cooked cereals Unsalted crackers and breadsticks Low-sodium or homemade breadcrumbs or stuffing All rice and pastas   Breads, rolls, and crackers with salted tops High-fat baked goods (eg, muffins, donuts, pastries) Quick breads, self-rising flour, and biscuit mixes Regular bread crumbs Instant hot cereals Commercially prepared rice, pasta, or stuffing mixes   Vegetables   Most fresh, frozen, and low-sodium canned vegetables Low-sodium and salt-free vegetable juices Canned vegetables if unsalted or rinsed   Regular canned vegetables and juices, including sauerkraut and pickled vegetables Frozen vegetables with sauces Commercially prepared potato and vegetable mixes   Fruits   Most fresh, frozen, and canned fruits All fruit juices   Fruits processed with salt or sodium   Milk   Nonfat or low-fat (1%) milk Nonfat or low-fat yogurt Cottage cheese, low-fat ricotta, cheeses labeled as low-fat and low-sodium   Whole milk Reduced-fat (2%) milk Malted and chocolate milk Full fat yogurt Most cheeses (unless low-fat and low salt) Buttermilk (no more than 1 cup per week)   Meats and Beans   Lean cuts of fresh or frozen beef, veal, lamb, or pork (look for the word loin) Fresh or frozen poultry without the skin Fresh or frozen fish and some shellfish Egg whites and egg substitutes (Limit whole eggs to three per week) Tofu Nuts or seeds (unsalted, dry-roasted), low-sodium peanut butter Dried peas, beans, and lentils   Any smoked, cured, salted, or canned meat, fish, or poultry (including rockwell, chipped beef, cold cuts, hot dogs, sausages, sardines, and anchovies) Poultry skins Breaded and/or fried fish or meats Canned peas, beans, and lentils Salted nuts   Fats and Oils   Olive oil and canola oil Low-sodium, low-fat salad dressings and mayonnaise   Butter, margarine, coconut and palm oils, rockwell fat   Snacks, Sweets, and Condiments   Low-sodium or unsalted versions of broths, soups, soy sauce, and condiments Pepper, herbs, and spices; vinegar, lemon, or lime juice Low-fat frozen desserts (yogurt, sherbet, fruit bars) Sugar, cocoa powder, honey, syrup, jam, and preserves Low-fat, trans-fat free cookies, cakes, and pies Thomas and animal crackers, fig bars, cortes snaps   High-fat desserts Broth, soups, gravies, and sauces, made from instant mixes or other high-sodium ingredients Salted snack foods Canned olives Meat tenderizers, seasoning salt, and most flavored vinegars   Beverages   Low-sodium carbonated beverages Tea and coffee in moderation Soy milk   Commercially softened water   Suggestions   Make whole grains, fruits, and vegetables the base of your diet. Choose heart-healthy fats such as canola, olive, and flaxseed oil, and foods high in heart-healthy fats, such as nuts, seeds, soybeans, tofu, and fish. Eat fish at least twice per week; the fish highest in omega-3 fatty acids and lowest in mercury include salmon, herring, mackerel, sardines, and canned chunk light tuna. If you eat fish less than twice per week or have high triglycerides, talk to your doctor about taking fish oil supplements. Read food labels.    For products low in fat and cholesterol, look for fat free, low-fat, cholesterol free, saturated fat free, and trans fat freeAlso scan the Nutrition Facts Label, which lists saturated fat, trans fat, Smoking   Symptoms   It is common to deny an alcohol problem. Alcohol abuse can occur without physical dependence. Alcohol abuse symptoms include:   Repeated work, school, or home problems due to drinking   Risking physical safety   Recurring trouble with the law, often including drinking and driving   Continuing to drink despite alcohol-related difficulties   Symptoms of alcoholism include:   Craving a drink   Unable to stop or limit drinking   Needing greater amounts of alcohol to feel the same effect   Giving up activities in order to drink or recover from alcohol   Drinking that continues even when it causes or worsens health problems   Wanting to stop or reduce drinking, but not being able   Withdrawal symptoms if alcohol is stopped include:   Nausea   Sweating   Shaking   Anxiety   Increased blood pressure   Seizures ( delirium tremens [DTs])   The brain, nervous system, heart, liver, stomach, gastrointestinal tract, and pancreas can all be damaged by alcoholism. Some of the Organs Damaged in Alcohol Abuse        [address] [place] [address].   Diagnosis   Doctors ask a series of questions to assess possible alcohol-related problems, including:   Have you tried to reduce your drinking? Have you felt bad about drinking? Have you been annoyed by another person's criticism of your drinking? Do you drink in the morning to steady your nerves or cure a hangover? Do you have problems with a job, your family, or the law? Do you drive under the influence of alcohol? Blood tests may be done to:   Look at the size of your red blood cells and to check for a substance called carbohydrate-deficient transferrin   Check for alcohol-related liver disease and other health problems   Treatment   Treatment for alcohol abuse or dependence is aimed at teaching patients how to manage the disease. Most professionals believe that this means giving up alcohol completely and permanently.    The first and most important step is recognizing a problem exists. Successful treatment depends on your desire to change. Your doctor can help you withdraw from alcohol safely. This could require hospitalization in a detoxification center. They will carefully monitor you for side effects. You may need medication while you are undergoing detoxification. Treatments include:   Medications    Drugs can help relieve some of the symptoms of withdrawal and help prevent relapse. The doctor may prescribe medication to reduce cravings for alcohol. Medications used to treat alcoholism and to try to prevent drinking include:   Naltrexone (ReVia, Vivitrol)blocks the high that makes you crave alcohol   Disulfiram (Antabuse)makes you very sick if you drink alcohol   Acamprosate (Campral)reduces your craving for alcohol   A study showed that an anticonvulsant drug, topiramate (Topamax), may reduce alcohol dependence. Education and Counseling    Therapy helps you to recognize alcohol's dangers. Education raises awareness of underlying issues and lifestyles that promote drinking. In therapy, you work to improve coping skills and learn other ways of dealing with stress or pain. Mentoring and Community Help    Alcoholics Anonymous (AA) helps many people to stop drinking and stay sober. Members meet regularly and support each other. Your family members may also benefit from attending meetings of Al-Cathy. Living with an alcoholic can be a painful, stressful situation. Here are some general statistics on treatment outcomes of individuals one year after attempting to stop drinkin/3 remained abstinent   1/3 resumed drinking but at a lower level   1/3 relapsed completely   If you are diagnosed with alcohol abuse or alcoholism, follow your doctor's instructions . Prevention   Realizing that alcohol causes problems helps some people avoid it. Suggestions to decrease the risk of alcohol abuse and dependence include:   Socialize without alcohol. Avoid going to bars. Do not keep alcohol in your home. Avoid situations and people that encourage drinking. Make new nondrinking friends. Do fun things that do not involve alcohol. Avoid reaching for a drink when stressed or upset. Limit your alcohol intake to a moderate level. Moderate is two or fewer drinks per day for men and one or fewer for women and older adults   A 12-ounce bottle of beer, a five-ounce glass of wine, or 1.5 ounces of liquor is considered one drink   If you are a parent, having a good relationship with your children may reduce their risk of alcohol abuse. Most professionals who treat alcohol abuse and dependence believe that complete abstinence is the only effective prevention. Last Reviewed: September 2010 Clotilde Medina MD, PhD, MPH   Updated: 9/20/2010   ·     Keep Your Memory Orie Gerlach       Many factors can affect your ability to remembera hectic lifestyle, aging, stress, chronic disease, and certain medicines. But, there are steps you can take to sharpen your mind and help preserve your memory. Challenge Your Brain   Regularly challenging your mind may help keeps it in top shape. Good mental exercises include:   Crossword puzzlesUse a dictionary if you need it; you will learn more that way. Brainteasers Try some! Crafts, such as wood working and sewing   Hobbies, such as gardening and building model airplanes   SocializingVisit old friends or join groups to meet new ones. Reading   Learning a new language   Taking a class, whether it be art history or sarah chi   TravelingExperience the food, history, and culture of your destination   Learning to use a computer   Going to museums, the theater, or thought-provoking movies   Changing things in your daily life, such as reversing your pattern in the grocery store or brushing your teeth using your nondominant hand   Use Memory Aids   There is no need to remember every detail on your own.  These memory aids can help: Calendars and day planners   Electronic organizers to store all sorts of helpful informationThese devices can \"beep\" to remind you of appointments. A book of days to record birthdays, anniversaries, and other occasions that occur on the same date every year   Detailed \"to-do\" lists and strategically placed sticky notes   Quick \"study\" sessionsBefore a gathering, review who will be there so their names will be fresh in your mind. Establish routinesFor example, keep your keys, wallet, and umbrella in the same place all the time or take medicine with your 8:00 AM glass of juice   Live a Healthy Life   Many actions that will keep your body strong will do the same for your mind. For example:   Talk to Your Doctor About Herbs and Supplements    Malnutrition and vitamin deficiencies can impair your mental function. For example, vitamin B12 deficiency can cause a range of symptoms, including confusion. But, what if your nutritional needs are being met? Can herbs and supplements still offer a benefit? Researchers have investigated a range of natural remedies, such as ginkgo , ginseng , and the supplement phosphatidylserine (PS). So far, though, the evidence is inconsistent as to whether these products can improve memory or thinking. If you are interested in taking herbs and supplements, talk to your doctor first because they may interact with other medicines that you are taking. Exercise Regularly    Among the many benefits of regular exercise are increased blood flow to the brain and decreased risk of certain diseases that can interfere with memory function. One study found that even moderate exercise has a beneficial effect. Examples of \"moderate\" exercise include:   Playing 18 holes of golf once a week, without a cart   Playing tennis twice a week   Walking one mile per day   Manage Stress    It can be tough to remember what is important when your mind is cluttered. Make time for relaxation.  Choose activities that calm you down, and make it routine. Manage Chronic Conditions    Side effects of high blood pressure , diabetes, and heart disease can interfere with mental function. Many of the lifestyle steps discussed here can help manage these conditions. Strive to eat a healthy diet, exercise regularly, get stress under control, and follow your doctor's advice for your condition. Minimize Medications    Talk to your doctor about the medicines that you take. Some may be unnecessary. Also, healthy lifestyle habits may lower the need for certain drugs.      Last Reviewed: April 2010 Chi Dillard MD   Updated: 4/13/2010   ·

## 2022-03-06 ASSESSMENT — ENCOUNTER SYMPTOMS
RHINORRHEA: 0
SHORTNESS OF BREATH: 0
COUGH: 0
DIARRHEA: 0
SORE THROAT: 0
CONSTIPATION: 0

## 2022-07-26 ENCOUNTER — PATIENT MESSAGE (OUTPATIENT)
Dept: FAMILY MEDICINE CLINIC | Age: 77
End: 2022-07-26

## 2022-07-26 DIAGNOSIS — Z79.01 LONG TERM (CURRENT) USE OF ANTICOAGULANTS: ICD-10-CM

## 2022-07-26 DIAGNOSIS — E83.42 HYPOMAGNESEMIA: ICD-10-CM

## 2022-07-26 DIAGNOSIS — D53.9 MACROCYTIC ANEMIA: Primary | ICD-10-CM

## 2022-07-26 DIAGNOSIS — I48.11 LONGSTANDING PERSISTENT ATRIAL FIBRILLATION (HCC): ICD-10-CM

## 2022-07-26 RX ORDER — CHOLECALCIFEROL (VITAMIN D3) 125 MCG
500 CAPSULE ORAL DAILY
Qty: 90 TABLET | Refills: 1 | Status: SHIPPED | OUTPATIENT
Start: 2022-07-26 | End: 2022-07-28 | Stop reason: SDUPTHER

## 2022-07-26 RX ORDER — LANOLIN ALCOHOL/MO/W.PET/CERES
100 CREAM (GRAM) TOPICAL DAILY
Qty: 90 TABLET | Refills: 1 | Status: SHIPPED | OUTPATIENT
Start: 2022-07-26 | End: 2022-07-28 | Stop reason: SDUPTHER

## 2022-07-26 RX ORDER — MAGNESIUM OXIDE 400 MG/1
400 TABLET ORAL DAILY
Qty: 90 TABLET | Refills: 1 | Status: SHIPPED | OUTPATIENT
Start: 2022-07-26 | End: 2022-07-28 | Stop reason: SDUPTHER

## 2022-07-26 NOTE — TELEPHONE ENCOUNTER
From: Genesis Patton  To: Rajesh Wolf  Sent: 7/26/2022 4:16 PM EDT  Subject: Varun Bell afternoon Dr Manda Poe  Father needs a few refills  A) Eliquus  B) B12 - hear you get better quality vitamins by prescription   C) Magnesium  D) Thiamine    Can we request the smallest pills available. I believe it would change his excitement for taking them. I believe he would take more often without horse pills    He is will be getting blood drawn for Thyroid Dr this week. We nred to sched with your office after Aug 15th 1:30-2:00   If you want to add blood draw prescription we can wait until you add to the order.   Thank you much

## 2022-07-26 NOTE — TELEPHONE ENCOUNTER
LOV 2/23/22  LRF 1/28/21 Eliquis, Mag 11/10/20, B 12 No Hx, B1 10/25/21    Health Maintenance   Topic Date Due    DTaP/Tdap/Td vaccine (1 - Tdap) Never done    COVID-19 Vaccine (4 - Booster for Moderna series) 05/18/2022    Shingles vaccine (1 of 2) 02/23/2023 (Originally 2/10/1995)    Flu vaccine (1) 09/01/2022    Lipids  10/01/2022    Depression Monitoring  02/23/2023    Pneumococcal 65+ years Vaccine (2 - PCV) 02/23/2023    Annual Wellness Visit (AWV)  02/24/2023    Hepatitis A vaccine  Aged Out    Hepatitis B vaccine  Aged Out    Hib vaccine  Aged Out    Meningococcal (ACWY) vaccine  Aged Out    Hepatitis C screen  Discontinued             (applicable per patient's age: Cancer Screenings, Depression Screening, Fall Risk Screening, Immunizations)    LDL Cholesterol (mg/dL)   Date Value   06/22/2020 53     LDL Calculated (mg/dL)   Date Value   10/01/2021 37     AST (U/L)   Date Value   10/01/2021 20     ALT (U/L)   Date Value   10/01/2021 7     BUN (mg/dL)   Date Value   10/01/2021 18      (goal A1C is < 7)   (goal LDL is <100) need 30-50% reduction from baseline     BP Readings from Last 3 Encounters:   02/23/22 138/80   05/11/21 130/68   08/31/20 132/84    (goal /80)      All Future Testing planned in [facility]:  Lab Frequency Next Occurrence       Next Visit Date:  Future Appointments   Date Time Provider Waqas Mcknight   8/23/2022 11:30 AM ANDERSON Duran - [facility]            Patient Active Problem List:     Hyperlipidemia     Essential hypertension     Status post total hip replacement, left     Encounter for long-term (current) use of medications     Alcohol abuse     Depression     PAF (paroxysmal atrial fibrillation) ([facility] [address] [facility]     Status post catheter ablation of atrial fibrillation     Long term current use of antiarrhythmic drug     Long term (current) use of anticoagulants     Macrocytic anemia     Longstanding persistent atrial fibrillation (HCC)     Chronic systolic [facility]ive [facility], [facility] class 3 [facility]

## 2022-07-28 ENCOUNTER — PATIENT MESSAGE (OUTPATIENT)
Dept: FAMILY MEDICINE CLINIC | Age: 77
End: 2022-07-28

## 2022-07-28 ENCOUNTER — TELEPHONE (OUTPATIENT)
Dept: FAMILY MEDICINE CLINIC | Age: 77
End: 2022-07-28

## 2022-07-28 DIAGNOSIS — F34.1 DYSTHYMIA: ICD-10-CM

## 2022-07-28 DIAGNOSIS — I48.11 LONGSTANDING PERSISTENT ATRIAL FIBRILLATION (HCC): ICD-10-CM

## 2022-07-28 DIAGNOSIS — I50.22 CHRONIC SYSTOLIC CONGESTIVE HEART FAILURE, NYHA CLASS 3 (HCC): ICD-10-CM

## 2022-07-28 DIAGNOSIS — E83.42 HYPOMAGNESEMIA: ICD-10-CM

## 2022-07-28 DIAGNOSIS — E78.5 HYPERLIPIDEMIA, UNSPECIFIED HYPERLIPIDEMIA TYPE: ICD-10-CM

## 2022-07-28 DIAGNOSIS — Z79.01 LONG TERM (CURRENT) USE OF ANTICOAGULANTS: ICD-10-CM

## 2022-07-28 RX ORDER — MAGNESIUM OXIDE 400 MG/1
400 TABLET ORAL DAILY
Qty: 90 TABLET | Refills: 1 | Status: SHIPPED | OUTPATIENT
Start: 2022-07-28 | End: 2023-01-24

## 2022-07-28 RX ORDER — ESCITALOPRAM OXALATE 20 MG/1
20 TABLET ORAL DAILY
Qty: 90 TABLET | Refills: 1 | Status: SHIPPED | OUTPATIENT
Start: 2022-07-28 | End: 2023-01-24

## 2022-07-28 RX ORDER — CHOLECALCIFEROL (VITAMIN D3) 125 MCG
500 CAPSULE ORAL DAILY
Qty: 90 TABLET | Refills: 1 | Status: SHIPPED | OUTPATIENT
Start: 2022-07-28 | End: 2023-01-24

## 2022-07-28 RX ORDER — AMLODIPINE BESYLATE 5 MG/1
5 TABLET ORAL DAILY
Qty: 90 TABLET | Refills: 1 | Status: SHIPPED | OUTPATIENT
Start: 2022-07-28 | End: 2023-01-24

## 2022-07-28 RX ORDER — METOPROLOL SUCCINATE 100 MG/1
100 TABLET, EXTENDED RELEASE ORAL NIGHTLY
Qty: 90 TABLET | Refills: 1 | Status: SHIPPED | OUTPATIENT
Start: 2022-07-28 | End: 2023-01-24

## 2022-07-28 RX ORDER — FUROSEMIDE 20 MG/1
20 TABLET ORAL DAILY
Qty: 90 TABLET | Refills: 1 | Status: SHIPPED | OUTPATIENT
Start: 2022-07-28 | End: 2023-01-24

## 2022-07-28 RX ORDER — ROSUVASTATIN CALCIUM 20 MG/1
20 TABLET, COATED ORAL DAILY
Qty: 90 TABLET | Refills: 1 | Status: SHIPPED | OUTPATIENT
Start: 2022-07-28 | End: 2023-01-24

## 2022-07-28 RX ORDER — FOLIC ACID 1 MG/1
1 TABLET ORAL DAILY
Qty: 90 TABLET | Refills: 1 | Status: SHIPPED | OUTPATIENT
Start: 2022-07-28 | End: 2023-01-24

## 2022-07-28 RX ORDER — FENOFIBRATE 160 MG/1
160 TABLET ORAL DAILY
Qty: 90 TABLET | Refills: 1 | Status: SHIPPED | OUTPATIENT
Start: 2022-07-28 | End: 2023-01-24

## 2022-07-28 RX ORDER — LANOLIN ALCOHOL/MO/W.PET/CERES
100 CREAM (GRAM) TOPICAL DAILY
Qty: 90 TABLET | Refills: 1 | Status: SHIPPED | OUTPATIENT
Start: 2022-07-28 | End: 2023-01-24

## 2022-07-28 NOTE — TELEPHONE ENCOUNTER
Patient requesting routine labs prior to 8/23/22 visit. Please advise if appropriate.      Fax orders to [facility] lab [phone]

## 2022-08-05 DIAGNOSIS — I10 ESSENTIAL HYPERTENSION: Primary | ICD-10-CM

## 2022-08-18 ENCOUNTER — PATIENT MESSAGE (OUTPATIENT)
Dept: FAMILY MEDICINE CLINIC | Age: 77
End: 2022-08-18

## 2022-08-18 NOTE — TELEPHONE ENCOUNTER
From: Arnaud Amaro  To: Libertad Alcala  Sent: 8/18/2022 10:20 AM EDT  Subject: Appointment / Blood Test    I reviewed my numbers on line for the blood test and everything is consistent with previous nomal readings. In fact I had blood drawn for Thyroid and I once had a number off 114 and last test was 3.4 a dramatic improvement. I am working on getting a reduced med from new thyroid DrMatty / previous DrMatty has moved to [facility]. This to me is a maintenance issue and need a reduced script. Is that something Dayna Marie can do? I was taking Levothyroxine 175 - I have 3 refills but think the dosage should be reduced as we have made great progress. Based upon all this I would like to postpone for now and cancel Aug 23 appointment. Need advise on changeing dosage.

## 2022-08-30 ENCOUNTER — TELEMEDICINE (OUTPATIENT)
Dept: FAMILY MEDICINE CLINIC | Age: 77
End: 2022-08-30
Payer: MEDICARE

## 2022-08-30 DIAGNOSIS — I10 ESSENTIAL HYPERTENSION: ICD-10-CM

## 2022-08-30 DIAGNOSIS — E03.2 HYPOTHYROIDISM DUE TO MEDICATION: Primary | ICD-10-CM

## 2022-08-30 DIAGNOSIS — I48.0 PAF (PAROXYSMAL ATRIAL FIBRILLATION) (HCC): ICD-10-CM

## 2022-08-30 PROCEDURE — G8428 CUR MEDS NOT DOCUMENT: HCPCS | Performed by: NURSE PRACTITIONER

## 2022-08-30 PROCEDURE — 99214 OFFICE O/P EST MOD 30 MIN: CPT | Performed by: NURSE PRACTITIONER

## 2022-08-30 PROCEDURE — 1123F ACP DISCUSS/DSCN MKR DOCD: CPT | Performed by: NURSE PRACTITIONER

## 2022-08-30 RX ORDER — LEVOTHYROXINE SODIUM 0.15 MG/1
150 TABLET ORAL DAILY
Qty: 30 TABLET | Refills: 1 | Status: SHIPPED | OUTPATIENT
Start: 2022-08-30 | End: 2022-09-29

## 2022-08-30 ASSESSMENT — ENCOUNTER SYMPTOMS
CONSTIPATION: 0
COUGH: 0
RHINORRHEA: 0
DIARRHEA: 0
SHORTNESS OF BREATH: 0
SORE THROAT: 0

## 2022-08-30 ASSESSMENT — ANXIETY QUESTIONNAIRES
5. BEING SO RESTLESS THAT IT IS HARD TO SIT STILL: 0
3. WORRYING TOO MUCH ABOUT DIFFERENT THINGS: 0
IF YOU CHECKED OFF ANY PROBLEMS ON THIS QUESTIONNAIRE, HOW DIFFICULT HAVE THESE PROBLEMS MADE IT FOR YOU TO DO YOUR WORK, TAKE CARE OF THINGS AT HOME, OR GET ALONG WITH OTHER PEOPLE: NOT DIFFICULT AT ALL
GAD7 TOTAL SCORE: 0
7. FEELING AFRAID AS IF SOMETHING AWFUL MIGHT HAPPEN: 0
2. NOT BEING ABLE TO STOP OR CONTROL WORRYING: 0
6. BECOMING EASILY ANNOYED OR IRRITABLE: 0
1. FEELING NERVOUS, ANXIOUS, OR ON EDGE: 0
4. TROUBLE RELAXING: 0

## 2022-08-30 ASSESSMENT — PATIENT HEALTH QUESTIONNAIRE - PHQ9
SUM OF ALL RESPONSES TO PHQ QUESTIONS 1-9: 0
8. MOVING OR SPEAKING SO SLOWLY THAT OTHER PEOPLE COULD HAVE NOTICED. OR THE OPPOSITE, BEING SO FIGETY OR RESTLESS THAT YOU HAVE BEEN MOVING AROUND A LOT MORE THAN USUAL: 0
7. TROUBLE CONCENTRATING ON THINGS, SUCH AS READING THE NEWSPAPER OR WATCHING TELEVISION: 0
3. TROUBLE FALLING OR STAYING ASLEEP: 0
5. POOR APPETITE OR OVEREATING: 0
1. LITTLE INTEREST OR PLEASURE IN DOING THINGS: 0
SUM OF ALL RESPONSES TO PHQ9 QUESTIONS 1 & 2: 0
10. IF YOU CHECKED OFF ANY PROBLEMS, HOW DIFFICULT HAVE THESE PROBLEMS MADE IT FOR YOU TO DO YOUR WORK, TAKE CARE OF THINGS AT HOME, OR GET ALONG WITH OTHER PEOPLE: 0
2. FEELING DOWN, DEPRESSED OR HOPELESS: 0
SUM OF ALL RESPONSES TO PHQ QUESTIONS 1-9: 0
9. THOUGHTS THAT YOU WOULD BE BETTER OFF DEAD, OR OF HURTING YOURSELF: 0
6. FEELING BAD ABOUT YOURSELF - OR THAT YOU ARE A FAILURE OR HAVE LET YOURSELF OR YOUR FAMILY DOWN: 0
4. FEELING TIRED OR HAVING LITTLE ENERGY: 0

## 2022-08-30 NOTE — PROGRESS NOTES
Jensen Landers (:  1945) is a Established patient, here for evaluation of the following:        Assessment & Plan   Below is the assessment and plan developed based on review of pertinent history, physical exam, labs, studies, and medications. 1. Hypothyroidism due to medication  -     levothyroxine (SYNTHROID) 150 MCG tablet; Take 1 tablet by mouth Daily, Disp-30 tablet, R-1CANCEL SCRIPTS  MCG, MEDICATION HAS BEEN REDUCED. Normal  -     TSH; Standing  2. PAF (paroxysmal atrial fibrillation) ([facility] [address]  3. Essential hypertension  No follow-ups on file. Subjective   Patient overall is doing well. His thyroid has been well maintained in the last month. Noting that he had and it improved thyroid-stimulating hormone down to 3.06. This is previously been monitored since his exacerbation after A. fib by endocrinology. Due to change over with physicians and appointments. Patient is requesting now that he is controlled that I take over medication as needed. He reports that he was continued on the 175 mics. However ran out of the medication, he had 150 mics at home due to the extreme reduction in his thyroid medication we will go ahead and continue him on the 150 have him retest in 6 weeks if it is elevated at that time we will go back to 175 mics and we will continue to monitor. Patient reports that he has been monitoring his A. fib he has not had any exacerbations reports that he has been running in a sinus rhythm at this point denies any leg swelling excessive, any extreme shortness of breath, and fatigue is somewhat normal for 77. Patient continues to drink, and lives the same lifestyle he has for several years. He is currently not excessively active, not doing any exercises or physical therapy.   Patient's blood pressure is well controlled, he denies any chest pain, shortness of breath,            BLOOD PRESSURE 128/74  AFIB CONTROLLED CONTINUES TO FOLLOW WITH [facility]          Review of Systems Constitutional:  Negative for activity change, fatigue and unexpected weight change. HENT:  Negative for congestion, ear pain, hearing loss, rhinorrhea and sore throat. Respiratory:  Negative for cough and shortness of breath. Cardiovascular:  Negative for chest pain, palpitations and leg swelling. Gastrointestinal:  Negative for constipation and diarrhea. Musculoskeletal:  Negative for arthralgias and gait problem. Neurological:  Negative for dizziness, weakness and headaches. Psychiatric/Behavioral:  Negative for confusion. The patient is not nervous/anxious. Objective   Patient-Reported Vitals  No data recorded     Physical Exam  Constitutional:       Appearance: Normal appearance. He is well-developed. He is not ill-appearing. Eyes:      General:         Right eye: No discharge. Left eye: No discharge. Extraocular Movements: Extraocular movements intact. Conjunctiva/sclera: Conjunctivae normal.   Neck:      Thyroid: No thyroid mass. Vascular: No JVD. Pulmonary:      Effort: Pulmonary effort is normal. No respiratory distress. Musculoskeletal:      Right shoulder: No deformity. Normal range of motion. Left shoulder: No deformity. Normal range of motion. Cervical back: Normal range of motion. Skin:     General: Skin is moist.      Coloration: Skin is not cyanotic or jaundiced. Findings: No rash. Neurological:      General: No focal deficit present. Mental Status: He is alert and oriented to person, place, and time. Gait: Gait is intact. Psychiatric:         Attention and Perception: Attention normal. He does not perceive auditory or visual hallucinations.          Mood and Affect: Mood normal.         Speech: Speech normal.            On this date 8/30/2022 I have spent 30 minutes reviewing previous notes, test results and face to face (virtual) with the patient discussing the diagnosis and importance of compliance with the treatment plan as well as documenting on the day of the visit. Justine House, was evaluated through a synchronous (real-time) audio-video encounter. The patient (or guardian if applicable) is aware that this is a billable service, which includes applicable co-pays. This Virtual Visit was conducted with patient's (and/or legal guardian's) consent. The visit was conducted pursuant to the emergency declaration under the [facility] [place], [facility] authority and the [employer] Act. Patient identification was verified, and a caregiver was present when appropriate. The patient was located at Home: [address] [place] [address] 36.. Provider was located at [facility] [employer] ([address] [place], [place] [address]t): [facility],  [facility].         --ANDERSON Hardin - CNP

## 2022-12-08 ENCOUNTER — PATIENT MESSAGE (OUTPATIENT)
Dept: FAMILY MEDICINE CLINIC | Age: 77
End: 2022-12-08

## 2022-12-08 DIAGNOSIS — E03.2 HYPOTHYROIDISM DUE TO MEDICATION: ICD-10-CM

## 2022-12-08 NOTE — TELEPHONE ENCOUNTER
From: Thelma Fuller  To: Dwayne Milagrosgael  Sent: 12/8/2022 10:29 AM EST  Subject: Daniel Keys    Good Morning    Father needs a refill on Thyroid med. The last test showed normal levels. He is getting a blood draw next week for [facility] . If you want to add a order to check Thyroid let us know. .   he is currently in 150 mg. I know this is a high #    He had 2 visits to the hospital due to fluid around the lungs and heart. He now has a visit to follow up with [facility] . He had an X-ray this week and they are checking to make sure it isnt building up again. I was filling all meds and realized we have been filling Vitamin D over the counter . I have been filling 1000 units. I should be using 5000. . should I have you fill D with one pill 5000.     Thank you for your help

## 2022-12-12 RX ORDER — LEVOTHYROXINE SODIUM 0.15 MG/1
150 TABLET ORAL DAILY
Qty: 30 TABLET | Refills: 5 | Status: SHIPPED | OUTPATIENT
Start: 2022-12-12 | End: 2023-12-12

## 2022-12-24 ENCOUNTER — PATIENT MESSAGE (OUTPATIENT)
Dept: FAMILY MEDICINE CLINIC | Age: 77
End: 2022-12-24

## 2022-12-24 DIAGNOSIS — E03.2 HYPOTHYROIDISM DUE TO MEDICATION: ICD-10-CM

## 2022-12-28 NOTE — TELEPHONE ENCOUNTER
From: Monalisa Rodriguez  To: Chongrashel Mike  Sent: 12/24/2022 8:59 PM EST  Subject: Thyroid Meds 150 mg    I see my last reading was just over 5.0 just a little out of range. I am out of the meds and have no refills - please advise for [facility] - this has been a major improvement as before the 150 mg I was taking 175 mg. Also I got all my future open scripts for testing. Thank you and great new year for you and family.     Yvonne De Los Santos

## 2023-01-03 NOTE — TELEPHONE ENCOUNTER
Request for Levothyroxine. Last script sent: 12/22/22- patient had to switch pharmacies  Last OV: 8/30/22  Next Visit Date:   No future appointments.     Health Maintenance   Topic Date Due    DTaP/Tdap/Td vaccine (1 - Tdap) Never done    Flu vaccine (1) Never done    Lipids  10/01/2022    Shingles vaccine (1 of 2) 02/23/2023 (Originally 2/10/1995)    COVID-19 Vaccine (4 - Booster for Tushar Iroquois series) 08/30/2023 (Originally 3/15/2022)    Annual Wellness Visit (AWV)  02/24/2023    Depression Monitoring  08/30/2023    Pneumococcal 65+ years Vaccine  Completed    Hepatitis A vaccine  Aged Out    Hib vaccine  Aged Out    Meningococcal (ACWY) vaccine  Aged Out    Hepatitis C screen  Discontinued       No results found for: LABA1C          ( goal A1C is < 7)   No results found for: LABMICR  LDL Cholesterol (mg/dL)   Date Value   06/22/2020 53     LDL Calculated (mg/dL)   Date Value   10/01/2021 37       (goal LDL is <100)   AST (U/L)   Date Value   10/01/2021 20     ALT (U/L)   Date Value   10/01/2021 7     BUN (mg/dL)   Date Value   10/01/2021 18     BP Readings from Last 3 Encounters:   02/23/22 138/80   05/11/21 130/68   08/31/20 132/84          (goal 120/80)    All Future Testing planned in [facility]  Lab Frequency Next Occurrence   Basic Metabolic Panel Once 43/78/9830   CBC Once 08/06/2022   TSH Every 6 weeks 2/10/2023, 3/24/2023, 5/5/2023         Patient Active Problem List:     Hyperlipidemia     Essential hypertension     Status post total hip replacement, left     Encounter for long-term (current) use of medications     Alcohol abuse     Depression     PAF (paroxysmal atrial fibrillation) ([facility] [address]     Status post catheter ablation of atrial fibrillation     Long term current use of antiarrhythmic drug     Long term (current) use of anticoagulants     Macrocytic anemia     Longstanding persistent atrial fibrillation (HCC)     Chronic systolic congestive heart failure, NYHA class 3 ([address]

## 2023-01-04 RX ORDER — LEVOTHYROXINE SODIUM 0.15 MG/1
150 TABLET ORAL DAILY
Qty: 30 TABLET | Refills: 5 | Status: SHIPPED | OUTPATIENT
Start: 2023-01-04 | End: 2024-01-04

## 2023-03-21 DIAGNOSIS — E03.2 HYPOTHYROIDISM DUE TO MEDICATION: ICD-10-CM

## 2023-03-21 RX ORDER — LEVOTHYROXINE SODIUM 0.15 MG/1
150 TABLET ORAL DAILY
Qty: 90 TABLET | Refills: 3 | Status: SHIPPED | OUTPATIENT
Start: 2023-03-21 | End: 2024-03-20

## 2023-03-21 NOTE — TELEPHONE ENCOUNTER
Request for levothyroxine. Last script sent: Script needs sent to mail order pharmacy. Last OV: 8/30/22   Next Visit Date:  No future appointments.     Health Maintenance   Topic Date Due    DTaP/Tdap/Td vaccine (1 - Tdap) Never done    Shingles vaccine (1 of 2) Never done    Flu vaccine (1) Never done    Annual Wellness Visit (AWV)  02/24/2023    COVID-19 Vaccine (4 - Booster for Heidi Sharps series) 08/30/2023 (Originally 3/15/2022)    Depression Monitoring  08/30/2023    Pneumococcal 65+ years Vaccine  Completed    Hepatitis A vaccine  Aged Out    Hib vaccine  Aged Out    Meningococcal (ACWY) vaccine  Aged Out    Hepatitis C screen  Discontinued       No results found for: LABA1C          ( goal A1C is < 7)   No results found for: LABMICR  LDL Cholesterol (mg/dL)   Date Value   06/22/2020 53     LDL Calculated (mg/dL)   Date Value   10/01/2021 37       (goal LDL is <100)   AST (U/L)   Date Value   10/01/2021 20     ALT (U/L)   Date Value   10/01/2021 7     BUN (mg/dL)   Date Value   10/01/2021 18     BP Readings from Last 3 Encounters:   02/23/22 138/80   05/11/21 130/68   08/31/20 132/84          (goal 120/80)    All Future Testing planned in [facility]  Lab Frequency Next Occurrence   Basic Metabolic Panel Once 06/32/3605   CBC Once 08/06/2022   TSH Every 6 weeks 3/24/2023, 5/5/2023         Patient Active Problem List:     Hyperlipidemia     Essential hypertension     Status post total hip replacement, left     Encounter for long-term (current) use of medications     Alcohol abuse     Depression     PAF (paroxysmal atrial fibrillation) ([facility] [address]     Status post catheter ablation of atrial fibrillation     Long term current use of antiarrhythmic drug     Long term (current) use of anticoagulants     Macrocytic anemia     Longstanding persistent atrial fibrillation (HCC)     Chronic systolic congestive heart failure, NYHA class 3 ([address]

## 2023-05-30 NOTE — ED NOTES
Dr Malka Reinoso at bedside     Veda Speedy, [address] [place] [address]  06/21/20 8317 No Vaccines Administered.

## 2023-06-06 LAB — TSH SERPL DL<=0.05 MIU/L-ACNC: 4.33 UIU/ML

## 2023-06-08 DIAGNOSIS — E03.2 HYPOTHYROIDISM DUE TO MEDICATION: ICD-10-CM

## 2023-08-23 DIAGNOSIS — E83.42 HYPOMAGNESEMIA: ICD-10-CM

## 2023-08-23 DIAGNOSIS — E78.5 HYPERLIPIDEMIA, UNSPECIFIED HYPERLIPIDEMIA TYPE: ICD-10-CM

## 2023-08-23 NOTE — TELEPHONE ENCOUNTER
LOV: 8/30/2022      RTO: No future appointments. LM for the patient to call the office back to get an appointment scheduled. A [employer] message was also sent. LRF: 7/28/2022          Controlled Substance Monitoring:    Acute and Chronic Pain Monitoring:   No flowsheet data found.

## 2023-08-25 RX ORDER — METOPROLOL SUCCINATE 100 MG/1
100 TABLET, EXTENDED RELEASE ORAL NIGHTLY
Qty: 90 TABLET | Refills: 1 | Status: SHIPPED | OUTPATIENT
Start: 2023-08-25 | End: 2024-02-21

## 2023-08-25 RX ORDER — AMLODIPINE BESYLATE 5 MG/1
5 TABLET ORAL DAILY
Qty: 90 TABLET | Refills: 1 | Status: SHIPPED | OUTPATIENT
Start: 2023-08-25 | End: 2024-02-21

## 2023-08-25 RX ORDER — MAGNESIUM OXIDE 400 MG/1
400 TABLET ORAL DAILY
Qty: 90 TABLET | Refills: 1 | Status: SHIPPED | OUTPATIENT
Start: 2023-08-25 | End: 2024-02-21

## 2023-08-25 RX ORDER — ROSUVASTATIN CALCIUM 20 MG/1
20 TABLET, COATED ORAL DAILY
Qty: 90 TABLET | Refills: 1 | Status: SHIPPED | OUTPATIENT
Start: 2023-08-25 | End: 2024-02-21

## 2023-08-25 RX ORDER — FENOFIBRATE 160 MG/1
160 TABLET ORAL DAILY
Qty: 90 TABLET | Refills: 1 | Status: SHIPPED | OUTPATIENT
Start: 2023-08-25 | End: 2024-02-21

## 2023-08-25 RX ORDER — FOLIC ACID 1 MG/1
1 TABLET ORAL DAILY
Qty: 90 TABLET | Refills: 1 | Status: SHIPPED | OUTPATIENT
Start: 2023-08-25 | End: 2024-02-21

## 2023-09-05 ENCOUNTER — TELEPHONE (OUTPATIENT)
Dept: PRIMARY CARE CLINIC | Age: 78
End: 2023-09-05

## 2023-09-05 NOTE — TELEPHONE ENCOUNTER
----- Message from [employer] sent at 9/5/2023  2:42 PM EDT -----  Subject: Message to Provider    QUESTIONS  Information for Provider? Patient has new patient appt and had blood work   and chest xray ordered from other provider. Sury Marin in [facility]. Patient wants to provide this info to PCP prior to appt. Should have   results in a couple of days. ---------------------------------------------------------------------------  --------------  Gwyn VERMA  [phone]; OK to leave message on voicemail  ---------------------------------------------------------------------------  --------------  SCRIPT ANSWERS  Relationship to Patient?  Self

## 2023-09-21 ENCOUNTER — OFFICE VISIT (OUTPATIENT)
Dept: PRIMARY CARE CLINIC | Age: 78
End: 2023-09-21
Payer: MEDICARE

## 2023-09-21 VITALS
RESPIRATION RATE: 16 BRPM | WEIGHT: 246 LBS | HEIGHT: 71 IN | OXYGEN SATURATION: 97 % | BODY MASS INDEX: 34.44 KG/M2 | SYSTOLIC BLOOD PRESSURE: 124 MMHG | HEART RATE: 75 BPM | DIASTOLIC BLOOD PRESSURE: 72 MMHG

## 2023-09-21 DIAGNOSIS — Z12.11 COLON CANCER SCREENING: ICD-10-CM

## 2023-09-21 DIAGNOSIS — Z76.89 ENCOUNTER TO ESTABLISH CARE: Primary | ICD-10-CM

## 2023-09-21 DIAGNOSIS — J90 RECURRENT PLEURAL EFFUSION: ICD-10-CM

## 2023-09-21 DIAGNOSIS — I50.22 CHRONIC SYSTOLIC CONGESTIVE HEART FAILURE, NYHA CLASS 3 (HCC): ICD-10-CM

## 2023-09-21 DIAGNOSIS — F34.1 PERSISTENT DEPRESSIVE DISORDER: ICD-10-CM

## 2023-09-21 DIAGNOSIS — I10 ESSENTIAL HYPERTENSION: ICD-10-CM

## 2023-09-21 DIAGNOSIS — I48.0 PAF (PAROXYSMAL ATRIAL FIBRILLATION) (HCC): ICD-10-CM

## 2023-09-21 DIAGNOSIS — E78.5 HYPERLIPIDEMIA, UNSPECIFIED HYPERLIPIDEMIA TYPE: ICD-10-CM

## 2023-09-21 PROCEDURE — 1123F ACP DISCUSS/DSCN MKR DOCD: CPT | Performed by: NURSE PRACTITIONER

## 2023-09-21 PROCEDURE — 3074F SYST BP LT 130 MM HG: CPT | Performed by: NURSE PRACTITIONER

## 2023-09-21 PROCEDURE — 99204 OFFICE O/P NEW MOD 45 MIN: CPT | Performed by: NURSE PRACTITIONER

## 2023-09-21 PROCEDURE — G8417 CALC BMI ABV UP PARAM F/U: HCPCS | Performed by: NURSE PRACTITIONER

## 2023-09-21 PROCEDURE — G0009 ADMIN PNEUMOCOCCAL VACCINE: HCPCS | Performed by: NURSE PRACTITIONER

## 2023-09-21 PROCEDURE — G0008 ADMIN INFLUENZA VIRUS VAC: HCPCS | Performed by: NURSE PRACTITIONER

## 2023-09-21 PROCEDURE — 3078F DIAST BP <80 MM HG: CPT | Performed by: NURSE PRACTITIONER

## 2023-09-21 PROCEDURE — 90677 PCV20 VACCINE IM: CPT | Performed by: NURSE PRACTITIONER

## 2023-09-21 PROCEDURE — G8427 DOCREV CUR MEDS BY ELIG CLIN: HCPCS | Performed by: NURSE PRACTITIONER

## 2023-09-21 PROCEDURE — 1036F TOBACCO NON-USER: CPT | Performed by: NURSE PRACTITIONER

## 2023-09-21 PROCEDURE — 90694 VACC AIIV4 NO PRSRV 0.5ML IM: CPT | Performed by: NURSE PRACTITIONER

## 2023-09-21 RX ORDER — SPIRONOLACTONE 25 MG/1
25 TABLET ORAL DAILY
COMMUNITY

## 2023-09-21 SDOH — ECONOMIC STABILITY: FOOD INSECURITY: WITHIN THE PAST 12 MONTHS, YOU WORRIED THAT YOUR FOOD WOULD RUN OUT BEFORE YOU GOT MONEY TO BUY MORE.: NEVER TRUE

## 2023-09-21 SDOH — ECONOMIC STABILITY: INCOME INSECURITY: HOW HARD IS IT FOR YOU TO PAY FOR THE VERY BASICS LIKE FOOD, HOUSING, MEDICAL CARE, AND HEATING?: NOT HARD AT ALL

## 2023-09-21 SDOH — ECONOMIC STABILITY: HOUSING INSECURITY
IN THE LAST 12 MONTHS, WAS THERE A TIME WHEN YOU DID NOT HAVE A STEADY PLACE TO SLEEP OR SLEPT IN A SHELTER (INCLUDING NOW)?: NO

## 2023-09-21 SDOH — ECONOMIC STABILITY: FOOD INSECURITY: WITHIN THE PAST 12 MONTHS, THE FOOD YOU BOUGHT JUST DIDN'T LAST AND YOU DIDN'T HAVE MONEY TO GET MORE.: NEVER TRUE

## 2023-09-21 ASSESSMENT — PATIENT HEALTH QUESTIONNAIRE - PHQ9
10. IF YOU CHECKED OFF ANY PROBLEMS, HOW DIFFICULT HAVE THESE PROBLEMS MADE IT FOR YOU TO DO YOUR WORK, TAKE CARE OF THINGS AT HOME, OR GET ALONG WITH OTHER PEOPLE: 0
9. THOUGHTS THAT YOU WOULD BE BETTER OFF DEAD, OR OF HURTING YOURSELF: 0
SUM OF ALL RESPONSES TO PHQ QUESTIONS 1-9: 0
8. MOVING OR SPEAKING SO SLOWLY THAT OTHER PEOPLE COULD HAVE NOTICED. OR THE OPPOSITE, BEING SO FIGETY OR RESTLESS THAT YOU HAVE BEEN MOVING AROUND A LOT MORE THAN USUAL: 0
1. LITTLE INTEREST OR PLEASURE IN DOING THINGS: 0
6. FEELING BAD ABOUT YOURSELF - OR THAT YOU ARE A FAILURE OR HAVE LET YOURSELF OR YOUR FAMILY DOWN: 0
SUM OF ALL RESPONSES TO PHQ QUESTIONS 1-9: 0
2. FEELING DOWN, DEPRESSED OR HOPELESS: 0
SUM OF ALL RESPONSES TO PHQ QUESTIONS 1-9: 0
4. FEELING TIRED OR HAVING LITTLE ENERGY: 0
7. TROUBLE CONCENTRATING ON THINGS, SUCH AS READING THE NEWSPAPER OR WATCHING TELEVISION: 0
3. TROUBLE FALLING OR STAYING ASLEEP: 0
SUM OF ALL RESPONSES TO PHQ QUESTIONS 1-9: 0
5. POOR APPETITE OR OVEREATING: 0
SUM OF ALL RESPONSES TO PHQ9 QUESTIONS 1 & 2: 0

## 2023-09-21 NOTE — PROGRESS NOTES
[address]  PRIMARY CARE  [address]  [address] [facility] [address]  [place] [address]  Dept: [phone]  Dept Fax: [phone]    Elijah Favre is a 66 y.o. male who presents today for his medical conditions/complaintsas noted below. Elijah Favre is c/o of [facility]        HPI:     Pt presents to [facility]. [facility] pcp was at [facility]  Bp stable  Weight is down since last visit. Patient presents to establish care. His daughter is with him today. She does help to take him to most of his appointments. He has not seen his PCP in over a year. He mainly is follows with his pulmonologist and cardiologist.  He has been suffering from a recurrent pleural effusion. He does have a Pleurx catheter which he has someone come to his house twice a week to drain. They will only take at most a liter at 1 time. He did recently have a Holter monitor that he wore for 2 weeks. He has upcoming appointment with cardiology. He has a remote history of 2 ablations. After his second 1 about 3 years ago it caused him to have thyroid issues. He has not been the same since then. Nodes really been able to tell him why he is having recurrent pleural effusions. He does have a mild decrease in his heart function. He does live alone. He does use a wheelchair when he is out of the home. He is in a wheelchair today. He is mobile around his home. He is able to bathe and take care of himself. He does have 2 children that live near him    He is just here to [facility] care. He has no current concerns.         Past Medical History:   Diagnosis Date    A-fib [facility])     Anxiety     Arrhythmia     Depression     Hyperlipidemia     Hypertension     Obesity       Past Surgical History:   Procedure Laterality Date    ATRIAL ABLATION SURGERY  2013    JOINT REPLACEMENT Left     TONSILLECTOMY         Family History   Problem Relation Age of Onset    Stroke Mother     Thyroid Disease Mother     No Known

## 2023-09-22 PROBLEM — Z79.899 ENCOUNTER FOR LONG-TERM (CURRENT) USE OF MEDICATIONS: Status: RESOLVED | Noted: 2017-11-09 | Resolved: 2023-09-22

## 2023-09-22 PROBLEM — I48.11 LONGSTANDING PERSISTENT ATRIAL FIBRILLATION (HCC): Status: RESOLVED | Noted: 2020-11-09 | Resolved: 2023-09-22

## 2023-09-22 PROBLEM — J90 RECURRENT PLEURAL EFFUSION: Status: ACTIVE | Noted: 2023-09-22

## 2023-09-22 ASSESSMENT — ENCOUNTER SYMPTOMS
TROUBLE SWALLOWING: 0
DIARRHEA: 0
EYE ITCHING: 0
ABDOMINAL PAIN: 0
NAUSEA: 0
SINUS PRESSURE: 0
CHEST TIGHTNESS: 0
EYE REDNESS: 0
EYE DISCHARGE: 0
WHEEZING: 0
VOMITING: 0
SHORTNESS OF BREATH: 0
COUGH: 0
SINUS PAIN: 0
SORE THROAT: 0

## 2023-10-10 ENCOUNTER — APPOINTMENT (OUTPATIENT)
Dept: GENERAL RADIOLOGY | Age: 78
End: 2023-10-10
Payer: MEDICARE

## 2023-10-10 ENCOUNTER — HOSPITAL ENCOUNTER (EMERGENCY)
Age: 78
Discharge: HOME | End: 2023-10-10
Attending: STUDENT IN AN ORGANIZED HEALTH CARE EDUCATION/TRAINING PROGRAM
Payer: MEDICARE

## 2023-10-10 ENCOUNTER — TELEPHONE (OUTPATIENT)
Dept: PRIMARY CARE CLINIC | Age: 78
End: 2023-10-10

## 2023-10-10 VITALS
SYSTOLIC BLOOD PRESSURE: 141 MMHG | TEMPERATURE: 97.7 F | OXYGEN SATURATION: 98 % | BODY MASS INDEX: 33.59 KG/M2 | DIASTOLIC BLOOD PRESSURE: 65 MMHG | HEIGHT: 72 IN | HEART RATE: 124 BPM | WEIGHT: 248 LBS

## 2023-10-10 DIAGNOSIS — S42.291A OTHER CLOSED DISPLACED FRACTURE OF PROXIMAL END OF RIGHT HUMERUS, INITIAL ENCOUNTER: Primary | ICD-10-CM

## 2023-10-10 PROCEDURE — 73060 X-RAY EXAM OF HUMERUS: CPT

## 2023-10-10 PROCEDURE — 99283 EMERGENCY DEPT VISIT LOW MDM: CPT

## 2023-10-10 PROCEDURE — 6370000000 HC RX 637 (ALT 250 FOR IP): Performed by: NURSE PRACTITIONER

## 2023-10-10 RX ORDER — HYDROCODONE BITARTRATE AND ACETAMINOPHEN 5; 325 MG/1; MG/1
1 TABLET ORAL EVERY 6 HOURS PRN
Qty: 12 TABLET | Refills: 0 | Status: SHIPPED | OUTPATIENT
Start: 2023-10-10 | End: 2023-10-13

## 2023-10-10 RX ORDER — HYDROCODONE BITARTRATE AND ACETAMINOPHEN 5; 325 MG/1; MG/1
1 TABLET ORAL ONCE
Status: COMPLETED | OUTPATIENT
Start: 2023-10-10 | End: 2023-10-10

## 2023-10-10 RX ADMIN — HYDROCODONE BITARTRATE AND ACETAMINOPHEN 1 TABLET: 5; 325 TABLET ORAL at 15:05

## 2023-10-10 ASSESSMENT — PAIN - FUNCTIONAL ASSESSMENT: PAIN_FUNCTIONAL_ASSESSMENT: NONE - DENIES PAIN

## 2023-10-10 NOTE — TELEPHONE ENCOUNTER
Mihai Mckeon (patient's daughter) states that patient fell yesterday and she is almost certain he broke his right shoulder. She states he did not hit his head but has multiple health issues that include CHF. Patient is requesting to go to the [facility] ER and Mihai Mckeon is about to take him there but called the office to report the fall and ask if we were confident with the ER and if they will send him home without obtaining results of any testing.     Writer reassured her that it is a fully functioning ER and if they order testing, they will determine if it is safe for patient to go home prior to sending him home and that a lot of our patient utilize the ER at that hospital.    Mihai Mckeon verbalized understanding    Will route message to PCP for [facility]

## 2023-10-10 NOTE — ED PROVIDER NOTES
[address] [facility][note 3]  Emergency Department Encounter  Mid Level Provider     Pt Name: Natasha Guerrero  MRN: [id number]  [address] [facility] 1945  Date of evaluation: 10/10/23  PCP:  ANDERSON Desouza - CNP    CHIEF COMPLAINT       Chief Complaint   Patient presents with    Hosie Bur yesterday on right side - no hitting of head or loc  Pain of right shoulder-unable to do ROM   On blood thinners        HISTORY OF PRESENT ILLNESS  (Location/Symptom, Timing/Onset,Context/Setting, Quality, Duration, Modifying Factors, Severity.)      Natasha Guerrero is a 66 y.o. male who presents with mechanical fall yesterday landing on his right shoulder. Did not strike his head or pass out. He cannot tolerate any type of range of motion of the right shoulder and is concerned for injury. PAST MEDICAL /SURGICAL / SOCIAL / FAMILY HISTORY      has a past medical history of A-fib ([address]), Anxiety, Arrhythmia, Depression, Hyperlipidemia, Hypertension, and Obesity. has a past surgical history that includes joint replacement (Left); Tonsillectomy; and Atrial ablation surgery (2013). Social History     Socioeconomic History    Marital status:      Spouse name: Not on file    Number of children: Not on file    Years of education: Not on file    Highest education level: Not on file   Occupational History    Not on file   Tobacco Use    Smoking status: Never    Smokeless tobacco: Never   Vaping Use    Vaping Use: Never used   Substance and Sexual Activity    Alcohol use:  Yes     Alcohol/week: 4.0 standard drinks of alcohol     Types: 4 Cans of beer per week     Comment: whiskey 1 or 2 a day    Drug use: Never    Sexual activity: Not Currently   Other Topics Concern    Not on file   Social History Narrative    Not on file     Social Determinants of Health     Financial Resource Strain: Low Risk  (9/21/2023)    Overall Financial Resource Strain (CARDIA)     Difficulty of Paying Living Expenses:
HYDROcodone-acetaminophen (NORCO) 5-325 MG per tablet Take 1 tablet by mouth every 6 hours as needed for Pain for up to 3 days. Intended supply: 3 days.  Take lowest dose possible to manage pain Max Daily Amount: 4 tablets, Disp-12 tablet, R-0Normal             Dr. Santos   Attending Physician, Emergency Medicine    (Please note that portions of this note were completed with a voice recognition program.  Efforts were made to edit the dictations but occasionally words are mis-transcribed.)               Chantelle Angeles DO  10/11/23 2910

## 2023-10-10 NOTE — DISCHARGE INSTRUCTIONS
Please follow-up tomorrow as scheduled with orthopedic at 1:00. Arrive 15 minutes early. The sling is for support and stability. You may need to sleep in a recliner for the next couple of nights. You can try ice over your shoulder 15 minutes at a time. The medication is a narcotic so if you do take it cannot drink alcohol or drive a car or operate machinery.   If symptoms worsen or new concerns develop return back to the emergency room

## 2023-10-11 ENCOUNTER — OFFICE VISIT (OUTPATIENT)
Dept: ORTHOPEDIC SURGERY | Age: 78
End: 2023-10-11
Payer: MEDICARE

## 2023-10-11 VITALS — WEIGHT: 248 LBS | BODY MASS INDEX: 34.72 KG/M2 | RESPIRATION RATE: 14 BRPM | HEIGHT: 71 IN

## 2023-10-11 DIAGNOSIS — M25.511 RIGHT SHOULDER PAIN, UNSPECIFIED CHRONICITY: Primary | ICD-10-CM

## 2023-10-11 DIAGNOSIS — S42.291A OTHER CLOSED DISPLACED FRACTURE OF PROXIMAL END OF RIGHT HUMERUS, INITIAL ENCOUNTER: ICD-10-CM

## 2023-10-11 PROCEDURE — 1123F ACP DISCUSS/DSCN MKR DOCD: CPT | Performed by: ORTHOPAEDIC SURGERY

## 2023-10-11 PROCEDURE — G8417 CALC BMI ABV UP PARAM F/U: HCPCS | Performed by: ORTHOPAEDIC SURGERY

## 2023-10-11 PROCEDURE — G8484 FLU IMMUNIZE NO ADMIN: HCPCS | Performed by: ORTHOPAEDIC SURGERY

## 2023-10-11 PROCEDURE — 99203 OFFICE O/P NEW LOW 30 MIN: CPT | Performed by: ORTHOPAEDIC SURGERY

## 2023-10-11 PROCEDURE — 1036F TOBACCO NON-USER: CPT | Performed by: ORTHOPAEDIC SURGERY

## 2023-10-11 PROCEDURE — 23600 CLTX PROX HUMRL FX W/O MNPJ: CPT | Performed by: ORTHOPAEDIC SURGERY

## 2023-10-11 PROCEDURE — G8427 DOCREV CUR MEDS BY ELIG CLIN: HCPCS | Performed by: ORTHOPAEDIC SURGERY

## 2023-10-11 NOTE — PROGRESS NOTES
ORTHOPEDIC PATIENT EVALUATION      HPI / Chief Complaint  Blanca Mathews is a 66 y.o. right-hand-dominant male who presents for evaluation of his right shoulder. On 10/9/2023 he states that he lost his balance walking out of the shed landing on his right shoulder on concrete. He had immediate pain in the shoulder. No head trauma or loss of consciousness. He was seen in the emergency department and diagnosed with a proximal humerus fracture. He was placed in a sling and presents today for further evaluation and treatment. His pain is localized to the shoulder. He denies having any associated numbness or tingling. Of note he does take Eliquis for atrial fibrillation. He also states that he has his left lung aspirated regularly for what he believes is congestive heart failure. Past Medical History  Gege Corona  has a past medical history of A-fib ([address]), Anxiety, Arrhythmia, Depression, Hyperlipidemia, Hypertension, and Obesity. Past Surgical History  Gege Corona  has a past surgical history that includes joint replacement (Left); Tonsillectomy; and Atrial ablation surgery (2013). Current Medications  Current Outpatient Medications   Medication Sig Dispense Refill    HYDROcodone-acetaminophen (NORCO) 5-325 MG per tablet Take 1 tablet by mouth every 6 hours as needed for Pain for up to 3 days. Intended supply: 3 days.  Take lowest dose possible to manage pain Max Daily Amount: 4 tablets 12 tablet 0    vitamin D (CHOLECALCIFEROL) 125 MCG (5000 UT) CAPS capsule Take 1 capsule by mouth daily      spironolactone (ALDACTONE) 25 MG tablet Take 1 tablet by mouth daily      metoprolol succinate (TOPROL XL) 100 MG extended release tablet Take 1 tablet by mouth nightly 90 tablet 1    folic acid (FOLVITE) 1 MG tablet Take 1 tablet by mouth daily 90 tablet 1    fenofibrate (TRIGLIDE) 160 MG tablet Take 1 tablet by mouth daily 90 tablet 1    amLODIPine (NORVASC) 5 MG tablet Take 1 tablet by mouth daily 90 tablet 1

## 2023-10-17 ENCOUNTER — TELEPHONE (OUTPATIENT)
Dept: ORTHOPEDIC SURGERY | Age: 78
End: 2023-10-17

## 2023-10-17 NOTE — TELEPHONE ENCOUNTER
Patient called in with questions regarding the healing of his humerus fx. Patient was informed that we typically xr his type of fracture each time he comes into the office to make sure everything is staying on course with regards to the healing process. Patient is also going to discuss the details of the healing process of fractures the next time he sees Dr. Victor Manuel Almaraz in office. All questions with patient were answered.

## 2023-10-20 ENCOUNTER — PATIENT MESSAGE (OUTPATIENT)
Dept: PRIMARY CARE CLINIC | Age: 78
End: 2023-10-20

## 2023-10-25 ENCOUNTER — TELEPHONE (OUTPATIENT)
Dept: ORTHOPEDIC SURGERY | Age: 78
End: 2023-10-25

## 2023-10-25 NOTE — TELEPHONE ENCOUNTER
Patient's PCP office called stating that their office is ordering a chest XR for the patient and the patient was hoping to get the chest xr done in our office later today during his appt with Dr. Victor Manuel Almaraz for his right shoulder. I told the nurse that it was unlikely that we could complete the chest XR during the visit with Dr. Victor Manuel Almaraz since Dr. Victor Manuel Almaraz did not order the XR. I suggested that the patient contact the imaging dept at [address] [place] [address] (same building as the location of Dr. Josie Peterson appt) to see if a chest XR order could be faxed to them and the patient complete the chest xr there either before or after his appt with Dr. Victor Manuel Almaraz.  Nurse was amendable to that plan and was provided with the phone number for the imaging office at [address] [facility] ([phone])

## 2023-11-01 ENCOUNTER — OFFICE VISIT (OUTPATIENT)
Dept: ORTHOPEDIC SURGERY | Age: 78
End: 2023-11-01

## 2023-11-01 ENCOUNTER — HOSPITAL ENCOUNTER (INPATIENT)
Age: 78
LOS: 9 days | Discharge: NURSING, REHAB OR LONG-TERM CARE | End: 2023-11-10
Attending: EMERGENCY MEDICINE | Admitting: STUDENT IN AN ORGANIZED HEALTH CARE EDUCATION/TRAINING PROGRAM
Payer: MEDICARE

## 2023-11-01 ENCOUNTER — APPOINTMENT (OUTPATIENT)
Dept: GENERAL RADIOLOGY | Age: 78
End: 2023-11-01
Payer: MEDICARE

## 2023-11-01 VITALS — HEIGHT: 71 IN | WEIGHT: 248 LBS | BODY MASS INDEX: 34.72 KG/M2

## 2023-11-01 DIAGNOSIS — S42.201D CLOSED FRACTURE OF PROXIMAL END OF RIGHT HUMERUS WITH ROUTINE HEALING, UNSPECIFIED FRACTURE MORPHOLOGY, SUBSEQUENT ENCOUNTER: Primary | ICD-10-CM

## 2023-11-01 DIAGNOSIS — I50.9 ACUTE ON CHRONIC CONGESTIVE HEART FAILURE, UNSPECIFIED HEART FAILURE TYPE (HCC): ICD-10-CM

## 2023-11-01 DIAGNOSIS — M25.511 RIGHT SHOULDER PAIN, UNSPECIFIED CHRONICITY: ICD-10-CM

## 2023-11-01 DIAGNOSIS — D64.9 ANEMIA, UNSPECIFIED TYPE: ICD-10-CM

## 2023-11-01 DIAGNOSIS — J90 PLEURAL EFFUSION ON LEFT: Primary | ICD-10-CM

## 2023-11-01 LAB
ANION GAP SERPL CALCULATED.3IONS-SCNC: 6 MMOL/L (ref 9–17)
BASOPHILS # BLD: 0 K/UL (ref 0–0.2)
BASOPHILS NFR BLD: 0 % (ref 0–2)
BILIRUB UR QL STRIP: NEGATIVE
BNP SERPL-MCNC: 8225 PG/ML
BUN SERPL-MCNC: 28 MG/DL (ref 8–23)
CALCIUM SERPL-MCNC: 9.3 MG/DL (ref 8.6–10.4)
CHLORIDE SERPL-SCNC: 106 MMOL/L (ref 98–107)
CLARITY UR: CLEAR
CO2 SERPL-SCNC: 25 MMOL/L (ref 20–31)
COLOR UR: YELLOW
COMMENT: NORMAL
CREAT SERPL-MCNC: 0.8 MG/DL (ref 0.7–1.2)
EOSINOPHIL # BLD: 0.2 K/UL (ref 0–0.4)
EOSINOPHILS RELATIVE PERCENT: 3 % (ref 1–4)
ERYTHROCYTE [DISTWIDTH] IN BLOOD BY AUTOMATED COUNT: 14.5 % (ref 12.5–15.4)
GFR SERPL CREATININE-BSD FRML MDRD: >60 ML/MIN/1.73M2
GLUCOSE SERPL-MCNC: 97 MG/DL (ref 70–99)
GLUCOSE UR STRIP-MCNC: NEGATIVE MG/DL
HCT VFR BLD AUTO: 27.6 % (ref 41–53)
HGB BLD-MCNC: 9.3 G/DL (ref 13.5–17.5)
HGB UR QL STRIP.AUTO: NEGATIVE
INR PPP: 1
KETONES UR STRIP-MCNC: NEGATIVE MG/DL
LEUKOCYTE ESTERASE UR QL STRIP: NEGATIVE
LYMPHOCYTES NFR BLD: 1.2 K/UL (ref 1–4.8)
LYMPHOCYTES RELATIVE PERCENT: 16 % (ref 24–44)
MCH RBC QN AUTO: 36.9 PG (ref 26–34)
MCHC RBC AUTO-ENTMCNC: 33.6 G/DL (ref 31–37)
MCV RBC AUTO: 109.9 FL (ref 80–100)
MONOCYTES NFR BLD: 0.7 K/UL (ref 0.1–1.2)
MONOCYTES NFR BLD: 10 % (ref 2–11)
NEUTROPHILS NFR BLD: 71 % (ref 36–66)
NEUTS SEG NFR BLD: 5.5 K/UL (ref 1.8–7.7)
NITRITE UR QL STRIP: NEGATIVE
PH UR STRIP: 5.5 [PH] (ref 5–8)
PLATELET # BLD AUTO: 395 K/UL (ref 140–450)
PMV BLD AUTO: 7.7 FL (ref 6–12)
POTASSIUM SERPL-SCNC: 4.6 MMOL/L (ref 3.7–5.3)
PROT UR STRIP-MCNC: NEGATIVE MG/DL
PROTHROMBIN TIME: 10.5 SEC (ref 9.4–12.6)
RBC # BLD AUTO: 2.51 M/UL (ref 4.5–5.9)
SODIUM SERPL-SCNC: 137 MMOL/L (ref 135–144)
SP GR UR STRIP: 1.02 (ref 1–1.03)
TROPONIN I SERPL HS-MCNC: 34 NG/L (ref 0–22)
UROBILINOGEN UR STRIP-ACNC: NORMAL EU/DL (ref 0–1)
WBC OTHER # BLD: 7.7 K/UL (ref 3.5–11)

## 2023-11-01 PROCEDURE — 81003 URINALYSIS AUTO W/O SCOPE: CPT

## 2023-11-01 PROCEDURE — 36415 COLL VENOUS BLD VENIPUNCTURE: CPT

## 2023-11-01 PROCEDURE — 83540 ASSAY OF IRON: CPT

## 2023-11-01 PROCEDURE — 93005 ELECTROCARDIOGRAM TRACING: CPT | Performed by: NURSE PRACTITIONER

## 2023-11-01 PROCEDURE — 83550 IRON BINDING TEST: CPT

## 2023-11-01 PROCEDURE — 1200000000 HC SEMI PRIVATE

## 2023-11-01 PROCEDURE — 80048 BASIC METABOLIC PNL TOTAL CA: CPT

## 2023-11-01 PROCEDURE — 6370000000 HC RX 637 (ALT 250 FOR IP): Performed by: HOSPITALIST

## 2023-11-01 PROCEDURE — 84484 ASSAY OF TROPONIN QUANT: CPT

## 2023-11-01 PROCEDURE — 2580000003 HC RX 258: Performed by: HOSPITALIST

## 2023-11-01 PROCEDURE — 6370000000 HC RX 637 (ALT 250 FOR IP): Performed by: NURSE PRACTITIONER

## 2023-11-01 PROCEDURE — 71045 X-RAY EXAM CHEST 1 VIEW: CPT

## 2023-11-01 PROCEDURE — 6360000002 HC RX W HCPCS: Performed by: NURSE PRACTITIONER

## 2023-11-01 PROCEDURE — 85610 PROTHROMBIN TIME: CPT

## 2023-11-01 PROCEDURE — 85025 COMPLETE CBC W/AUTO DIFF WBC: CPT

## 2023-11-01 PROCEDURE — 83880 ASSAY OF NATRIURETIC PEPTIDE: CPT

## 2023-11-01 PROCEDURE — 96374 THER/PROPH/DIAG INJ IV PUSH: CPT

## 2023-11-01 PROCEDURE — 99024 POSTOP FOLLOW-UP VISIT: CPT | Performed by: ORTHOPAEDIC SURGERY

## 2023-11-01 PROCEDURE — 99285 EMERGENCY DEPT VISIT HI MDM: CPT

## 2023-11-01 RX ORDER — LANOLIN ALCOHOL/MO/W.PET/CERES
3 CREAM (GRAM) TOPICAL NIGHTLY PRN
Status: DISCONTINUED | OUTPATIENT
Start: 2023-11-01 | End: 2023-11-10 | Stop reason: HOSPADM

## 2023-11-01 RX ORDER — ONDANSETRON 4 MG/1
4 TABLET, ORALLY DISINTEGRATING ORAL EVERY 8 HOURS PRN
Status: DISCONTINUED | OUTPATIENT
Start: 2023-11-01 | End: 2023-11-10 | Stop reason: HOSPADM

## 2023-11-01 RX ORDER — SODIUM CHLORIDE 0.9 % (FLUSH) 0.9 %
5-40 SYRINGE (ML) INJECTION PRN
Status: DISCONTINUED | OUTPATIENT
Start: 2023-11-01 | End: 2023-11-10 | Stop reason: HOSPADM

## 2023-11-01 RX ORDER — ACETAMINOPHEN 650 MG/1
650 SUPPOSITORY RECTAL EVERY 6 HOURS PRN
Status: DISCONTINUED | OUTPATIENT
Start: 2023-11-01 | End: 2023-11-10 | Stop reason: HOSPADM

## 2023-11-01 RX ORDER — ACETAMINOPHEN 325 MG/1
650 TABLET ORAL EVERY 6 HOURS PRN
Status: DISCONTINUED | OUTPATIENT
Start: 2023-11-01 | End: 2023-11-10 | Stop reason: HOSPADM

## 2023-11-01 RX ORDER — ESCITALOPRAM OXALATE 10 MG/1
20 TABLET ORAL DAILY
Status: DISCONTINUED | OUTPATIENT
Start: 2023-11-02 | End: 2023-11-10 | Stop reason: HOSPADM

## 2023-11-01 RX ORDER — FUROSEMIDE 10 MG/ML
40 INJECTION INTRAMUSCULAR; INTRAVENOUS 2 TIMES DAILY
Status: DISCONTINUED | OUTPATIENT
Start: 2023-11-02 | End: 2023-11-05

## 2023-11-01 RX ORDER — POTASSIUM CHLORIDE 20 MEQ/1
40 TABLET, EXTENDED RELEASE ORAL PRN
Status: DISCONTINUED | OUTPATIENT
Start: 2023-11-01 | End: 2023-11-10 | Stop reason: HOSPADM

## 2023-11-01 RX ORDER — ROSUVASTATIN CALCIUM 20 MG/1
20 TABLET, COATED ORAL DAILY
Status: DISCONTINUED | OUTPATIENT
Start: 2023-11-01 | End: 2023-11-10 | Stop reason: HOSPADM

## 2023-11-01 RX ORDER — CHOLECALCIFEROL (VITAMIN D3) 125 MCG
500 CAPSULE ORAL DAILY
Status: DISCONTINUED | OUTPATIENT
Start: 2023-11-02 | End: 2023-11-10 | Stop reason: HOSPADM

## 2023-11-01 RX ORDER — SODIUM CHLORIDE 9 MG/ML
INJECTION, SOLUTION INTRAVENOUS PRN
Status: DISCONTINUED | OUTPATIENT
Start: 2023-11-01 | End: 2023-11-10 | Stop reason: HOSPADM

## 2023-11-01 RX ORDER — SODIUM CHLORIDE 0.9 % (FLUSH) 0.9 %
5-40 SYRINGE (ML) INJECTION EVERY 12 HOURS SCHEDULED
Status: DISCONTINUED | OUTPATIENT
Start: 2023-11-01 | End: 2023-11-10 | Stop reason: HOSPADM

## 2023-11-01 RX ORDER — LISINOPRIL 5 MG/1
5 TABLET ORAL DAILY
Status: DISCONTINUED | OUTPATIENT
Start: 2023-11-02 | End: 2023-11-10 | Stop reason: HOSPADM

## 2023-11-01 RX ORDER — FUROSEMIDE 10 MG/ML
40 INJECTION INTRAMUSCULAR; INTRAVENOUS ONCE
Status: COMPLETED | OUTPATIENT
Start: 2023-11-01 | End: 2023-11-01

## 2023-11-01 RX ORDER — FENOFIBRATE 160 MG/1
160 TABLET ORAL DAILY
Status: DISCONTINUED | OUTPATIENT
Start: 2023-11-02 | End: 2023-11-10 | Stop reason: HOSPADM

## 2023-11-01 RX ORDER — VITAMIN B COMPLEX
5000 TABLET ORAL DAILY
Status: DISCONTINUED | OUTPATIENT
Start: 2023-11-02 | End: 2023-11-10 | Stop reason: HOSPADM

## 2023-11-01 RX ORDER — FOLIC ACID 1 MG/1
1 TABLET ORAL DAILY
Status: DISCONTINUED | OUTPATIENT
Start: 2023-11-02 | End: 2023-11-10 | Stop reason: HOSPADM

## 2023-11-01 RX ORDER — LANOLIN ALCOHOL/MO/W.PET/CERES
400 CREAM (GRAM) TOPICAL DAILY
Status: DISCONTINUED | OUTPATIENT
Start: 2023-11-02 | End: 2023-11-10 | Stop reason: HOSPADM

## 2023-11-01 RX ORDER — METOPROLOL SUCCINATE 50 MG/1
100 TABLET, EXTENDED RELEASE ORAL NIGHTLY
Status: DISCONTINUED | OUTPATIENT
Start: 2023-11-01 | End: 2023-11-09

## 2023-11-01 RX ORDER — POLYETHYLENE GLYCOL 3350 17 G/17G
17 POWDER, FOR SOLUTION ORAL DAILY PRN
Status: DISCONTINUED | OUTPATIENT
Start: 2023-11-01 | End: 2023-11-10 | Stop reason: HOSPADM

## 2023-11-01 RX ORDER — POTASSIUM CHLORIDE 7.45 MG/ML
10 INJECTION INTRAVENOUS PRN
Status: DISCONTINUED | OUTPATIENT
Start: 2023-11-01 | End: 2023-11-10 | Stop reason: HOSPADM

## 2023-11-01 RX ORDER — MAGNESIUM SULFATE IN WATER 40 MG/ML
2000 INJECTION, SOLUTION INTRAVENOUS PRN
Status: DISCONTINUED | OUTPATIENT
Start: 2023-11-01 | End: 2023-11-10 | Stop reason: HOSPADM

## 2023-11-01 RX ORDER — SPIRONOLACTONE 25 MG/1
25 TABLET ORAL DAILY
Status: DISCONTINUED | OUTPATIENT
Start: 2023-11-02 | End: 2023-11-10 | Stop reason: HOSPADM

## 2023-11-01 RX ORDER — AMLODIPINE BESYLATE 5 MG/1
5 TABLET ORAL DAILY
Status: DISCONTINUED | OUTPATIENT
Start: 2023-11-02 | End: 2023-11-10 | Stop reason: HOSPADM

## 2023-11-01 RX ORDER — LEVOTHYROXINE SODIUM 0.07 MG/1
150 TABLET ORAL DAILY
Status: DISCONTINUED | OUTPATIENT
Start: 2023-11-02 | End: 2023-11-10 | Stop reason: HOSPADM

## 2023-11-01 RX ORDER — ONDANSETRON 2 MG/ML
4 INJECTION INTRAMUSCULAR; INTRAVENOUS EVERY 6 HOURS PRN
Status: DISCONTINUED | OUTPATIENT
Start: 2023-11-01 | End: 2023-11-10 | Stop reason: HOSPADM

## 2023-11-01 RX ADMIN — FUROSEMIDE 40 MG: 10 INJECTION, SOLUTION INTRAMUSCULAR; INTRAVENOUS at 16:55

## 2023-11-01 RX ADMIN — METOPROLOL SUCCINATE 100 MG: 50 TABLET, EXTENDED RELEASE ORAL at 23:27

## 2023-11-01 RX ADMIN — SODIUM CHLORIDE, PRESERVATIVE FREE 10 ML: 5 INJECTION INTRAVENOUS at 23:29

## 2023-11-01 RX ADMIN — ACETAMINOPHEN 650 MG: 325 TABLET ORAL at 23:27

## 2023-11-01 RX ADMIN — ROSUVASTATIN 20 MG: 20 TABLET, FILM COATED ORAL at 23:27

## 2023-11-01 RX ADMIN — MELATONIN 3 MG: 3 TAB ORAL at 23:27

## 2023-11-01 RX ADMIN — APIXABAN 5 MG: 5 TABLET, FILM COATED ORAL at 23:27

## 2023-11-01 ASSESSMENT — PAIN DESCRIPTION - DESCRIPTORS
DESCRIPTORS: ACHING;STABBING
DESCRIPTORS: STABBING;ACHING

## 2023-11-01 ASSESSMENT — PAIN SCALES - GENERAL
PAINLEVEL_OUTOF10: 4
PAINLEVEL_OUTOF10: 8
PAINLEVEL_OUTOF10: 8
PAINLEVEL_OUTOF10: 0

## 2023-11-01 ASSESSMENT — PAIN - FUNCTIONAL ASSESSMENT
PAIN_FUNCTIONAL_ASSESSMENT: NONE - DENIES PAIN
PAIN_FUNCTIONAL_ASSESSMENT: PREVENTS OR INTERFERES SOME ACTIVE ACTIVITIES AND ADLS
PAIN_FUNCTIONAL_ASSESSMENT: PREVENTS OR INTERFERES SOME ACTIVE ACTIVITIES AND ADLS

## 2023-11-01 ASSESSMENT — PAIN DESCRIPTION - LOCATION
LOCATION: SHOULDER
LOCATION: SHOULDER

## 2023-11-01 ASSESSMENT — PAIN DESCRIPTION - ORIENTATION
ORIENTATION: RIGHT
ORIENTATION: RIGHT

## 2023-11-01 ASSESSMENT — PAIN DESCRIPTION - FREQUENCY: FREQUENCY: CONTINUOUS

## 2023-11-01 ASSESSMENT — PAIN DESCRIPTION - PAIN TYPE: TYPE: CHRONIC PAIN

## 2023-11-01 ASSESSMENT — PAIN DESCRIPTION - ONSET: ONSET: ON-GOING

## 2023-11-01 NOTE — ED PROVIDER NOTES
[facility]  Emergency Department Encounter  Mid Level Provider     Pt Name: Tiffanie Medina  MRN: [id number]  [address] [facility] 1945  Date of evaluation: 11/1/23  PCP:  ANDERSON Yanez CNP    CHIEF COMPLAINT       Chief Complaint   Patient presents with    Shortness of Breath     Pt arrives dt of sob . Pt does have to get thoracentesis for fluid removal. Pt daughter states he has a new company coming to the house and they have not been removing the usual 1 L of fluid . HISTORY OF PRESENT ILLNESS  (Location/Symptom, Timing/Onset,Context/Setting, Quality, Duration, Modifying Factors, Severity.)      Tiffanie Medina is a 66 y.o. male who presents with shortness of breath. Patient was on our campus for an orthopedic referral for right humeral fracture. When daughter assisted him out to the vehicle when he was transition from the wheelchair he became visibly very short of breath and pale and sweaty. She brought him into the emergency room. Patient does have known congestive heart. He has a known left recurrent pleural effusion and actually has a tube that remains in place that is drained on Monday and Friday. Patient denies any fever or chills. He will admit to feeling short of breath. Also endorsed chest pain. Patient states his previous technician actually came to the home to work with the left chest tube and would drain 1 L each time he came. That company went out of business. He states the new company does the procedure little different. He states on Friday for the first time she was able to drain 550 cc and on Monday was 200 cc. He is worried this is contributing to his symptoms. He also admits however he has not been compliant with his diuretic last 2 days because he was having trouble getting to the bathroom. He did take a dose this morning.     PAST MEDICAL /SURGICAL / SOCIAL / FAMILY HISTORY      has a past medical history of A-fib ([address]), Anxiety, Arrhythmia, with PACs rate 79, , , QTc 486    FINAL IMPRESSION      1. Pleural effusion on left    2. Acute on chronic congestive heart failure, unspecified heart failure type ([address])    3. Anemia, unspecified type          DISPOSITION / PLAN     DISPOSITION Admitted    PATIENT REFERRED TO:  No follow-up provider specified.     DISCHARGE MEDICATIONS:  Current Discharge Medication List          ANDERSON Staton CNP   Emergency Medicine Nurse Practitioner    (Please note that portions of this note were completed with a voice recognitionprogram.  Efforts were made to edit the dictations but occasionally words are mis-transcribed.)       ANDERSON Staton CNP  11/02/23 0145

## 2023-11-01 NOTE — PROGRESS NOTES
HPI: Mr. Mike Burch is a 55-year-old gentleman approximately 3 weeks out from a closed right proximal humerus fracture that we are currently managing conservatively. He states that he still quite painful. He has kept up with his sling but really has been doing his pendulum exercises. Physical examination:  Evaluation patient's right shoulder and upper extremity demonstrates moderate diffuse swelling. 2+ radial pulse with brisk cap refill in his fingers. He really is not tender to palpation about the shoulder but has pain with active and passive range of motion which is significantly limited due to this pain. Imaging studies:  4 x-ray views of the right shoulder completed on 11/1/2023 were reviewed independently demonstrating a proximal humerus fracture through the surgical neck. There has been progression of the fracture into mild varus angulation compared to earlier x-rays. No obvious dislocation or subluxation. There does appear to be some interval callus formation across the fracture site. Impression and plan: Mr. Mike Burch is a 55-year-old gentleman with a closed right proximal humerus fracture. As noted above there has been some change in the alignment but overall still appears to be reasonably aligned. Given this in addition to the patient's age and activity level I believe we can continue to manage this conservatively. Consequently he was instructed to continue on with the sling wear for an additional week at which time he can wean out of his sling and start using the arm for light activities of daily living limiting any lifting pushing or pulling to 1 to 2 pounds at the most.  He was encouraged to start working on his pendulum exercises and he was once again shown how to accomplish this safely. A prescription for physical therapy was provided to begin in 1 week's time. I will see him back for reevaluation in 5 weeks but he was encouraged to return or call earlier with any questions or concerns.

## 2023-11-02 ENCOUNTER — APPOINTMENT (OUTPATIENT)
Dept: GENERAL RADIOLOGY | Age: 78
End: 2023-11-02
Payer: MEDICARE

## 2023-11-02 PROBLEM — J90 PLEURAL EFFUSION ON LEFT: Status: ACTIVE | Noted: 2023-11-02

## 2023-11-02 LAB
ANION GAP SERPL CALCULATED.3IONS-SCNC: 6 MMOL/L (ref 9–17)
BUN SERPL-MCNC: 28 MG/DL (ref 8–23)
CALCIUM SERPL-MCNC: 9.2 MG/DL (ref 8.6–10.4)
CHLORIDE SERPL-SCNC: 100 MMOL/L (ref 98–107)
CHOLEST SERPL-MCNC: 94 MG/DL (ref 0–199)
CHOLESTEROL/HDL RATIO: 3
CO2 SERPL-SCNC: 23 MMOL/L (ref 20–31)
CREAT SERPL-MCNC: 0.9 MG/DL (ref 0.7–1.2)
EKG ATRIAL RATE: 79 BPM
EKG P AXIS: -2 DEGREES
EKG P-R INTERVAL: 202 MS
EKG Q-T INTERVAL: 424 MS
EKG QRS DURATION: 118 MS
EKG QTC CALCULATION (BAZETT): 486 MS
EKG R AXIS: 96 DEGREES
EKG T AXIS: 1 DEGREES
EKG VENTRICULAR RATE: 79 BPM
GFR SERPL CREATININE-BSD FRML MDRD: >60 ML/MIN/1.73M2
GLUCOSE SERPL-MCNC: 96 MG/DL (ref 70–99)
HDLC SERPL-MCNC: 28 MG/DL
IRON SATN MFR SERPL: 15 % (ref 20–55)
IRON SERPL-MCNC: 35 UG/DL (ref 59–158)
LDLC SERPL CALC-MCNC: 51 MG/DL (ref 0–100)
MAGNESIUM SERPL-MCNC: 1.5 MG/DL (ref 1.6–2.6)
POTASSIUM SERPL-SCNC: 4.3 MMOL/L (ref 3.7–5.3)
SODIUM SERPL-SCNC: 129 MMOL/L (ref 135–144)
TIBC SERPL-MCNC: 241 UG/DL (ref 250–450)
TRIGL SERPL-MCNC: 79 MG/DL (ref 0–149)
UNSATURATED IRON BINDING CAPACITY: 206 UG/DL (ref 112–347)
VLDLC SERPL CALC-MCNC: 16 MG/DL

## 2023-11-02 PROCEDURE — 1200000000 HC SEMI PRIVATE

## 2023-11-02 PROCEDURE — 80048 BASIC METABOLIC PNL TOTAL CA: CPT

## 2023-11-02 PROCEDURE — 6370000000 HC RX 637 (ALT 250 FOR IP): Performed by: STUDENT IN AN ORGANIZED HEALTH CARE EDUCATION/TRAINING PROGRAM

## 2023-11-02 PROCEDURE — 83735 ASSAY OF MAGNESIUM: CPT

## 2023-11-02 PROCEDURE — 99223 1ST HOSP IP/OBS HIGH 75: CPT | Performed by: INTERNAL MEDICINE

## 2023-11-02 PROCEDURE — 6370000000 HC RX 637 (ALT 250 FOR IP): Performed by: HOSPITALIST

## 2023-11-02 PROCEDURE — 97162 PT EVAL MOD COMPLEX 30 MIN: CPT

## 2023-11-02 PROCEDURE — 99221 1ST HOSP IP/OBS SF/LOW 40: CPT | Performed by: STUDENT IN AN ORGANIZED HEALTH CARE EDUCATION/TRAINING PROGRAM

## 2023-11-02 PROCEDURE — 6360000002 HC RX W HCPCS: Performed by: HOSPITALIST

## 2023-11-02 PROCEDURE — 36415 COLL VENOUS BLD VENIPUNCTURE: CPT

## 2023-11-02 PROCEDURE — 80061 LIPID PANEL: CPT

## 2023-11-02 PROCEDURE — 99222 1ST HOSP IP/OBS MODERATE 55: CPT | Performed by: NURSE PRACTITIONER

## 2023-11-02 PROCEDURE — 2580000003 HC RX 258: Performed by: HOSPITALIST

## 2023-11-02 PROCEDURE — 71045 X-RAY EXAM CHEST 1 VIEW: CPT

## 2023-11-02 PROCEDURE — 97116 GAIT TRAINING THERAPY: CPT

## 2023-11-02 RX ORDER — OXYCODONE HYDROCHLORIDE AND ACETAMINOPHEN 5; 325 MG/1; MG/1
1 TABLET ORAL EVERY 4 HOURS PRN
Status: DISPENSED | OUTPATIENT
Start: 2023-11-02 | End: 2023-11-05

## 2023-11-02 RX ADMIN — SODIUM CHLORIDE, PRESERVATIVE FREE 10 ML: 5 INJECTION INTRAVENOUS at 08:21

## 2023-11-02 RX ADMIN — APIXABAN 5 MG: 5 TABLET, FILM COATED ORAL at 21:11

## 2023-11-02 RX ADMIN — FENOFIBRATE 160 MG: 160 TABLET ORAL at 08:13

## 2023-11-02 RX ADMIN — FUROSEMIDE 40 MG: 10 INJECTION, SOLUTION INTRAMUSCULAR; INTRAVENOUS at 17:17

## 2023-11-02 RX ADMIN — LISINOPRIL 5 MG: 5 TABLET ORAL at 08:16

## 2023-11-02 RX ADMIN — Medication 400 MG: at 08:15

## 2023-11-02 RX ADMIN — FUROSEMIDE 40 MG: 10 INJECTION, SOLUTION INTRAMUSCULAR; INTRAVENOUS at 08:26

## 2023-11-02 RX ADMIN — CYANOCOBALAMIN TAB 500 MCG 500 MCG: 500 TAB at 08:16

## 2023-11-02 RX ADMIN — ESCITALOPRAM OXALATE 20 MG: 10 TABLET ORAL at 08:14

## 2023-11-02 RX ADMIN — MAGNESIUM SULFATE HEPTAHYDRATE 2000 MG: 40 INJECTION, SOLUTION INTRAVENOUS at 17:23

## 2023-11-02 RX ADMIN — ACETAMINOPHEN 650 MG: 325 TABLET ORAL at 06:29

## 2023-11-02 RX ADMIN — Medication 5000 UNITS: at 08:14

## 2023-11-02 RX ADMIN — ROSUVASTATIN 20 MG: 20 TABLET, FILM COATED ORAL at 21:11

## 2023-11-02 RX ADMIN — SPIRONOLACTONE 25 MG: 25 TABLET ORAL at 08:15

## 2023-11-02 RX ADMIN — APIXABAN 5 MG: 5 TABLET, FILM COATED ORAL at 08:16

## 2023-11-02 RX ADMIN — OXYCODONE AND ACETAMINOPHEN 1 TABLET: 325; 5 TABLET ORAL at 14:56

## 2023-11-02 RX ADMIN — AMLODIPINE BESYLATE 5 MG: 5 TABLET ORAL at 08:15

## 2023-11-02 RX ADMIN — FOLIC ACID 1 MG: 1 TABLET ORAL at 08:16

## 2023-11-02 RX ADMIN — LEVOTHYROXINE SODIUM 150 MCG: 75 TABLET ORAL at 06:29

## 2023-11-02 RX ADMIN — METOPROLOL SUCCINATE 100 MG: 50 TABLET, EXTENDED RELEASE ORAL at 21:11

## 2023-11-02 ASSESSMENT — PAIN DESCRIPTION - LOCATION
LOCATION: ABDOMEN
LOCATION: SHOULDER

## 2023-11-02 ASSESSMENT — PAIN - FUNCTIONAL ASSESSMENT
PAIN_FUNCTIONAL_ASSESSMENT: ACTIVITIES ARE NOT PREVENTED
PAIN_FUNCTIONAL_ASSESSMENT: PREVENTS OR INTERFERES SOME ACTIVE ACTIVITIES AND ADLS

## 2023-11-02 ASSESSMENT — PAIN DESCRIPTION - FREQUENCY: FREQUENCY: CONTINUOUS

## 2023-11-02 ASSESSMENT — PAIN DESCRIPTION - ORIENTATION
ORIENTATION: RIGHT
ORIENTATION: LEFT

## 2023-11-02 ASSESSMENT — ENCOUNTER SYMPTOMS
DIARRHEA: 0
ABDOMINAL PAIN: 0
BACK PAIN: 1
NAUSEA: 0
VOMITING: 0
SHORTNESS OF BREATH: 1
GASTROINTESTINAL NEGATIVE: 1

## 2023-11-02 ASSESSMENT — PAIN DESCRIPTION - DESCRIPTORS
DESCRIPTORS: ACHING
DESCRIPTORS: ACHING;THROBBING

## 2023-11-02 ASSESSMENT — PAIN DESCRIPTION - ONSET: ONSET: ON-GOING

## 2023-11-02 ASSESSMENT — PAIN SCALES - GENERAL
PAINLEVEL_OUTOF10: 0
PAINLEVEL_OUTOF10: 7
PAINLEVEL_OUTOF10: 1
PAINLEVEL_OUTOF10: 0
PAINLEVEL_OUTOF10: 2
PAINLEVEL_OUTOF10: 0
PAINLEVEL_OUTOF10: 6

## 2023-11-02 ASSESSMENT — PAIN DESCRIPTION - PAIN TYPE: TYPE: CHRONIC PAIN

## 2023-11-02 NOTE — H&P
[facility]  Office: [address]  1826, DO, Raymond Pap, DO, Essie Hernandesil, DO, Feliz Rae Blood, DO, Merline Mauricio MD, Patrick Ram MD, Jenn Hwang MD, Arcelia Libman, MD,  Renetta Mart MD, Nikita Long MD, Vladimir Cash MD,  Duy Silver MD, Gloria Conklin MD, Guy Ma DO, Mere Estrada MD,  Nina Lopez DO, Garrison Frausto MD, Vernell Cisneros MD, Stephanie Robles MD, Zara Rasheed MD,  Ranjeet Manrique MD, Navin Mulligan MD, Josee Patel MD, Luke Francis MD, Pelon Barry MD, Nat Jackson DO, Pritesh Yin DO, Humaira Mcdermott MD,  Anand Trejo MD, Venkata Roblero, CNP,  Mónica Goode, CNP, Yael Ramos, CNP,  Sameera Santamaria, [facility], Kostas Henning, CNP, Mary Kate Diaz, CNP, Loraine Emanuel, CNP, Gonzales Antony, CNP, Ismael Hyman, CNP, Faby Eldridge, CNP, Ana Ya, CNS, Larisa Nelson, CNP, Jane Parker    HISTORY AND PHYSICAL EXAMINATION            Date:   11/2/2023  Patient name:  Jeff Bell  Date of admission:  11/1/2023  3:05 PM  MRN:   [id number]  Account:  [de-identified]  YOB: 1945  PCP:    ANDERSON Crawford CNP  Room:   317/317-01  Code Status:    DNR-CCA    Chief Complaint:     Chief Complaint   Patient presents with    Shortness of Breath     Pt arrives dt of sob . Pt does have to get thoracentesis for fluid removal. Pt daughter states he has a new company coming to the house and they have not been removing the usual 1 L of fluid . History Obtained From:     patient    History of Present Illness:     Jeff Bell is a 66 y.o. Unavailable / unknown male who presents with Shortness of Breath (Pt arrives dt of sob .  Pt does have to get thoracentesis for fluid removal. Pt daughter states he has a new company coming to the house and they have not been removing the usual 1 L of fluid . )   and is admitted to the medication  Elevated troponin type II MI, patient chest pain-free EKG with no acute changes we will continue to monitor  DVT prophylaxis on Eliquis  PT-OT  Cardiac diet    Consultations:   IP CONSULT TO HEART FAILURE NURSE/COORDINATOR  IP CONSULT TO DIETITIAN  IP CONSULT TO PULMONOLOGY  IP CONSULT TO PALLIATIVE CARE    Patient is admitted as inpatient status because of co-morbidities listed above, severity of signs and symptoms as outlined, requirement for current medical therapies and most importantly because of direct risk to patient if care not provided in a hospital setting. Expected length of stay > 48 hours.     Garcia Wen MD  11/2/2023  1:38 PM    Copy sent to Dr. Earl Weinstein, APRN - CNP

## 2023-11-02 NOTE — PROGRESS NOTES
Physical Therapy  Facility/Department: [facility]  Physical Therapy Initial Assessment    Name: Antonio Ahumada  : 1945  MRN: [id number]  Date of Service: 2023  Chief Complaint   Patient presents with    Shortness of Breath     Pt arrives dt of sob . Pt does have to get thoracentesis for fluid removal. Pt daughter states he has a new company coming to the house and they have not been removing the usual 1 L of fluid . Discharge Recommendations:  Patient would benefit from continued therapy after discharge   PT Equipment Recommendations  Equipment Needed: No  Other: TBD      Patient Diagnosis(es): The primary encounter diagnosis was Pleural effusion on left. Diagnoses of Acute on chronic congestive heart failure, unspecified heart failure type (HCC) and Anemia, unspecified type were also pertinent to this visit. Past Medical History:  has a past medical history of A-fib ([address]), Anxiety, Arrhythmia, Depression, Hyperlipidemia, Hypertension, and Obesity. Past Surgical History:  has a past surgical history that includes joint replacement (Left); Tonsillectomy; and Atrial ablation surgery (). Assessment   Body Structures, Functions, Activity Limitations Requiring Skilled Therapeutic Intervention: Decreased functional mobility ; Decreased body mechanics; Decreased strength;Decreased safe awareness;Decreased endurance;Decreased balance  Assessment: Pt admitted with SOB, fluid overload with pleural effusion. Pt has L sided chest drain which gets drained 2x/wk. Pt has a recent R proximal humerus fx (10/9/2023) and is being treated conservatively; followed up by Dr. Joseph Bal. Pt is NWB RUE with sling and is okay to do pendulum exercises. At home, pt was independent in ambulation with occasional use of furniture for balance; used cane for outdoor ambulation. Pt receives help from family and friends. At time of eval, pt was SBA for bed mobility and CGA for transfers.  Pt was also CGA for Frame for Short Term Goals: 14 days  Short Term Goal 1: Modified independent in bed mobility. Short Term Goal 2: Modified independent in transfers with LRAD. Short Term Goal 3: Modified independent in ambulation x 150 ft with LRAD, RUE sling in place. Short Term Goal 4: Pt to demonstrate good understanding of energy conservation techniques during mobility especially during ambulation with LRAD. Short Term Goal 5: Pt to have at least F+ dynamic standing balance for safe mobility progression. Additional Goals?: Yes  Short Term Goal 6: Pt to negotiate 10 steps with one rail, SBA. Short Term Goal 7: Pt to demonstrate good understanding of pendulum exercises to RUE. Patient Goals   Patient Goals : To get better. Education  Patient Education  Education Given To: Patient  Education Provided: Role of Therapy;Plan of Care;Precautions;Transfer Training;Energy Conservation; Fall Prevention Strategies  Education Provided Comments: pt educated on pacing, proper breathing, use of quad cane, importance of keeping RUE properly in sling for support  Education Method: Demonstration;Verbal  Barriers to Learning: Cognition  Education Outcome: Verbalized understanding;Continued education needed      Therapy Time   Individual Concurrent Group Co-treatment   Time In 1510         Time Out 1545         Minutes 35         Timed Code Treatment Minutes: [address] [facility] [address], PT

## 2023-11-02 NOTE — PLAN OF CARE
Problem: Discharge Planning  Goal: Discharge to home or other facility with appropriate resources  Outcome: Progressing  Flowsheets (Taken 11/2/2023 0421)  Discharge to home or other facility with appropriate resources:   Identify barriers to discharge with patient and caregiver   Arrange for needed discharge resources and transportation as appropriate   Identify discharge learning needs (meds, wound care, etc)     Problem: Pain  Goal: Verbalizes/displays adequate comfort level or baseline comfort level  Outcome: Progressing  Flowsheets (Taken 11/2/2023 0421)  Verbalizes/displays adequate comfort level or baseline comfort level:   Encourage patient to monitor pain and request assistance   Assess pain using appropriate pain scale   Administer analgesics based on type and severity of pain and evaluate response     Problem: Safety - Adult  Goal: Free from fall injury  Outcome: Progressing  Flowsheets (Taken 11/2/2023 0421)  Free From Fall Injury: Instruct family/caregiver on patient safety     Problem: ABCDS Injury Assessment  Goal: Absence of physical injury  Outcome: Progressing  Flowsheets (Taken 11/2/2023 0421)  Absence of Physical Injury: Implement safety measures based on patient assessment

## 2023-11-02 NOTE — PROGRESS NOTES
Orders from pulmology, Dr Marisela Cooper to drain plurex. Patient had daughter to bring in their own drain supplies. Connected patient to system and there was only a dribble of pink fluid with some bubble build-up at top. Upon troubleshooting system, disconnected drain from access tip a huge gush of air and it is now draining but slowly. Updated Dr Marisela Cooper. Ordered STAT CXR portable with draining in process. Patient is tolerated well with no complaints sitting on edge of bed.

## 2023-11-02 NOTE — CONSULTS
11/01/2023    Narrative  EXAMINATION:  ONE XRAY VIEW OF THE CHEST    11/1/2023 4:30 pm    COMPARISON:  June 22, 2020    HISTORY:  ORDERING SYSTEM PROVIDED HISTORY: SOB; hx pleural effusions  TECHNOLOGIST PROVIDED HISTORY:  SOB; hx pleural effusions  Reason for Exam: Shortness of Breath    FINDINGS:  Increased opacity over the left chest as well as loss of volume. A left  chest tube is noted. The right lung and pleural space are clear. Impression  Increased opacity over the left chest and loss of volume likely related to  infiltrates and/or pleural disease. Left chest tube. The right lung  demonstrates no acute disease. MRI Result (most recent):  No results found for this or any previous visit from the past 3650 days. No results found for this or any previous visit. Encounter Date: 11/01/23   EKG 12 Lead   Result Value    Ventricular Rate 79    Atrial Rate 79    P-R Interval 202    QRS Duration 118    Q-T Interval 424    QTc Calculation (Bazett) 486    P Axis -2    R Axis 96    T Axis 1    Narrative    Sinus rhythm with Premature atrial complexes with Aberrant conduction  Rightward axis  Low voltage QRS  Incomplete right bundle branch block  Septal infarct , age undetermined  ST & T wave abnormality, consider anterior ischemia  Abnormal ECG  When compared with ECG of 21-JUN-2020 14:15,  Sinus rhythm has replaced Atrial fibrillation  Incomplete right bundle branch block has replaced Right bundle branch block  Septal infarct is now Present        Code Status: DNR-CCA     ADVANCED CARE PLANNING:  Patient has capacity for medical decisions: yes  Health Care Power of : yes  Living Will: not asked     Personal, Social, and Family History  Marital Status: single  Living situation:alone  Importance of kenisha/Hinduism/spiritual beliefs: [] Very [] Somewhat [] Not   Psychological Distress: mild  Does patient understand diagnosis/treatment? yes  Does caregiver understand diagnosis/treatment?

## 2023-11-02 NOTE — PROGRESS NOTES
Nutrition Note    Consult received. Writer off campus today.  Diet education 11/3/2023    Electronically signed by Brandee Delvalle RD on 11/2/23 at 11:13 AM EDT    NIECY Sanabria, RDN, LD  Registered Dietitian  [facility]  [phone]

## 2023-11-02 NOTE — CONSULTS
Pulmonary/Critical Care consultation    Patient's name:  Natasha Guerrero  Medical Record Number: [id number]  Patient's account/billing number: [de-identified]  Patient's YOB: 1945  Age: 66 y.o. Date of Admission: 11/1/2023  3:05 PM  Date of Consult: 11/2/2023      Primary Care Physician: ANDERSON Desouza CNP      Code Status: DNR-CCA    Reason for consult: Left pleural effusion      HISTORY OF PRESENT ILLNESS:   History was obtained from chart review and the patient. Natasha Guerrero is a 66 y.o. white gentleman with a left pleural effusion for the past 4 months for which the patient has had pleural drainage catheter and requiring drainage twice a week. He was informed that the pleural effusion was related to heart failure. Review of the echocardiogram done on 8/8/2023 shows he has a normal ejection fraction.   There was no comment regarding diastolic heart failure  Review of the patient's records show that at [facility] he was diagnosed with diastolic congestive heart failure  Patient with history of cardiac surgery in 2020 and 2021  No history of malignancy  No hypothyroidism  No exposure to tuberculosis, asbestos  Not on any medication that could lead to pleural effusion  No history of DVT or pulmonary embolism  Patient with known history of atrial fibrillation status post ablation few years ago and on examination he appears to be in a regular rhythm  No trauma to the chest  No fever chills or night sweats  No weight loss or loss of appetite  Pleural fluid analysis showed the pleural effusion to be a transudate with 83% lymphocytes and cytology being negative for malignancy  Patient apparently has home care company that is been helping remove the fluid twice a week, but the company has changed recently and according to the patient he feels the new nursing staff has not been an adequate drainage hence leading to his symptoms          Past Medical History: \"LDLCALC\" in the last 72 hours. Blood Gases: No results found for: \"PH\", \"PCO2\", \"PO2\", \"HCO3\", \"O2SAT\"  Thyroid functions:   Lab Results   Component Value Date/Time    TSH 4.33 06/06/2023 12:00 AM            Microbiology:    Cultures during this admission:     Blood cultures:      [x] None drawn      [] Negative             []  Positive (Details:  )  Urine Culture:        [x] None drawn      [] Negative             []  Positive (Details:  )  Sputum Culture:   [x] None drawn       [] Negative             []  Positive (Details:  )     Pulmonary function tests: none    Radiological reports:    XR CHEST PORTABLE    Result Date: 11/2/2023  Increased opacity over the left chest and loss of volume likely related to infiltrates and/or pleural disease. Left chest tube. The right lung demonstrates no acute disease. Assessment and Plan       IMPRESSION:   1. Pleural effusion on left    2. Acute on chronic congestive heart failure, unspecified heart failure type ([address])    3. Anemia, unspecified type        Principal Problem:    Pleural effusion  Resolved Problems:    * No resolved hospital problems. *  The cause of the patient's recurrent left pleural effusion, a transudate could be related to chronic diastolic congestive heart failure. No other cause is evident and patient has had this effusion for more than 3 years  History of alcohol abuse in the past  Paroxysmal atrial fibrillation s/p ablation  Reported chronic systolic congestive heart failure, but recent echocardiogram did not show that  Obesity  History of essential hypertension  Anemia, macrocytic    PLAN:       Discussed with the primary service. Patient to have the pleural fluid drained from the left hemithorax via the pleural drainage catheter  Continue to optimize Lasix therapy while watching the kidney function  Monitor daily weights and electrolytes  Recommend flu vaccination in the fall annually.   Recommendations given regarding pneumococcal

## 2023-11-03 ENCOUNTER — HOSPITAL ENCOUNTER (OUTPATIENT)
Dept: INTERVENTIONAL RADIOLOGY/VASCULAR | Age: 78
End: 2023-11-03
Payer: MEDICARE

## 2023-11-03 LAB
ANION GAP SERPL CALCULATED.3IONS-SCNC: 8 MMOL/L (ref 9–17)
BUN SERPL-MCNC: 33 MG/DL (ref 8–23)
CALCIUM SERPL-MCNC: 9 MG/DL (ref 8.6–10.4)
CHLORIDE SERPL-SCNC: 101 MMOL/L (ref 98–107)
CO2 SERPL-SCNC: 27 MMOL/L (ref 20–31)
CREAT SERPL-MCNC: 1 MG/DL (ref 0.7–1.2)
GFR SERPL CREATININE-BSD FRML MDRD: >60 ML/MIN/1.73M2
GLUCOSE SERPL-MCNC: 115 MG/DL (ref 70–99)
MAGNESIUM SERPL-MCNC: 1.7 MG/DL (ref 1.6–2.6)
POTASSIUM SERPL-SCNC: 4.5 MMOL/L (ref 3.7–5.3)
SODIUM SERPL-SCNC: 136 MMOL/L (ref 135–144)

## 2023-11-03 PROCEDURE — 6360000002 HC RX W HCPCS: Performed by: RADIOLOGY

## 2023-11-03 PROCEDURE — 32561 LYSE CHEST FIBRIN INIT DAY: CPT

## 2023-11-03 PROCEDURE — 6370000000 HC RX 637 (ALT 250 FOR IP): Performed by: STUDENT IN AN ORGANIZED HEALTH CARE EDUCATION/TRAINING PROGRAM

## 2023-11-03 PROCEDURE — 6360000002 HC RX W HCPCS: Performed by: HOSPITALIST

## 2023-11-03 PROCEDURE — 3E0L3GC INTRODUCTION OF OTHER THERAPEUTIC SUBSTANCE INTO PLEURAL CAVITY, PERCUTANEOUS APPROACH: ICD-10-PCS | Performed by: INTERNAL MEDICINE

## 2023-11-03 PROCEDURE — 1200000000 HC SEMI PRIVATE

## 2023-11-03 PROCEDURE — 99233 SBSQ HOSP IP/OBS HIGH 50: CPT | Performed by: INTERNAL MEDICINE

## 2023-11-03 PROCEDURE — 2580000003 HC RX 258: Performed by: RADIOLOGY

## 2023-11-03 PROCEDURE — 99231 SBSQ HOSP IP/OBS SF/LOW 25: CPT | Performed by: STUDENT IN AN ORGANIZED HEALTH CARE EDUCATION/TRAINING PROGRAM

## 2023-11-03 PROCEDURE — 36415 COLL VENOUS BLD VENIPUNCTURE: CPT

## 2023-11-03 PROCEDURE — 6370000000 HC RX 637 (ALT 250 FOR IP): Performed by: HOSPITALIST

## 2023-11-03 PROCEDURE — 83735 ASSAY OF MAGNESIUM: CPT

## 2023-11-03 PROCEDURE — 0W9B30Z DRAINAGE OF LEFT PLEURAL CAVITY WITH DRAINAGE DEVICE, PERCUTANEOUS APPROACH: ICD-10-PCS | Performed by: INTERNAL MEDICINE

## 2023-11-03 PROCEDURE — 80048 BASIC METABOLIC PNL TOTAL CA: CPT

## 2023-11-03 PROCEDURE — 2580000003 HC RX 258: Performed by: HOSPITALIST

## 2023-11-03 RX ADMIN — AMLODIPINE BESYLATE 5 MG: 5 TABLET ORAL at 08:16

## 2023-11-03 RX ADMIN — APIXABAN 5 MG: 5 TABLET, FILM COATED ORAL at 08:16

## 2023-11-03 RX ADMIN — OXYCODONE AND ACETAMINOPHEN 1 TABLET: 325; 5 TABLET ORAL at 01:04

## 2023-11-03 RX ADMIN — ROSUVASTATIN 20 MG: 20 TABLET, FILM COATED ORAL at 21:26

## 2023-11-03 RX ADMIN — CYANOCOBALAMIN TAB 500 MCG 500 MCG: 500 TAB at 08:16

## 2023-11-03 RX ADMIN — METOPROLOL SUCCINATE 100 MG: 50 TABLET, EXTENDED RELEASE ORAL at 21:26

## 2023-11-03 RX ADMIN — LISINOPRIL 5 MG: 5 TABLET ORAL at 08:16

## 2023-11-03 RX ADMIN — FOLIC ACID 1 MG: 1 TABLET ORAL at 08:15

## 2023-11-03 RX ADMIN — SPIRONOLACTONE 25 MG: 25 TABLET ORAL at 08:15

## 2023-11-03 RX ADMIN — FENOFIBRATE 160 MG: 160 TABLET ORAL at 08:15

## 2023-11-03 RX ADMIN — Medication 400 MG: at 08:16

## 2023-11-03 RX ADMIN — SODIUM CHLORIDE, PRESERVATIVE FREE 10 ML: 5 INJECTION INTRAVENOUS at 21:26

## 2023-11-03 RX ADMIN — APIXABAN 5 MG: 5 TABLET, FILM COATED ORAL at 21:26

## 2023-11-03 RX ADMIN — SODIUM CHLORIDE, PRESERVATIVE FREE 10 ML: 5 INJECTION INTRAVENOUS at 08:16

## 2023-11-03 RX ADMIN — Medication 5000 UNITS: at 08:15

## 2023-11-03 RX ADMIN — FUROSEMIDE 40 MG: 10 INJECTION, SOLUTION INTRAMUSCULAR; INTRAVENOUS at 17:53

## 2023-11-03 RX ADMIN — LEVOTHYROXINE SODIUM 150 MCG: 75 TABLET ORAL at 06:12

## 2023-11-03 RX ADMIN — FUROSEMIDE 40 MG: 10 INJECTION, SOLUTION INTRAMUSCULAR; INTRAVENOUS at 14:46

## 2023-11-03 RX ADMIN — OXYCODONE AND ACETAMINOPHEN 1 TABLET: 325; 5 TABLET ORAL at 17:50

## 2023-11-03 RX ADMIN — ALTEPLASE 5 MG: 2.2 INJECTION, POWDER, LYOPHILIZED, FOR SOLUTION INTRAVENOUS at 10:19

## 2023-11-03 RX ADMIN — DORNASE ALFA 5 MG: 1 SOLUTION RESPIRATORY (INHALATION) at 10:19

## 2023-11-03 RX ADMIN — ESCITALOPRAM OXALATE 20 MG: 10 TABLET ORAL at 08:15

## 2023-11-03 ASSESSMENT — ENCOUNTER SYMPTOMS
GASTROINTESTINAL NEGATIVE: 1
BACK PAIN: 1

## 2023-11-03 ASSESSMENT — PAIN DESCRIPTION - DESCRIPTORS
DESCRIPTORS: BURNING;DISCOMFORT
DESCRIPTORS: DISCOMFORT

## 2023-11-03 ASSESSMENT — PAIN SCALES - GENERAL
PAINLEVEL_OUTOF10: 5
PAINLEVEL_OUTOF10: 10

## 2023-11-03 ASSESSMENT — PAIN DESCRIPTION - LOCATION
LOCATION: ARM;RIB CAGE
LOCATION: GENERALIZED

## 2023-11-03 ASSESSMENT — PAIN DESCRIPTION - ORIENTATION
ORIENTATION: MID
ORIENTATION: RIGHT;LEFT

## 2023-11-03 NOTE — PROGRESS NOTES
TPA and Dornase indwelled for 3 hours  Connected to vacu bottle and   Drained easily 650ml of dark red fluid  Site cleaned and new dressing applied  PCXR completed and reviewed per Dr Delores Farfan repeat TPA and Dornase injection tomorrow   Will order for the am    [facility] transport notified pt is ready for return   Report called to bedside Rn, [address] [facility].   Will PS Dr Christoph Eckert to notify of above

## 2023-11-03 NOTE — CONSULTS
Nutrition Note    Unable to complete diet education this morning as patient is at [facility] for procedure. Will follow up with patient for HF diet education upon their return to facility today.      Electronically signed by Tee Graham RD on 11/3/23 at 2:47 PM EDT    NIECY Baldwin, RDN, LD  Registered [address] [place] [address] [address]  [phone]

## 2023-11-03 NOTE — PROGRESS NOTES
Physical Therapy        Physical Therapy Cancel Note      DATE: 11/3/2023    NAME: Antonio Ahumada  MRN: [id number]   : 1945      Patient not seen this date for Physical Therapy due to:    Surgery/Procedure: Pt just left for [facility] for L pleural drain assessment. Will continue to pursue PT as appropriate.        Electronically signed by Harpreet Swann on 11/3/2023 at 8:43 AM

## 2023-11-03 NOTE — CARE COORDINATION
Case Management Assessment  Initial Evaluation    Date/Time of Evaluation: 11/3/2023 6:17 PM  Assessment Completed by: Jodee Blackmon RN    If patient is discharged prior to next notation, then this note serves as note for discharge by case management. Patient Name: Mc Coelho                   YOB: 1945  Diagnosis: Pleural effusion [J90]                   Date / Time: 11/1/2023  3:05 PM    Patient Admission Status: Inpatient   Readmission Risk (Low < 19, Mod (19-27), High > 27): Readmission Risk Score: 14.4    Current PCP: ANDERSON Branham CNP  PCP verified by CM? Yes    Chart Reviewed: Yes      History Provided by: Patient  Patient Orientation: Alert and Oriented    Patient Cognition: Alert    Hospitalization in the last 30 days (Readmission):  No    If yes, Readmission Assessment in CM Navigator will be completed. Advance Directives:      Code Status: DNR-CCA   Patient's Primary Decision Maker is:      Primary Decision MakerSanjeev [facility] - [phone]    Discharge Planning:    Patient lives with: Alone Type of Home: House  Primary Care Giver: Self  Patient Support Systems include: Children   Current Financial resources: Medicare  Current community resources:    Current services prior to admission: Durable Medical Equipment            Current DME: Shower Chair, Walker, Cane (toliet riser)            Type of Home Care services:  Nursing Services    ADLS  Prior functional level: Assistance with the following:, Housework, Cooking  Current functional level: Independent in ADLs/IADLs, Cooking, Housework    PT AM-PAC: 16 /24  OT AM-PAC:   /24    Family can provide assistance at DC: Yes  Would you like Case Management to discuss the discharge plan with any other family members/significant others, and if so, who?  No  Plans to Return to Present Housing: No  Other Identified Issues/Barriers to RETURNING to current housing:none  Potential Assistance needed at discharge: N/A Potential DME:    Patient expects to discharge to: [address] for transportation at discharge: Waqas Navarro    Payor: Izabela Hebert / Plan: MEDICARE PART A AND B / Product Type: *No Product type* /     Does insurance require precert for SNF: No    Potential assistance Purchasing Medications:    Meds-to-Beds request: Maryann      [id number] [id number] - [place], [place] - [address]  [address] [facility] [address]  Phone: [phone] Fax: [phone]    [address] [place] [address] [address] [place] [address] [address] [facility] [phone] - f [phone]  [address] [facility] [address]  Phone: [phone] Fax: [phone]    [address] [phone] Alexander Ortiz [phone]  [place] [address]  Phone: [phone] Fax: [phone]      Notes:    Factors facilitating achievement of predicted outcomes: Family support, Motivated, Cooperative, Pleasant, and Has needed Durable Medical Equipment at home    Barriers to discharge: Medical complications    Additional Case Management Notes: Plan is MACK Alegria  Patient and daughter Zari Clay in agreement. Has pluerax drain  Daughter will bring  POA paperwork  Trust  : Kwaku Figueroa    The Plan for Transition of Care is related to the following treatment goals of Pleural effusion [A38]    IF APPLICABLE: The Patient and/or patient representative Mariann Hill and his family were provided with a choice of provider and agrees with the discharge plan. Freedom of choice list with basic dialogue that supports the patient's individualized plan of care/goals and shares the quality data associated with the providers was provided to:     Patient Representative Name:       The Patient and/or Patient Representative Agree with the Discharge Plan?       Sofie Villegas RN  Case Management Department  Ph:    Fax:

## 2023-11-03 NOTE — PROGRESS NOTES
history:  11/3/2023      Pleurx without much drainage  Discussed with interventional radiology on the telephone  [facility] the Pleurx catheter patency  Patient did end up needing tPA and dornase with improvement in the drainage  Patient to receive tPA with dornase tomorrow again  Patient feels slight improvement  No fever  No hemoptysis  Has a good appetite  Improving shortness of breath      Past Medical History:        Diagnosis Date    A-fib ([address])     Anxiety     Arrhythmia     Depression     Hyperlipidemia     Hypertension     Obesity        Past Surgical History:        Procedure Laterality Date    ATRIAL ABLATION SURGERY  2013    JOINT REPLACEMENT Left     TONSILLECTOMY         Allergies:    No Known Allergies      Home Meds:   Prior to Admission medications    Medication Sig Start Date End Date Taking?  Authorizing Provider   vitamin D (CHOLECALCIFEROL) 125 MCG (5000 UT) CAPS capsule Take 1 capsule by mouth daily    ProviderJavier MD   spironolactone (ALDACTONE) 25 MG tablet Take 1 tablet by mouth daily    Javier Garzon MD   metoprolol succinate (TOPROL XL) 100 MG extended release tablet Take 1 tablet by mouth nightly 8/25/23 2/21/24  Tj Mac MD   folic acid (FOLVITE) 1 MG tablet Take 1 tablet by mouth daily 8/25/23 2/21/24  Tj Mac MD   fenofibrate (TRIGLIDE) 160 MG tablet Take 1 tablet by mouth daily 8/25/23 2/21/24  Tj Mac MD   amLODIPine (NORVASC) 5 MG tablet Take 1 tablet by mouth daily 8/25/23 2/21/24  Tj Mac MD   rosuvastatin (CRESTOR) 20 MG tablet Take 1 tablet by mouth daily 8/25/23 2/21/24  Tj Mac MD   magnesium oxide (MAG-OX) 400 MG tablet Take 1 tablet by mouth daily 8/25/23 2/21/24  Tj Mac MD   escitalopram (LEXAPRO) 20 MG tablet Take 1 tablet by mouth daily 6/15/23 6/14/24  Claudell Garden, APRN fibrillation s/p ablation  Reported chronic systolic congestive heart failure, but recent echocardiogram did not show that  Obesity  History of essential hypertension  Anemia, macrocytic    PLAN:       Patient to have interventional radiology evaluate the Pleurx catheter for its not draining. I called and talked to the IR department [facility]. Yunier's and I put in an order for the evaluation  Continue to optimize Lasix therapy while watching the kidney function  Monitor daily weights and electrolytes  Recommend flu vaccination in the fall annually. Recommendations given regarding pneumococcal vaccinations. Recommend COVID-19 vaccination  All the questions that the daughter and patient has had were answered to   their satisfaction. Home O2 evaluation was done. Supplemental oxygen was  to be continued  Patient was informed of the need for using oxygen. Patient was educated on the importance of compliance and the benefits of oxygen use. Complications of oxygen use, including dryness of the nostrils, epistaxis and also the fire hazard were explained to the patient. Patient willingly accepts to use  the oxygen as recommended. Chest x-ray was reviewed and discussed. After reviewing the patient's smoking history and his age patient does not meet the criteria for lung cancer screening. Thank you for having us involved in the care of your patient. Please call us if you have any questions or concerns.       Martha Eason MD, M.D.            11/3/2023, 7:00 AM

## 2023-11-03 NOTE — CARE COORDINATION
[address], [address] [place] [address]     Information contained in this transmission is for the sole use of the intended recipient(s) and may contain confidential and privileged information. Any  unauthorized review, use, disclosure or distribution is prohibited. If you are not the intended recipient, please contact the sender for further  instructions regarding the information.      /: Pratik Bear  Phone Number: [phone]  SSN:

## 2023-11-03 NOTE — PROGRESS NOTES
[facility]  PROGRESS NOTE    Room # 317/317-01   Name: Jacquie Young            Religious: Silva Gomze     Reason for visit: Routine    I visited the patient. Admit Date & Time: 11/1/2023  3:05 PM    Assessment:  Jacquie Young is a 66 y.o. male in the hospital. Upon entering the room Writer observed Patient was sitting at the side of his bed. Patient smiled and greeted Mary Lucero, welcoming her visit. Patient shared how his procedure went and how many he has had in the past several months. He noted his recent fall and shoulder injury. Patient spoke of his kenisha and Oriental orthodox affiliation and affirmed that his kenisha gives him strength. Patient shared about his life, including the work he did, his relationships, his children, his parents, and his siblings. Pt acknowledged that he is aware that others have it much worse than he does. Pt shared that his father's struggles give him strength to deal with his challenges now. Pt affirmed that he is not giving up. Pt shared that he misses work, as he enjoys people. Pt is looking for a [employer]oration[employer] to attend. Pt was receptive to prayer and voiced his prayers for his brother and sister-in-law with whom he is close. Intervention:  I introduced myself and my title as . I offered space for Patient  to express feelings, needs, and concerns and provided a ministry presence. Writer facilitated story telling. Writer inquired about Pt's sources of support and strength. Writer affirmed Pt's strengths. Writer explored Pt's attitudes and feelings about his situation. Writer prayed for and with Pt. Outcome:  Patient spoke of who and what matters and has inspired him. Patient voiced his hopes, needs, and concerns. Pt welcomed and joined writer in prayer. Pt reaffirmed his desire for a Restorationism community where he can attend in person. Pt thanked writer for the visit and welcomed her to return.     Plan:  Chaplains will remain available to offer

## 2023-11-03 NOTE — PROGRESS NOTES
Patient was up at bedside at shift report, no complaints of pain, no signs of respiratory distress, pluerex drain was draining minimally and then was clamped at 1920, cxr results were ,elo available at [address], multiple attempts to perfectserve pulmonology with no capability to do so, NP covering for hospitalist was notified and updated on patients condition, no further orders. Patient is up at bedside eating and drinking without difficulty, vitals stable.

## 2023-11-03 NOTE — PROGRESS NOTES
Patient to be transferred to [facility] for IR to interrogate the PleurX drain placement and to replace if needed. Patient updated of transport time in 45 minutes. Requested of patient to not eat breakfast in case of emergency during placement. Narda Led from IR updated.

## 2023-11-04 LAB
ANION GAP SERPL CALCULATED.3IONS-SCNC: 7 MMOL/L (ref 9–17)
BNP SERPL-MCNC: 3588 PG/ML
BUN SERPL-MCNC: 37 MG/DL (ref 8–23)
CALCIUM SERPL-MCNC: 9.2 MG/DL (ref 8.6–10.4)
CHLORIDE SERPL-SCNC: 102 MMOL/L (ref 98–107)
CO2 SERPL-SCNC: 28 MMOL/L (ref 20–31)
CREAT SERPL-MCNC: 1.1 MG/DL (ref 0.7–1.2)
GFR SERPL CREATININE-BSD FRML MDRD: >60 ML/MIN/1.73M2
GLUCOSE SERPL-MCNC: 99 MG/DL (ref 70–99)
MAGNESIUM SERPL-MCNC: 1.7 MG/DL (ref 1.6–2.6)
POTASSIUM SERPL-SCNC: 4.8 MMOL/L (ref 3.7–5.3)
SODIUM SERPL-SCNC: 137 MMOL/L (ref 135–144)

## 2023-11-04 PROCEDURE — 1200000000 HC SEMI PRIVATE

## 2023-11-04 PROCEDURE — 6360000002 HC RX W HCPCS: Performed by: HOSPITALIST

## 2023-11-04 PROCEDURE — 6370000000 HC RX 637 (ALT 250 FOR IP): Performed by: HOSPITALIST

## 2023-11-04 PROCEDURE — 80048 BASIC METABOLIC PNL TOTAL CA: CPT

## 2023-11-04 PROCEDURE — 99231 SBSQ HOSP IP/OBS SF/LOW 25: CPT | Performed by: STUDENT IN AN ORGANIZED HEALTH CARE EDUCATION/TRAINING PROGRAM

## 2023-11-04 PROCEDURE — 97535 SELF CARE MNGMENT TRAINING: CPT

## 2023-11-04 PROCEDURE — 83880 ASSAY OF NATRIURETIC PEPTIDE: CPT

## 2023-11-04 PROCEDURE — 97166 OT EVAL MOD COMPLEX 45 MIN: CPT

## 2023-11-04 PROCEDURE — 36415 COLL VENOUS BLD VENIPUNCTURE: CPT

## 2023-11-04 PROCEDURE — 6370000000 HC RX 637 (ALT 250 FOR IP): Performed by: STUDENT IN AN ORGANIZED HEALTH CARE EDUCATION/TRAINING PROGRAM

## 2023-11-04 PROCEDURE — 83735 ASSAY OF MAGNESIUM: CPT

## 2023-11-04 PROCEDURE — 99233 SBSQ HOSP IP/OBS HIGH 50: CPT | Performed by: INTERNAL MEDICINE

## 2023-11-04 PROCEDURE — 2580000003 HC RX 258: Performed by: HOSPITALIST

## 2023-11-04 RX ADMIN — SODIUM CHLORIDE, PRESERVATIVE FREE 10 ML: 5 INJECTION INTRAVENOUS at 21:22

## 2023-11-04 RX ADMIN — FUROSEMIDE 40 MG: 10 INJECTION, SOLUTION INTRAMUSCULAR; INTRAVENOUS at 17:46

## 2023-11-04 RX ADMIN — OXYCODONE AND ACETAMINOPHEN 1 TABLET: 325; 5 TABLET ORAL at 09:25

## 2023-11-04 RX ADMIN — APIXABAN 5 MG: 5 TABLET, FILM COATED ORAL at 09:26

## 2023-11-04 RX ADMIN — CYANOCOBALAMIN TAB 500 MCG 500 MCG: 500 TAB at 09:25

## 2023-11-04 RX ADMIN — SPIRONOLACTONE 25 MG: 25 TABLET ORAL at 09:25

## 2023-11-04 RX ADMIN — Medication 400 MG: at 09:25

## 2023-11-04 RX ADMIN — SODIUM CHLORIDE, PRESERVATIVE FREE 10 ML: 5 INJECTION INTRAVENOUS at 09:27

## 2023-11-04 RX ADMIN — Medication 5000 UNITS: at 09:25

## 2023-11-04 RX ADMIN — FUROSEMIDE 40 MG: 10 INJECTION, SOLUTION INTRAMUSCULAR; INTRAVENOUS at 09:26

## 2023-11-04 RX ADMIN — OXYCODONE AND ACETAMINOPHEN 1 TABLET: 325; 5 TABLET ORAL at 17:57

## 2023-11-04 RX ADMIN — ESCITALOPRAM OXALATE 20 MG: 10 TABLET ORAL at 09:25

## 2023-11-04 RX ADMIN — AMLODIPINE BESYLATE 5 MG: 5 TABLET ORAL at 09:25

## 2023-11-04 RX ADMIN — FENOFIBRATE 160 MG: 160 TABLET ORAL at 09:25

## 2023-11-04 RX ADMIN — LEVOTHYROXINE SODIUM 150 MCG: 75 TABLET ORAL at 06:52

## 2023-11-04 RX ADMIN — FOLIC ACID 1 MG: 1 TABLET ORAL at 09:26

## 2023-11-04 RX ADMIN — LISINOPRIL 5 MG: 5 TABLET ORAL at 09:25

## 2023-11-04 RX ADMIN — APIXABAN 5 MG: 5 TABLET, FILM COATED ORAL at 21:21

## 2023-11-04 RX ADMIN — ROSUVASTATIN 20 MG: 20 TABLET, FILM COATED ORAL at 21:21

## 2023-11-04 RX ADMIN — METOPROLOL SUCCINATE 100 MG: 50 TABLET, EXTENDED RELEASE ORAL at 21:21

## 2023-11-04 ASSESSMENT — ENCOUNTER SYMPTOMS
VOMITING: 0
GASTROINTESTINAL NEGATIVE: 1
SHORTNESS OF BREATH: 1
BACK PAIN: 0
ABDOMINAL PAIN: 0
VOICE CHANGE: 0
BACK PAIN: 1
NAUSEA: 0

## 2023-11-04 ASSESSMENT — PAIN SCALES - GENERAL
PAINLEVEL_OUTOF10: 5
PAINLEVEL_OUTOF10: 7
PAINLEVEL_OUTOF10: 5
PAINLEVEL_OUTOF10: 7
PAINLEVEL_OUTOF10: 5

## 2023-11-04 ASSESSMENT — PAIN DESCRIPTION - ORIENTATION
ORIENTATION: RIGHT
ORIENTATION: RIGHT

## 2023-11-04 ASSESSMENT — PAIN DESCRIPTION - LOCATION
LOCATION: SHOULDER;TEETH
LOCATION: ARM

## 2023-11-04 NOTE — PLAN OF CARE
Problem: Discharge Planning  Goal: Discharge to home or other facility with appropriate resources  Outcome: Progressing     Problem: Pain  Goal: Verbalizes/displays adequate comfort level or baseline comfort level  Outcome: Progressing     Problem: Safety - Adult  Goal: Free from fall injury  Outcome: Progressing     Problem: ABCDS Injury Assessment  Goal: Absence of physical injury  Outcome: Progressing     Problem: Chronic Conditions and Co-morbidities  Goal: Patient's chronic conditions and co-morbidity symptoms are monitored and maintained or improved  Outcome: Progressing     Problem: Neurosensory - Adult  Goal: Achieves stable or improved neurological status  Outcome: Progressing     Problem: Respiratory - Adult  Goal: Achieves optimal ventilation and oxygenation  Outcome: Progressing     Problem: Cardiovascular - Adult  Goal: Maintains optimal cardiac output and hemodynamic stability  Outcome: Progressing     Problem: Skin/Tissue Integrity - Adult  Goal: Skin integrity remains intact  Outcome: Progressing     Problem: Musculoskeletal - Adult  Goal: Return mobility to safest level of function  Outcome: Progressing     Problem: Gastrointestinal - Adult  Goal: Minimal or absence of nausea and vomiting  Outcome: Progressing     Problem: Genitourinary - Adult  Goal: Absence of urinary retention  Outcome: Progressing     Problem: Infection - Adult  Goal: Absence of infection at discharge  Outcome: Progressing     Problem: Metabolic/Fluid and Electrolytes - Adult  Goal: Electrolytes maintained within normal limits  Outcome: Progressing     Problem: Hematologic - Adult  Goal: Maintains hematologic stability  Outcome: Progressing     Problem: Skin/Tissue Integrity  Goal: Absence of new skin breakdown  Description: 1. Monitor for areas of redness and/or skin breakdown  2. Assess vascular access sites hourly  3. Every 4-6 hours minimum:  Change oxygen saturation probe site  4.   Every 4-6 hours:  If on nasal

## 2023-11-04 NOTE — PROGRESS NOTES
PULMONARY & CRITICAL CARE MEDICINE PROGRESS NOTE     Patient:  Stiven Fernando  MRN: [id number]  [facility] date: 11/1/2023  Primary Care Physician: ANDERSON Reynolds CNP  Consulting Physician: Julia Disla*  CODE Status: DNR-CCA  LOS: 3     SUBJECTIVE     CHIEF COMPLAINT/REASON FOR INITIAL CONSULT:Shortness of Breath (Pt arrives dt of sob . Pt does have to get thoracentesis for fluid removal. Pt daughter states he has a new company coming to the house and they have not been removing the usual 1 L of fluid . )    Olaf:  The patient is a 66 y.o. male with a left pleural effusion for the past 4 months for which the patient has had pleural drainage catheter and requiring drainage twice a week. He was informed that the pleural effusion was related to heart failure. Review of the echocardiogram done on 8/8/2023 shows he has a normal ejection fraction. There was no comment regarding diastolic heart failure.   Review of the patient's records show that at [facility] he was diagnosed with diastolic congestive heart failure  Patient with history of cardiac surgery in 2020 and 2021  No history of malignancy  No hypothyroidism  No exposure to tuberculosis, asbestos  Not on any medication that could lead to pleural effusion  No history of DVT or pulmonary embolism  Patient with known history of atrial fibrillation status post ablation few years ago and on examination he appears to be in a regular rhythm  No trauma to the chest  No fever chills or night sweats  No weight loss or loss of appetite  Pleural fluid analysis showed the pleural effusion to be a transudate with 83% lymphocytes and cytology being negative for malignancy  Patient apparently has home care company that is been helping remove the fluid twice a week, but the company has changed recently and according to the patient he feels the new nursing staff has not been an adequate drainage hence leading to his symptoms    INTERVAL

## 2023-11-04 NOTE — PROGRESS NOTES
Occupational Therapy  Facility/Department: [facility]  Occupational Therapy Initial Assessment    Name: Jina Box  : 1945  MRN: [id number]  Date of Service: 2023    Discharge Recommendations:  Patient would benefit from continued therapy after discharge  OT Equipment Recommendations  Other: continue to assess     Chief Complaint   Patient presents with    Shortness of Breath     Pt arrives dt of sob . Pt does have to get thoracentesis for fluid removal. Pt daughter states he has a new company coming to the house and they have not been removing the usual 1 L of fluid . Patient Diagnosis(es): The primary encounter diagnosis was Pleural effusion on left. Diagnoses of Acute on chronic congestive heart failure, unspecified heart failure type (HCC) and Anemia, unspecified type were also pertinent to this visit. Past Medical History:  has a past medical history of A-fib ([address]), Anxiety, Arrhythmia, Depression, Hyperlipidemia, Hypertension, and Obesity. Past Surgical History:  has a past surgical history that includes joint replacement (Left); Tonsillectomy; and Atrial ablation surgery (). Assessment   Performance deficits / Impairments: Decreased functional mobility ; Decreased safe awareness;Decreased balance;Decreased ADL status; Decreased strength  Assessment: Patient demonstrated decreased ADLs, balance and functional mobility due to hospitalization with SOB, pleural effusion and recent R humerus fracture. Patient would benefit from skilled OT while at hospital addressing above deficits to maximize safety and independence. Patient is currently unsafe to return to prior living arrangements at this time and woud benefit from skilled OT following discharge.   Prognosis: Good  Decision Making: Medium Complexity  REQUIRES OT FOLLOW-UP: Yes  Activity Tolerance  Activity Tolerance: Patient Tolerated treatment well;Patient limited by pain  Activity Tolerance Comments:

## 2023-11-05 ENCOUNTER — APPOINTMENT (OUTPATIENT)
Dept: GENERAL RADIOLOGY | Age: 78
End: 2023-11-05
Payer: MEDICARE

## 2023-11-05 LAB
ANION GAP SERPL CALCULATED.3IONS-SCNC: 10 MMOL/L (ref 9–17)
BUN SERPL-MCNC: 51 MG/DL (ref 8–23)
CALCIUM SERPL-MCNC: 9 MG/DL (ref 8.6–10.4)
CHLORIDE SERPL-SCNC: 99 MMOL/L (ref 98–107)
CO2 SERPL-SCNC: 26 MMOL/L (ref 20–31)
CREAT SERPL-MCNC: 1.6 MG/DL (ref 0.7–1.2)
ERYTHROCYTE [DISTWIDTH] IN BLOOD BY AUTOMATED COUNT: 14.3 % (ref 12.5–15.4)
GFR SERPL CREATININE-BSD FRML MDRD: 44 ML/MIN/1.73M2
GLUCOSE SERPL-MCNC: 102 MG/DL (ref 70–99)
HCT VFR BLD AUTO: 27.3 % (ref 41–53)
HGB BLD-MCNC: 9.2 G/DL (ref 13.5–17.5)
LACTATE BLDV-SCNC: 2.8 MMOL/L (ref 0.5–2.2)
MAGNESIUM SERPL-MCNC: 1.7 MG/DL (ref 1.6–2.6)
MCH RBC QN AUTO: 36.8 PG (ref 26–34)
MCHC RBC AUTO-ENTMCNC: 33.7 G/DL (ref 31–37)
MCV RBC AUTO: 109.2 FL (ref 80–100)
PLATELET # BLD AUTO: 437 K/UL (ref 140–450)
PMV BLD AUTO: 8.4 FL (ref 6–12)
POTASSIUM SERPL-SCNC: 5.3 MMOL/L (ref 3.7–5.3)
PROCALCITONIN SERPL-MCNC: 0.54 NG/ML
RBC # BLD AUTO: 2.5 M/UL (ref 4.5–5.9)
SODIUM SERPL-SCNC: 135 MMOL/L (ref 135–144)
WBC OTHER # BLD: 14.9 K/UL (ref 3.5–11)

## 2023-11-05 PROCEDURE — 6370000000 HC RX 637 (ALT 250 FOR IP): Performed by: HOSPITALIST

## 2023-11-05 PROCEDURE — 2580000003 HC RX 258: Performed by: HOSPITALIST

## 2023-11-05 PROCEDURE — 71045 X-RAY EXAM CHEST 1 VIEW: CPT

## 2023-11-05 PROCEDURE — 80048 BASIC METABOLIC PNL TOTAL CA: CPT

## 2023-11-05 PROCEDURE — 99232 SBSQ HOSP IP/OBS MODERATE 35: CPT | Performed by: STUDENT IN AN ORGANIZED HEALTH CARE EDUCATION/TRAINING PROGRAM

## 2023-11-05 PROCEDURE — 97530 THERAPEUTIC ACTIVITIES: CPT

## 2023-11-05 PROCEDURE — 85027 COMPLETE CBC AUTOMATED: CPT

## 2023-11-05 PROCEDURE — 84145 PROCALCITONIN (PCT): CPT

## 2023-11-05 PROCEDURE — 1200000000 HC SEMI PRIVATE

## 2023-11-05 PROCEDURE — 6370000000 HC RX 637 (ALT 250 FOR IP): Performed by: STUDENT IN AN ORGANIZED HEALTH CARE EDUCATION/TRAINING PROGRAM

## 2023-11-05 PROCEDURE — 6370000000 HC RX 637 (ALT 250 FOR IP): Performed by: NURSE PRACTITIONER

## 2023-11-05 PROCEDURE — 99233 SBSQ HOSP IP/OBS HIGH 50: CPT | Performed by: INTERNAL MEDICINE

## 2023-11-05 PROCEDURE — 97110 THERAPEUTIC EXERCISES: CPT

## 2023-11-05 PROCEDURE — 83735 ASSAY OF MAGNESIUM: CPT

## 2023-11-05 PROCEDURE — 87040 BLOOD CULTURE FOR BACTERIA: CPT

## 2023-11-05 PROCEDURE — 6370000000 HC RX 637 (ALT 250 FOR IP): Performed by: INTERNAL MEDICINE

## 2023-11-05 PROCEDURE — 83605 ASSAY OF LACTIC ACID: CPT

## 2023-11-05 PROCEDURE — 2580000003 HC RX 258: Performed by: INTERNAL MEDICINE

## 2023-11-05 PROCEDURE — 36415 COLL VENOUS BLD VENIPUNCTURE: CPT

## 2023-11-05 RX ORDER — MIDODRINE HYDROCHLORIDE 2.5 MG/1
2.5 TABLET ORAL
Status: DISCONTINUED | OUTPATIENT
Start: 2023-11-05 | End: 2023-11-05

## 2023-11-05 RX ORDER — MIDODRINE HYDROCHLORIDE 5 MG/1
5 TABLET ORAL
Status: DISCONTINUED | OUTPATIENT
Start: 2023-11-05 | End: 2023-11-10 | Stop reason: HOSPADM

## 2023-11-05 RX ORDER — FUROSEMIDE 10 MG/ML
40 INJECTION INTRAMUSCULAR; INTRAVENOUS DAILY
Status: DISCONTINUED | OUTPATIENT
Start: 2023-11-05 | End: 2023-11-09

## 2023-11-05 RX ORDER — 0.9 % SODIUM CHLORIDE 0.9 %
500 INTRAVENOUS SOLUTION INTRAVENOUS ONCE
Status: COMPLETED | OUTPATIENT
Start: 2023-11-05 | End: 2023-11-05

## 2023-11-05 RX ADMIN — MIDODRINE HYDROCHLORIDE 5 MG: 5 TABLET ORAL at 17:09

## 2023-11-05 RX ADMIN — LEVOTHYROXINE SODIUM 150 MCG: 75 TABLET ORAL at 06:21

## 2023-11-05 RX ADMIN — SODIUM CHLORIDE, PRESERVATIVE FREE 10 ML: 5 INJECTION INTRAVENOUS at 21:41

## 2023-11-05 RX ADMIN — APIXABAN 5 MG: 5 TABLET, FILM COATED ORAL at 08:33

## 2023-11-05 RX ADMIN — FOLIC ACID 1 MG: 1 TABLET ORAL at 08:33

## 2023-11-05 RX ADMIN — AMLODIPINE BESYLATE 5 MG: 5 TABLET ORAL at 08:33

## 2023-11-05 RX ADMIN — Medication 5000 UNITS: at 08:32

## 2023-11-05 RX ADMIN — APIXABAN 5 MG: 5 TABLET, FILM COATED ORAL at 21:41

## 2023-11-05 RX ADMIN — CYANOCOBALAMIN TAB 500 MCG 500 MCG: 500 TAB at 08:33

## 2023-11-05 RX ADMIN — MIDODRINE HYDROCHLORIDE 2.5 MG: 2.5 TABLET ORAL at 12:58

## 2023-11-05 RX ADMIN — SODIUM CHLORIDE, PRESERVATIVE FREE 10 ML: 5 INJECTION INTRAVENOUS at 08:35

## 2023-11-05 RX ADMIN — SPIRONOLACTONE 25 MG: 25 TABLET ORAL at 08:33

## 2023-11-05 RX ADMIN — SODIUM CHLORIDE, PRESERVATIVE FREE 10 ML: 5 INJECTION INTRAVENOUS at 21:44

## 2023-11-05 RX ADMIN — ESCITALOPRAM OXALATE 20 MG: 10 TABLET ORAL at 08:32

## 2023-11-05 RX ADMIN — OXYCODONE AND ACETAMINOPHEN 1 TABLET: 325; 5 TABLET ORAL at 04:18

## 2023-11-05 RX ADMIN — MELATONIN 3 MG: 3 TAB ORAL at 21:41

## 2023-11-05 RX ADMIN — MELATONIN 3 MG: 3 TAB ORAL at 00:26

## 2023-11-05 RX ADMIN — OXYCODONE AND ACETAMINOPHEN 1 TABLET: 325; 5 TABLET ORAL at 08:33

## 2023-11-05 RX ADMIN — Medication 400 MG: at 08:33

## 2023-11-05 RX ADMIN — ROSUVASTATIN 20 MG: 20 TABLET, FILM COATED ORAL at 21:41

## 2023-11-05 RX ADMIN — SODIUM CHLORIDE 500 ML: 9 INJECTION, SOLUTION INTRAVENOUS at 14:29

## 2023-11-05 RX ADMIN — LISINOPRIL 5 MG: 5 TABLET ORAL at 08:33

## 2023-11-05 RX ADMIN — FENOFIBRATE 160 MG: 160 TABLET ORAL at 08:32

## 2023-11-05 ASSESSMENT — PAIN SCALES - GENERAL
PAINLEVEL_OUTOF10: 7
PAINLEVEL_OUTOF10: 8
PAINLEVEL_OUTOF10: 7
PAINLEVEL_OUTOF10: 4
PAINLEVEL_OUTOF10: 6
PAINLEVEL_OUTOF10: 5
PAINLEVEL_OUTOF10: 6
PAINLEVEL_OUTOF10: 8
PAINLEVEL_OUTOF10: 8
PAINLEVEL_OUTOF10: 5
PAINLEVEL_OUTOF10: 7
PAINLEVEL_OUTOF10: 8
PAINLEVEL_OUTOF10: 5

## 2023-11-05 ASSESSMENT — PAIN DESCRIPTION - ORIENTATION: ORIENTATION: RIGHT

## 2023-11-05 ASSESSMENT — ENCOUNTER SYMPTOMS
BACK PAIN: 0
BACK PAIN: 1
NAUSEA: 0
SHORTNESS OF BREATH: 1
GASTROINTESTINAL NEGATIVE: 1
ABDOMINAL PAIN: 0
VOMITING: 0
VOICE CHANGE: 0

## 2023-11-05 ASSESSMENT — PAIN DESCRIPTION - LOCATION: LOCATION: ARM
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
## 2023-11-05 NOTE — PROGRESS NOTES
Restrictions  Restrictions/Precautions  Restrictions/Precautions: Weight Bearing, Fall Risk, Up as Tolerated  Required Braces or Orthoses?: Yes (sling to RUE)  Upper Extremity Weight Bearing Restrictions  Right Upper Extremity Weight Bearing: Non Weight Bearing  Required Braces or Orthoses  Right Upper Extremity Brace/Splint: Sling  Position Activity Restriction  Other position/activity restrictions: Per last visit with Dr. Marla Crespo on 11/1/23:  \"Consequently he was instructed to continue on with the sling wear for an additional week at which time he can wean out of his sling and start using the arm for light activities of daily living limiting any lifting pushing or pulling to 1 to 2 pounds at the most.  He was encouraged to start working on his pendulum exercises and he was once again shown how to accomplish this safely. A prescription for physical therapy was provided to begin in 1 week's time. \"    Subjective   General  Patient assessed for rehabilitation services?: Yes  Response to previous treatment: Patient reporting fatigue but able to participate  Family / Caregiver Present: No  Subjective  Subjective: Patient reports 7/10 R shoulder pain and L lateral side pain. Pain in L side increased with standing. General Comment  Comments: RN OK for therapy. Patient cooperative however, declined ambulation due to pain. Objective              Safety Devices  Type of Devices: Call light within reach; Left in bed;Gait belt; All fall risk precautions in place  Restraints  Restraints Initially in Place: No    Balance  Sitting: Impaired (static/dynamic-SBA)  Standing: Impaired (static-SBA, dynamic-CGA)    Functional mobility  Overall Level of Assistance: Contact-guard assistance; Additional time; Adaptive equipment;Assist X1 (patient only completed side stepping to head of bed due to L flank pain)  Assistive Device: Cane, quad;Gait belt  Interventions: Safety awareness training;Verbal cues        ADL  Grooming: Concurrent Group Co-treatment   Time In 1250         Time Out 1305         Minutes 15         Timed Code Treatment Minutes: [facility], OTR/L

## 2023-11-05 NOTE — PROGRESS NOTES
[facility]  Office: [address], DO, Pengnelson Razo, DO, Ben Rossderick, DO, Melony Valerio Blood, DO, Ernestine Rosa MD, Nirmal Chacon MD, Graeme Thakkar MD, Sam Torres MD,  Ze Mott MD, Shelton Mcginnis MD, Abiel Tolentino MD,  Kyaw Fountain MD, Sherwin Cosme MD, Bascom Osler, DO, Michelle Zabala MD,  Tram Connor, DO, Kathy Martinez MD, Sharon Richard MD, Kath Newell MD, Navjot Baptiste MD,  Mable Vo MD, Grady Eid MD, Michael Benavides MD, Penny Villalba MD, Rosa Lundberg MD, Adali Malone, DO, Jas Lawton, DO, Meliton Bloch, MD,  Ashlee Sutton MD, Julio Gamboa, CNP,  Severo Rous, CNP, Delroy Villalobos CNP,  Barbara Carney DNP, Salina Alvarenga CNP, Dede Carpio CNP, Essie Perez, CNP, Chel Quintanilla, CNP, Carolyn Huddleston, CNP, Lee Archuleta, CNP, Silas Posadas, CNS, Mickey Chowdhury CNP, Mary Dears, [address] [place] [address]    Progress Note    11/5/2023    12:54 PM    Name:   Zara Hansen  MRN:     [id number]     [address]:      [de-identified]   Room:   [address] [place] [address] Day:  4  Admit Date:  11/1/2023  3:05 PM    PCP:   ANDERSON Zabala CNP  Code Status:  DNR-CCA    Subjective:     C/C:   Chief Complaint   Patient presents with    Shortness of Breath     Pt arrives dt of sob . Pt does have to get thoracentesis for fluid removal. Pt daughter states he has a new company coming to the house and they have not been removing the usual 1 L of fluid . Interval History Status: improved. Seen and examined today at time of my evaluation he was seen resting comfortably in his bed he denies any complaint however he reported difficulty breathing with exertion.   Lab work reviewed remarkable for elevated creatinine level 1.5, most likely due to Lasix we will hold Lasix for now and will continue with the drainage from the catheter  Blood pressure soft today we will hold blood tenderness. There is no rebound. Musculoskeletal:         General: No deformity. Cervical back: No rigidity or tenderness. Right lower leg: No edema. Left lower leg: No edema. Lymphadenopathy:      Cervical: No cervical adenopathy. Skin:     Coloration: Skin is not jaundiced or pale. Findings: No lesion or rash. Neurological:      General: No focal deficit present. Mental Status: He is alert and oriented to person, place, and time. Sensory: No sensory deficit. Motor: No weakness.    Psychiatric:         Mood and Affect: Mood normal.         Assessment:     Hospital Problems             Last Modified POA    * (Principal) Pleural effusion 11/1/2023 Yes    Pleural effusion on left 11/2/2023 Yes     Plan:     Worsening shortness of breath due to left pleural effusion improving, c, continue drainage from the pleural drainage catheter, pulmonology is following appreciate input  Diastolic heart failure, his recent echo was normal systolic function, continue we will hold Lasix today due to elevated creatinine and worsening kidney function   A-fib status post ablation continue metoprolol, Eliquis  Hypotension patient is asymptomatic we will hold blood pressure medication if systolic less than 926, will start midodrine to support blood pressure, will consider bolus of fluid if MAP remained less than 65, so far no sign of sepsis no fever or tachycardia but if his blood pressure remain low will consider sepsis work-up   dyslipidemia continue statin  Depression continue home medication  Elevated troponin type II MI, patient chest pain-free EKG with no acute changes we will continue to monitor  DVT prophylaxis on Eliquis  PT-OT  Cardiac diet    Oral Luna MD  11/5/2023  12:54 PM

## 2023-11-05 NOTE — PROGRESS NOTES
PULMONARY & CRITICAL CARE MEDICINE PROGRESS NOTE     Patient:  Jina Box  MRN: [id number]  [address] [facility] date: 11/1/2023  Primary Care Physician: ANDERSON Daugherty CNP  Consulting Physician: Kely Perez*  CODE Status: DNR-CCA  LOS: 4     SUBJECTIVE     CHIEF COMPLAINT/REASON FOR INITIAL CONSULT:Shortness of Breath (Pt arrives dt of sob . Pt does have to get thoracentesis for fluid removal. Pt daughter states he has a new company coming to the house and they have not been removing the usual 1 L of fluid . )    Olaf:  The patient is a 66 y.o. male with a left pleural effusion for the past 4 months for which the patient has had pleural drainage catheter and requiring drainage twice a week. He was informed that the pleural effusion was related to heart failure. Review of the echocardiogram done on 8/8/2023 shows he has a normal ejection fraction. There was no comment regarding diastolic heart failure.   Review of the patient's records show [place] [facility] [place] diagnosed with diastolic congestive heart failure  Patient with history of cardiac surgery in 2020 and 2021  No history of malignancy  No hypothyroidism  No exposure to tuberculosis, asbestos  Not on any medication that could lead to pleural effusion  No history of DVT or pulmonary embolism  Patient with known history of atrial fibrillation status post ablation few years ago and on examination he appears to be in a regular rhythm  No trauma to the chest  No fever chills or night sweats  No weight loss or loss of appetite  Pleural fluid analysis showed the pleural effusion to be a transudate with 83% lymphocytes and cytology being negative for malignancy  Patient apparently has home care company that is been helping remove the fluid twice a week, but the company has changed recently and according to the patient he feels the new nursing staff has not been an adequate drainage hence leading to his symptoms    INTERVAL

## 2023-11-06 PROBLEM — D72.829 LEUKOCYTOSIS: Status: ACTIVE | Noted: 2023-11-06

## 2023-11-06 PROBLEM — J98.11 ATELECTASIS, LEFT: Status: ACTIVE | Noted: 2023-11-06

## 2023-11-06 PROBLEM — N17.9 AKI (ACUTE KIDNEY INJURY) (HCC): Status: ACTIVE | Noted: 2023-11-06

## 2023-11-06 PROBLEM — I50.32 CHRONIC DIASTOLIC CONGESTIVE HEART FAILURE (HCC): Status: ACTIVE | Noted: 2023-11-06

## 2023-11-06 LAB
ANION GAP SERPL CALCULATED.3IONS-SCNC: 12 MMOL/L (ref 9–17)
BUN SERPL-MCNC: 58 MG/DL (ref 8–23)
CALCIUM SERPL-MCNC: 9.1 MG/DL (ref 8.6–10.4)
CHLORIDE SERPL-SCNC: 96 MMOL/L (ref 98–107)
CO2 SERPL-SCNC: 24 MMOL/L (ref 20–31)
CREAT SERPL-MCNC: 1.9 MG/DL (ref 0.7–1.2)
GFR SERPL CREATININE-BSD FRML MDRD: 36 ML/MIN/1.73M2
GLUCOSE SERPL-MCNC: 96 MG/DL (ref 70–99)
MAGNESIUM SERPL-MCNC: 1.8 MG/DL (ref 1.6–2.6)
POTASSIUM SERPL-SCNC: 4.4 MMOL/L (ref 3.7–5.3)
SODIUM SERPL-SCNC: 132 MMOL/L (ref 135–144)

## 2023-11-06 PROCEDURE — 36415 COLL VENOUS BLD VENIPUNCTURE: CPT

## 2023-11-06 PROCEDURE — 2580000003 HC RX 258: Performed by: STUDENT IN AN ORGANIZED HEALTH CARE EDUCATION/TRAINING PROGRAM

## 2023-11-06 PROCEDURE — 99232 SBSQ HOSP IP/OBS MODERATE 35: CPT | Performed by: STUDENT IN AN ORGANIZED HEALTH CARE EDUCATION/TRAINING PROGRAM

## 2023-11-06 PROCEDURE — 6370000000 HC RX 637 (ALT 250 FOR IP): Performed by: NURSE PRACTITIONER

## 2023-11-06 PROCEDURE — 83735 ASSAY OF MAGNESIUM: CPT

## 2023-11-06 PROCEDURE — 2580000003 HC RX 258: Performed by: RADIOLOGY

## 2023-11-06 PROCEDURE — 6370000000 HC RX 637 (ALT 250 FOR IP): Performed by: INTERNAL MEDICINE

## 2023-11-06 PROCEDURE — 99233 SBSQ HOSP IP/OBS HIGH 50: CPT | Performed by: INTERNAL MEDICINE

## 2023-11-06 PROCEDURE — 6360000002 HC RX W HCPCS: Performed by: RADIOLOGY

## 2023-11-06 PROCEDURE — 32562 LYSE CHEST FIBRIN SUBQ DAY: CPT | Performed by: INTERNAL MEDICINE

## 2023-11-06 PROCEDURE — 6360000002 HC RX W HCPCS: Performed by: STUDENT IN AN ORGANIZED HEALTH CARE EDUCATION/TRAINING PROGRAM

## 2023-11-06 PROCEDURE — 1200000000 HC SEMI PRIVATE

## 2023-11-06 PROCEDURE — 6370000000 HC RX 637 (ALT 250 FOR IP): Performed by: HOSPITALIST

## 2023-11-06 PROCEDURE — 80048 BASIC METABOLIC PNL TOTAL CA: CPT

## 2023-11-06 PROCEDURE — 2580000003 HC RX 258: Performed by: HOSPITALIST

## 2023-11-06 RX ADMIN — FENOFIBRATE 160 MG: 160 TABLET ORAL at 08:16

## 2023-11-06 RX ADMIN — LEVOTHYROXINE SODIUM 150 MCG: 75 TABLET ORAL at 06:15

## 2023-11-06 RX ADMIN — MELATONIN 3 MG: 3 TAB ORAL at 21:55

## 2023-11-06 RX ADMIN — ACETAMINOPHEN 650 MG: 325 TABLET ORAL at 15:55

## 2023-11-06 RX ADMIN — MIDODRINE HYDROCHLORIDE 5 MG: 5 TABLET ORAL at 12:52

## 2023-11-06 RX ADMIN — SODIUM CHLORIDE, PRESERVATIVE FREE 10 ML: 5 INJECTION INTRAVENOUS at 22:43

## 2023-11-06 RX ADMIN — PIPERACILLIN AND TAZOBACTAM 4500 MG: 4; .5 INJECTION, POWDER, LYOPHILIZED, FOR SOLUTION INTRAVENOUS at 15:16

## 2023-11-06 RX ADMIN — Medication 5000 UNITS: at 08:16

## 2023-11-06 RX ADMIN — DORNASE ALFA 5 MG: 1 SOLUTION RESPIRATORY (INHALATION) at 12:52

## 2023-11-06 RX ADMIN — ALTEPLASE 5 MG: 2.2 INJECTION, POWDER, LYOPHILIZED, FOR SOLUTION INTRAVENOUS at 12:52

## 2023-11-06 RX ADMIN — ACETAMINOPHEN 650 MG: 325 TABLET ORAL at 21:54

## 2023-11-06 RX ADMIN — FOLIC ACID 1 MG: 1 TABLET ORAL at 08:16

## 2023-11-06 RX ADMIN — CYANOCOBALAMIN TAB 500 MCG 500 MCG: 500 TAB at 08:16

## 2023-11-06 RX ADMIN — ROSUVASTATIN 20 MG: 20 TABLET, FILM COATED ORAL at 21:54

## 2023-11-06 RX ADMIN — ESCITALOPRAM OXALATE 20 MG: 10 TABLET ORAL at 08:16

## 2023-11-06 RX ADMIN — PIPERACILLIN AND TAZOBACTAM 3375 MG: 3; .375 INJECTION, POWDER, LYOPHILIZED, FOR SOLUTION INTRAVENOUS at 22:43

## 2023-11-06 RX ADMIN — APIXABAN 5 MG: 5 TABLET, FILM COATED ORAL at 08:16

## 2023-11-06 RX ADMIN — Medication 400 MG: at 08:16

## 2023-11-06 RX ADMIN — APIXABAN 5 MG: 5 TABLET, FILM COATED ORAL at 21:55

## 2023-11-06 RX ADMIN — SODIUM CHLORIDE, PRESERVATIVE FREE 10 ML: 5 INJECTION INTRAVENOUS at 08:17

## 2023-11-06 RX ADMIN — MIDODRINE HYDROCHLORIDE 5 MG: 5 TABLET ORAL at 18:13

## 2023-11-06 RX ADMIN — MIDODRINE HYDROCHLORIDE 5 MG: 5 TABLET ORAL at 08:16

## 2023-11-06 ASSESSMENT — PAIN - FUNCTIONAL ASSESSMENT: PAIN_FUNCTIONAL_ASSESSMENT: ACTIVITIES ARE NOT PREVENTED

## 2023-11-06 ASSESSMENT — ENCOUNTER SYMPTOMS
BACK PAIN: 0
VOMITING: 0
GASTROINTESTINAL NEGATIVE: 1
SHORTNESS OF BREATH: 1
NAUSEA: 0
VOICE CHANGE: 0
ABDOMINAL PAIN: 0
BACK PAIN: 1

## 2023-11-06 ASSESSMENT — PAIN SCALES - GENERAL
PAINLEVEL_OUTOF10: 5
PAINLEVEL_OUTOF10: 6
PAINLEVEL_OUTOF10: 7
PAINLEVEL_OUTOF10: 6
PAINLEVEL_OUTOF10: 6
PAINLEVEL_OUTOF10: 3

## 2023-11-06 ASSESSMENT — PAIN DESCRIPTION - DESCRIPTORS
DESCRIPTORS: ACHING
DESCRIPTORS: ACHING;DISCOMFORT

## 2023-11-06 ASSESSMENT — PAIN DESCRIPTION - ORIENTATION
ORIENTATION: RIGHT
ORIENTATION: LEFT

## 2023-11-06 ASSESSMENT — PAIN DESCRIPTION - LOCATION
LOCATION: ABDOMEN
LOCATION: SHOULDER

## 2023-11-06 NOTE — PROGRESS NOTES
Physical Therapy        Physical Therapy Cancel Note      DATE: 2023    NAME: Henrry Snow  MRN: [id number]   : 1945      Patient not seen this date for Physical Therapy due to:    Pt refusing any attempt at out of bed and refusing bed level exercises due to left flank pain 7/10. Pleural effusion currently being drained. Pt educated on importance of activity and exs, however, pt continued to refuse. Will continue to pursue PT treatment.        Electronically signed by José Benítez on 2023 at 2:36 PM

## 2023-11-06 NOTE — PLAN OF CARE
Problem: Discharge Planning  Goal: Discharge to home or other facility with appropriate resources  11/6/2023 0710 by Sarah Myers RN  Outcome: Progressing  11/5/2023 2323 by Kellee Hermosillo RN  Outcome: Progressing     Problem: Pain  Goal: Verbalizes/displays adequate comfort level or baseline comfort level  11/6/2023 0710 by Sarah Myers RN  Outcome: Progressing  11/5/2023 2323 by Kellee Hermosillo RN  Outcome: Progressing     Problem: Safety - Adult  Goal: Free from fall injury  11/6/2023 0710 by Sarah Myers RN  Outcome: Progressing  11/5/2023 2323 by Kellee Hermosillo RN  Outcome: Progressing     Problem: ABCDS Injury Assessment  Goal: Absence of physical injury  11/6/2023 0710 by Sarah Myers RN  Outcome: Progressing  11/5/2023 2323 by Kellee Hermosillo RN  Outcome: Progressing     Problem: Chronic Conditions and Co-morbidities  Goal: Patient's chronic conditions and co-morbidity symptoms are monitored and maintained or improved  11/6/2023 0710 by Sarah Myers RN  Outcome: Progressing  11/5/2023 2323 by Kellee Hermosillo RN  Outcome: Progressing     Problem: Neurosensory - Adult  Goal: Achieves stable or improved neurological status  Outcome: Progressing     Problem: Respiratory - Adult  Goal: Achieves optimal ventilation and oxygenation  Outcome: Progressing     Problem: Cardiovascular - Adult  Goal: Maintains optimal cardiac output and hemodynamic stability  Outcome: Progressing     Problem: Skin/Tissue Integrity - Adult  Goal: Skin integrity remains intact  Outcome: Progressing     Problem: Musculoskeletal - Adult  Goal: Return mobility to safest level of function  Outcome: Progressing     Problem: Gastrointestinal - Adult  Goal: Minimal or absence of nausea and vomiting  Outcome: Progressing     Problem: Genitourinary - Adult  Goal: Absence of urinary retention  Outcome: Progressing     Problem: Infection - Adult  Goal: Absence of infection at discharge  Outcome: Progressing     Problem: Metabolic/Fluid and Electrolytes - Adult  Goal: Electrolytes maintained within normal limits  Outcome: Progressing     Problem: Hematologic - Adult  Goal: Maintains hematologic stability  Outcome: Progressing     Problem: Skin/Tissue Integrity  Goal: Absence of new skin breakdown  Description: 1. Monitor for areas of redness and/or skin breakdown  2. Assess vascular access sites hourly  3. Every 4-6 hours minimum:  Change oxygen saturation probe site  4. Every 4-6 hours:  If on nasal continuous positive airway pressure, respiratory therapy assess nares and determine need for appliance change or resting period.   Outcome: Progressing

## 2023-11-06 NOTE — PROGRESS NOTES
[facility]  Office: [address], DO, Emerald Roberto, DO, Reyna Sotelo, DO, Jefe Garcia Blood, DO, Wendy Hurd MD, Silviano Weir MD, Mary Jasmine MD, Socrates Bailey MD,  Chico Stearns MD, Granville Aschoff, MD, Feng Serrato MD,  Eliza Richardson MD, Rema Dutta MD, Laura Garza, DO, Martha Snider MD,  Tj Cardoso DO, Stephanie Mcrae MD, Jonh Catalan MD, Alen Mead MD, Hannah Guy MD,  Anuja Snell MD, Chadd Cooney MD, Leela Mcfarlane MD, Leida Hylton MD, Yomaira Zuñiga MD, Gomez Aleman, DO, Yonis Byers, DO, Myrtle Bear MD,  Lila Jules MD, Yeny Madrigal, Vanna Noland, CNP, Alana Malcolm, CNP,   [facility], DNP, Maday Russell, CNP, John Hinton, CNP, [facility], CNP, Adonay Muniz, CNP, Panfilo Gagnon, CNP, Gaby Glover, CNP, Pedro Funk, CNS, Jose Knig, [facility], Devendra Torres, [address] [place] [address]    Progress Note    11/6/2023    1:27 PM    Name:   Mela Victoria  MRN:     [id number]     [address]:      <[facility]>   Room:   [address] [place] [address] Day:  5  Admit Date:  11/1/2023  3:05 PM    PCP:   ANDERSON Alvarez CNP  Code Status:  DNR-CCA    Subjective:     C/C:   Chief Complaint   Patient presents with    Shortness of Breath     Pt arrives dt of sob . Pt does have to get thoracentesis for fluid removal. Pt daughter states he has a new company coming to the house and they have not been removing the usual 1 L of fluid . Interval History Status: improved.    Seen and examined, continues to report difficulty breathing with exertion  Pulmonology was at bedside today and will resume drainage from the catheter  Repeat chest x-ray showed worsening of pleural effusion and consolidation, patient developed leukocytosis with hypotension will start patient on Zosyn and will continue to monitor, if patient blood pressure remain low will consider sepsis Skin:     Coloration: Skin is not jaundiced or pale. Findings: No lesion or rash. Neurological:      General: No focal deficit present. Mental Status: He is alert and oriented to person, place, and time. Sensory: No sensory deficit. Motor: No weakness. Psychiatric:         Mood and Affect: Mood normal.         Assessment:     Hospital Problems             Last Modified POA    * (Principal) Pleural effusion 11/1/2023 Yes    Pleural effusion on left 11/2/2023 Yes   Plan:     Worsening shortness of breath due to left pleural effusion c, continue drainage from the pleural drainage catheter, pulmonology is following appreciate input, Lasix on hold due to kidney injury, nephrology was consulted will follow recommendation  Pneumonia?   Repeat x-ray with worsening pleural effusion and consolidation, patient developed leukocytosis and hypotension will start patient on Zosyn, if WBC count continues to trend up and if patient remain hypotensive will consider panculture  Acute kidney injury, will continue to hold Lasix, creatinine continue to rise up, nephrology was consulted today we will wait for recommendation, continue to hold lisinopril  Diastolic heart failure, his recent echo was normal systolic function, continue we will hold Lasix today due to elevated creatinine and worsening kidney function   A-fib status post ablation continue metoprolol, Eliquis  Hypotension patient is asymptomatic we will hold blood pressure medication if systolic less than 109, continue midodrine to support blood pressure, will consider bolus of fluid if MAP remained less than 65, not sure if related to pneumonia we will start patient on IV antibiotic to cover pneumonia   dyslipidemia continue statin  Depression continue home medication  Elevated troponin type II MI, patient chest pain-free EKG with no acute changes we will continue to monitor  DVT prophylaxis on Eliquis  PT-OT  Cardiac diet    Robinson Devries,

## 2023-11-06 NOTE — PROGRESS NOTES
Occupational [address]  Occupational Therapy Not Seen Note    DATE: 2023    NAME: Minda Del Rio  MRN: [id number]   : 1945      Patient not seen this date for Occupational Therapy due to:    Patient Declined: Stating he was in the process of being drained (pleural effusion) however, RN stated he is to start at 46 and just had the medicine and patient is encouraged to move in order to move medication around. Discussed with patient and patient continues to decline out of bed stating he is having too much pain. Continue to pursue OT treatment.     Next Scheduled Treatment: 23    Electronically signed by SCOTT Kim on 2023 at 2:11 PM

## 2023-11-06 NOTE — PROGRESS NOTES
PULMONARY & CRITICAL CARE MEDICINE PROGRESS NOTE     Patient:  Elmer Tam  MRN: [id number]  [address] [facility] date: 11/1/2023  Primary Care Physician: Analy Mendoza, ANDERSON - CNP  Consulting Physician: Sadie Conley*  CODE Status: DNR-CCA  LOS: 5     SUBJECTIVE     CHIEF COMPLAINT/REASON FOR INITIAL CONSULT:Shortness of Breath (Pt arrives dt of sob . Pt does have to get thoracentesis for fluid removal. Pt daughter states he has a new company coming to the house and they have not been removing the usual 1 L of fluid . )    Olaf:  The patient is a 66 y.o. male with a left pleural effusion for the past 4 months for which the patient has had pleural drainage catheter and requiring drainage twice a week. He was informed that the pleural effusion was related to heart failure. Review of the echocardiogram done on 8/8/2023 shows he has a normal ejection fraction. There was no comment regarding diastolic heart failure.   Review of the patient's records show that at [facility] he was diagnosed with diastolic congestive heart failure  Patient with history of cardiac surgery in 2020 and 2021  No history of malignancy  No hypothyroidism  No exposure to tuberculosis, asbestos  Not on any medication that could lead to pleural effusion  No history of DVT or pulmonary embolism  Patient with known history of atrial fibrillation status post ablation few years ago and on examination he appears to be in a regular rhythm  No trauma to the chest  No fever chills or night sweats  No weight loss or loss of appetite  Pleural fluid analysis showed the pleural effusion to be a transudate with 83% lymphocytes and cytology being negative for malignancy  Patient apparently has home care company that is been helping remove the fluid twice a week, but the company has changed recently and according to the patient he feels the new nursing staff has not been an adequate drainage hence leading to his symptoms    INTERVAL 5 mg Oral TID WC    alteplase (CATHFLO) 5 mg in sodium chloride 0.9 % 30 mL  5 mg IntraPLEUral Once    And    dornase alpha (PULMOZYME) 5 mg in sterile water 30 mL  5 mg IntraPLEUral Once    [Held by provider] amLODIPine  5 mg Oral Daily    apixaban  5 mg Oral BID    escitalopram  20 mg Oral Daily    fenofibrate  160 mg Oral Daily    folic acid  1 mg Oral Daily    levothyroxine  150 mcg Oral Daily    magnesium oxide  400 mg Oral Daily    [Held by provider] metoprolol succinate  100 mg Oral Nightly    rosuvastatin  20 mg Oral Daily    [Held by provider] spironolactone  25 mg Oral Daily    vitamin B-12  500 mcg Oral Daily    Vitamin D  5,000 Units Oral Daily    sodium chloride flush  5-40 mL IntraVENous 2 times per day    [Held by provider] lisinopril  5 mg Oral Daily     Continuous Infusions:   sodium chloride         INPUT/OUTPUT:  In: 690 [P.O.:690]  Out: 350 [Urine:350]  Date 11/06/23 0000 - 11/06/23 2359   Shift 7558-4498 6254-0998 9171-9195 24 Hour Total   INTAKE   P.O.(mL/kg/hr) 360(0.4)   360   Shift Total(mL/kg) 360(2.9)   360(2.9)   OUTPUT   Urine(mL/kg/hr) 200(0.2)   200   Shift Total(mL/kg) 200(1.6)   200(1.6)   Weight (kg) 123 123 123 123          LABORATORY RESULTS:  BLOOD GASES:   No results for input(s): \"POCPH\", \"POCPCO2\", \"POCPO2\", \"POCHCO3\", \"LODQ3XQK\" in the last 72 hours. COMPLETE BLOOD COUNTS:   Recent Labs     11/05/23  1357   WBC 14.9*   HGB 9.2*   HCT 27.3*   .2*      RBC 2.50*   MCH 36.8*   MCHC 33.7   RDW 14.3       C-REACTIVE PROTEIN:   No results for input(s): \"CRP\" in the last 72 hours. LACTATE DEHYDROGENASE:   No results for input(s): \"LDH\" in the last 72 hours.   BASIC METABOLIC PROFILE:   Recent Labs     11/04/23  0551 11/05/23  0521 11/06/23  0730    135 132*   K 4.8 5.3 4.4    99 96*   CO2 28 26 24   BUN 37* 51* 58*   CREATININE 1.1 1.6* 1.9*   GLUCOSE 99 102* 96   MG 1.7 1.7 1.8       LIVER FUNCTION TESTS:   No results for input(s): \"PROT\", \"LABALBU\",

## 2023-11-06 NOTE — PROGRESS NOTES
[address] [facility]  PROGRESS NOTE    Room # 317/317-01   Name: Ezell Gottron            Worship: Estefany Fair     Reason for visit: Follow up    I visited the patient. Admit Date & Time: 11/1/2023  3:05 PM    Assessment:  Ezell Gottron is a 66 y.o. male in the hospital. Upon entering the room Writer observed Patient was reclined in bed. RN was at the computer at bedside. Patient smiled and greeted Bella Peabody. He reported, \"I've had better days. \" Pt declined a visit at this time. Intervention:  Writer inquired how Pt was doing. Writer asked if Pt was interested in a visit today. Writer assured Pt of her prayers and offered words of support. Outcome:  Patient thanked writer for stopping. Plan:  Chaplains will remain available to offer spiritual and emotional support as needed. 11/06/23 1623   Encounter Summary   Service Provided For: Patient   Referral/Consult From:  [address] Family members; Children   Last Encounter  11/03/23   Complexity of Encounter Low   Begin Time 1600   End Time  1502   Total Time Calculated 1382 min   Spiritual/Emotional needs   Type Spiritual Support   Assessment/Intervention/Outcome   Assessment Calm;Coping   Intervention Active listening;Explored/Affirmed feelings, thoughts, concerns;Sustaining Presence/Ministry of presence   Outcome Coping;Engaged in conversation; Refused/Declined;Expressed Gratitude   Plan and Referrals   Plan/Referrals Continue Support (comment)       Electronically signed by Lauri Jenkins on 11/6/2023 at 4:24 PM.  [facility]  [phone]

## 2023-11-06 NOTE — PROGRESS NOTES
Procedure for intrapleural alteplase/dornase alpha administration. 30 mL of alteplase and 30 mL of dornase chaz was administered through the Pleurx catheter without any difficulty and drainage catheter was clamped. Nursing staff was present. Discussed with nursing staff to keep the pleural catheter clamped for 3 hours and after 3 hours unclamped at 3 45 pm and connected to Vacutainer bottle and drained the fluid once it is stopped then applied the white stopper in place and applied dressing. Please note that this chart was generated using voice recognition Dragon dictation software. Although every effort was made to ensure the accuracy of this automated transcription, some errors in transcription may have occurred.      Ann Marie Guerrero MD  11/6/2023 12:55 PM

## 2023-11-06 NOTE — PROGRESS NOTES
Drained 850cc of dark ginny fluid from left sided pleural catheter. Pt tolerated procedure. VS WNL before during and after drainage. Clamps were tightened and cleansed prior to new dressing being placed around site. No new redness or drainage noted . Dr aware of amount drained. Denies any further issues at this time. Will continue to monitor.

## 2023-11-07 ENCOUNTER — APPOINTMENT (OUTPATIENT)
Dept: GENERAL RADIOLOGY | Age: 78
End: 2023-11-07
Payer: MEDICARE

## 2023-11-07 ENCOUNTER — APPOINTMENT (OUTPATIENT)
Dept: ULTRASOUND IMAGING | Age: 78
End: 2023-11-07
Attending: INTERNAL MEDICINE
Payer: MEDICARE

## 2023-11-07 PROBLEM — I95.9 ARTERIAL HYPOTENSION: Status: ACTIVE | Noted: 2023-11-07

## 2023-11-07 LAB
ANION GAP SERPL CALCULATED.3IONS-SCNC: 12 MMOL/L (ref 9–17)
BACTERIA URNS QL MICRO: ABNORMAL
BILIRUB UR QL STRIP: NEGATIVE
BNP SERPL-MCNC: 7420 PG/ML
BUN SERPL-MCNC: 53 MG/DL (ref 8–23)
CALCIUM SERPL-MCNC: 9 MG/DL (ref 8.6–10.4)
CHLORIDE SERPL-SCNC: 100 MMOL/L (ref 98–107)
CHLORIDE UR-SCNC: 52 MMOL/L
CLARITY UR: CLEAR
CO2 SERPL-SCNC: 22 MMOL/L (ref 20–31)
COLOR UR: YELLOW
CREAT SERPL-MCNC: 1.6 MG/DL (ref 0.7–1.2)
CREAT UR-MCNC: 65.6 MG/DL (ref 39–259)
EPI CELLS #/AREA URNS HPF: ABNORMAL /HPF (ref 0–5)
ERYTHROCYTE [DISTWIDTH] IN BLOOD BY AUTOMATED COUNT: 14.8 % (ref 12.5–15.4)
GFR SERPL CREATININE-BSD FRML MDRD: 44 ML/MIN/1.73M2
GLUCOSE SERPL-MCNC: 117 MG/DL (ref 70–99)
GLUCOSE UR STRIP-MCNC: NEGATIVE MG/DL
HCT VFR BLD AUTO: 25.1 % (ref 41–53)
HGB BLD-MCNC: 8.4 G/DL (ref 13.5–17.5)
HGB UR QL STRIP.AUTO: NEGATIVE
KETONES UR STRIP-MCNC: NEGATIVE MG/DL
LEUKOCYTE ESTERASE UR QL STRIP: ABNORMAL
MCH RBC QN AUTO: 36.2 PG (ref 26–34)
MCHC RBC AUTO-ENTMCNC: 33.7 G/DL (ref 31–37)
MCV RBC AUTO: 107.6 FL (ref 80–100)
NITRITE UR QL STRIP: NEGATIVE
PH UR STRIP: 6.5 [PH] (ref 5–8)
PLATELET # BLD AUTO: 430 K/UL (ref 140–450)
PMV BLD AUTO: 8.2 FL (ref 6–12)
POTASSIUM SERPL-SCNC: 4.6 MMOL/L (ref 3.7–5.3)
PROT UR STRIP-MCNC: NEGATIVE MG/DL
RBC # BLD AUTO: 2.33 M/UL (ref 4.5–5.9)
RBC #/AREA URNS HPF: ABNORMAL /HPF (ref 0–2)
SODIUM SERPL-SCNC: 134 MMOL/L (ref 135–144)
SODIUM UR-SCNC: 95 MMOL/L
SP GR UR STRIP: 1.01 (ref 1–1.03)
TOTAL PROTEIN, URINE: 12 MG/DL
URINE TOTAL PROTEIN CREATININE RATIO: 0.18 (ref 0–0.2)
UROBILINOGEN UR STRIP-ACNC: NORMAL EU/DL (ref 0–1)
WBC #/AREA URNS HPF: ABNORMAL /HPF (ref 0–5)
WBC OTHER # BLD: 11.2 K/UL (ref 3.5–11)

## 2023-11-07 PROCEDURE — 82570 ASSAY OF URINE CREATININE: CPT

## 2023-11-07 PROCEDURE — 6370000000 HC RX 637 (ALT 250 FOR IP): Performed by: NURSE PRACTITIONER

## 2023-11-07 PROCEDURE — 99232 SBSQ HOSP IP/OBS MODERATE 35: CPT | Performed by: STUDENT IN AN ORGANIZED HEALTH CARE EDUCATION/TRAINING PROGRAM

## 2023-11-07 PROCEDURE — 36415 COLL VENOUS BLD VENIPUNCTURE: CPT

## 2023-11-07 PROCEDURE — 82436 ASSAY OF URINE CHLORIDE: CPT

## 2023-11-07 PROCEDURE — 6370000000 HC RX 637 (ALT 250 FOR IP): Performed by: INTERNAL MEDICINE

## 2023-11-07 PROCEDURE — 97110 THERAPEUTIC EXERCISES: CPT

## 2023-11-07 PROCEDURE — 71045 X-RAY EXAM CHEST 1 VIEW: CPT

## 2023-11-07 PROCEDURE — 81001 URINALYSIS AUTO W/SCOPE: CPT

## 2023-11-07 PROCEDURE — 6360000002 HC RX W HCPCS: Performed by: STUDENT IN AN ORGANIZED HEALTH CARE EDUCATION/TRAINING PROGRAM

## 2023-11-07 PROCEDURE — 84300 ASSAY OF URINE SODIUM: CPT

## 2023-11-07 PROCEDURE — 85027 COMPLETE CBC AUTOMATED: CPT

## 2023-11-07 PROCEDURE — 99223 1ST HOSP IP/OBS HIGH 75: CPT | Performed by: INTERNAL MEDICINE

## 2023-11-07 PROCEDURE — 99233 SBSQ HOSP IP/OBS HIGH 50: CPT | Performed by: INTERNAL MEDICINE

## 2023-11-07 PROCEDURE — 97535 SELF CARE MNGMENT TRAINING: CPT

## 2023-11-07 PROCEDURE — 2580000003 HC RX 258: Performed by: STUDENT IN AN ORGANIZED HEALTH CARE EDUCATION/TRAINING PROGRAM

## 2023-11-07 PROCEDURE — 76775 US EXAM ABDO BACK WALL LIM: CPT

## 2023-11-07 PROCEDURE — 84166 PROTEIN E-PHORESIS/URINE/CSF: CPT

## 2023-11-07 PROCEDURE — 6370000000 HC RX 637 (ALT 250 FOR IP): Performed by: HOSPITALIST

## 2023-11-07 PROCEDURE — 83880 ASSAY OF NATRIURETIC PEPTIDE: CPT

## 2023-11-07 PROCEDURE — 2580000003 HC RX 258: Performed by: HOSPITALIST

## 2023-11-07 PROCEDURE — 97530 THERAPEUTIC ACTIVITIES: CPT

## 2023-11-07 PROCEDURE — 80048 BASIC METABOLIC PNL TOTAL CA: CPT

## 2023-11-07 PROCEDURE — 84156 ASSAY OF PROTEIN URINE: CPT

## 2023-11-07 PROCEDURE — 1200000000 HC SEMI PRIVATE

## 2023-11-07 RX ADMIN — ESCITALOPRAM OXALATE 20 MG: 10 TABLET ORAL at 08:45

## 2023-11-07 RX ADMIN — PIPERACILLIN AND TAZOBACTAM 3375 MG: 3; .375 INJECTION, POWDER, LYOPHILIZED, FOR SOLUTION INTRAVENOUS at 06:54

## 2023-11-07 RX ADMIN — LEVOTHYROXINE SODIUM 150 MCG: 75 TABLET ORAL at 06:50

## 2023-11-07 RX ADMIN — MELATONIN 3 MG: 3 TAB ORAL at 21:10

## 2023-11-07 RX ADMIN — APIXABAN 5 MG: 5 TABLET, FILM COATED ORAL at 20:53

## 2023-11-07 RX ADMIN — CYANOCOBALAMIN TAB 500 MCG 500 MCG: 500 TAB at 09:36

## 2023-11-07 RX ADMIN — PIPERACILLIN AND TAZOBACTAM 3375 MG: 3; .375 INJECTION, POWDER, LYOPHILIZED, FOR SOLUTION INTRAVENOUS at 20:52

## 2023-11-07 RX ADMIN — MIDODRINE HYDROCHLORIDE 5 MG: 5 TABLET ORAL at 08:45

## 2023-11-07 RX ADMIN — PIPERACILLIN AND TAZOBACTAM 3375 MG: 3; .375 INJECTION, POWDER, LYOPHILIZED, FOR SOLUTION INTRAVENOUS at 11:39

## 2023-11-07 RX ADMIN — FOLIC ACID 1 MG: 1 TABLET ORAL at 08:45

## 2023-11-07 RX ADMIN — ROSUVASTATIN 20 MG: 20 TABLET, FILM COATED ORAL at 20:53

## 2023-11-07 RX ADMIN — MIDODRINE HYDROCHLORIDE 5 MG: 5 TABLET ORAL at 16:48

## 2023-11-07 RX ADMIN — ACETAMINOPHEN 650 MG: 325 TABLET ORAL at 21:09

## 2023-11-07 RX ADMIN — Medication 400 MG: at 08:45

## 2023-11-07 RX ADMIN — SODIUM CHLORIDE, PRESERVATIVE FREE 10 ML: 5 INJECTION INTRAVENOUS at 08:46

## 2023-11-07 RX ADMIN — SODIUM CHLORIDE, PRESERVATIVE FREE 10 ML: 5 INJECTION INTRAVENOUS at 20:53

## 2023-11-07 RX ADMIN — SODIUM CHLORIDE: 9 INJECTION, SOLUTION INTRAVENOUS at 20:50

## 2023-11-07 RX ADMIN — MIDODRINE HYDROCHLORIDE 5 MG: 5 TABLET ORAL at 11:37

## 2023-11-07 RX ADMIN — APIXABAN 5 MG: 5 TABLET, FILM COATED ORAL at 08:46

## 2023-11-07 RX ADMIN — Medication 5000 UNITS: at 08:45

## 2023-11-07 RX ADMIN — FENOFIBRATE 160 MG: 160 TABLET ORAL at 08:46

## 2023-11-07 ASSESSMENT — PAIN SCALES - GENERAL: PAINLEVEL_OUTOF10: 3

## 2023-11-07 ASSESSMENT — ENCOUNTER SYMPTOMS
BACK PAIN: 0
ABDOMINAL PAIN: 0
VOICE CHANGE: 0
NAUSEA: 0
SHORTNESS OF BREATH: 1
BACK PAIN: 1
VOMITING: 0
GASTROINTESTINAL NEGATIVE: 1

## 2023-11-07 ASSESSMENT — PAIN DESCRIPTION - ORIENTATION: ORIENTATION: RIGHT

## 2023-11-07 ASSESSMENT — PAIN DESCRIPTION - LOCATION: LOCATION: SHOULDER

## 2023-11-07 ASSESSMENT — PAIN DESCRIPTION - DESCRIPTORS: DESCRIPTORS: ACHING

## 2023-11-07 NOTE — PLAN OF CARE
Nutrition Problem #1: Altered nutrition-related lab values  Intervention: Food and/or Nutrient Delivery: Continue Current Diet  Nutritional Goal: At least 50% PO intake prior to discharge

## 2023-11-07 NOTE — PROGRESS NOTES
Comprehensive Nutrition Assessment    Type and Reason for Visit:  RD Nutrition Re-Screen/LOS    Nutrition Recommendations/Plan:   Continue current diet, encourage PO intake  Monitor weight, intake, labs, skin     Malnutrition Assessment:  Malnutrition Status:  No malnutrition (11/07/23 1133)    Context:  Acute Illness     Findings of the 6 clinical characteristics of malnutrition:  Energy Intake:  No significant decrease in energy intake  Weight Loss:  No significant weight loss     Body Fat Loss:  No significant body fat loss     Muscle Mass Loss:  No significant muscle mass loss    Fluid Accumulation:  Mild     Strength:  Not Performed    Nutrition Assessment:    Pt admitted with pleural effusion. Length of stay nutrition assessment. No report of recent poor appetite or weight loss. Weight is stable per EMR. PO intake %. No wounds noted. Nutrition Related Findings:    +2 BLE edema noted. Na 134, BUN 53, GFR 44, Cr 1.6, Glu 117. All other labs/meds reviewed. Wound Type: None       Current Nutrition Intake & Therapies:    Average Meal Intake: %  Average Supplements Intake: None Ordered  ADULT DIET; Regular; No Added Salt (3-4 gm)    Anthropometric Measures:  Height: 180.3 cm (5' 11\")  Ideal Body Weight (IBW): 172 lbs (78 kg)    Current Body Weight: 123 kg (271 lb 2.7 oz), 157.7 % IBW.     Current BMI (kg/m2): 37.8  BMI Categories: Obese Class 2 (BMI 35.0 -39.9)    Estimated Daily Nutrient Needs:  Energy Requirements Based On: Kcal/kg  Weight Used for Energy Requirements: Current  Energy (kcal/day): 4112-4236 kcal/day (15-17 kcal/kg)  Weight Used for Protein Requirements: Ideal  Protein (g/day): 117-156 g/day (1.5-2.0 g/kg IBW)  Method Used for Fluid Requirements: 1 ml/kcal  Fluid (ml/day): 4092-0131 ml    Nutrition Diagnosis:   Altered nutrition-related lab values related to endocrine dysfuntion as evidenced by lab values    Nutrition Interventions:   Food and/or Nutrient Delivery: Continue Current Diet  Nutrition Education/Counseling: No recommendation at this time  Coordination of Nutrition Care: Continue to monitor while inpatient, Interdisciplinary Rounds    Goals:  Goals: PO intake 50% or greater, prior to discharge    Nutrition Monitoring and Evaluation:   Behavioral-Environmental Outcomes: None Identified  Food/Nutrient Intake Outcomes: Food and Nutrient Intake  Physical Signs/Symptoms Outcomes: Biochemical Data, Nutrition Focused Physical Findings, Skin, Weight, Fluid Status or Edema    Discharge Planning:     Too soon to determine     AlessandraNIECY Donaldson, RDN, LD  Registered [address] [place] [address]  [phone]

## 2023-11-07 NOTE — PLAN OF CARE
Problem: Discharge Planning  Goal: Discharge to home or other facility with appropriate resources  Outcome: Progressing  Flowsheets (Taken 11/6/2023 2015 by Steffi Matt RN)  Discharge to home or other facility with appropriate resources: Identify barriers to discharge with patient and caregiver     Problem: Pain  Goal: Verbalizes/displays adequate comfort level or baseline comfort level  Outcome: Progressing     Problem: Safety - Adult  Goal: Free from fall injury  Outcome: Progressing     Problem: ABCDS Injury Assessment  Goal: Absence of physical injury  Outcome: Progressing     Problem: Chronic Conditions and Co-morbidities  Goal: Patient's chronic conditions and co-morbidity symptoms are monitored and maintained or improved  Outcome: Progressing  Flowsheets (Taken 11/6/2023 2015 by Steffi Matt RN)  Care Plan - Patient's Chronic Conditions and Co-Morbidity Symptoms are Monitored and Maintained or Improved:   Monitor and assess patient's chronic conditions and comorbid symptoms for stability, deterioration, or improvement   Collaborate with multidisciplinary team to address chronic and comorbid conditions and prevent exacerbation or deterioration   Update acute care plan with appropriate goals if chronic or comorbid symptoms are exacerbated and prevent overall improvement and discharge     Problem: Neurosensory - Adult  Goal: Achieves stable or improved neurological status  Outcome: Progressing  Flowsheets (Taken 11/6/2023 2015 by Steffi Matt RN)  Achieves stable or improved neurological status:   Assess for and report changes in neurological status   Initiate measures to prevent increased intracranial pressure     Problem: Respiratory - Adult  Goal: Achieves optimal ventilation and oxygenation  Outcome: Progressing  Flowsheets (Taken 11/6/2023 2015 by Steffi Matt RN)  Achieves optimal ventilation and oxygenation:   Assess for changes in respiratory status   Assess for changes in mentation

## 2023-11-07 NOTE — PROGRESS NOTES
Restrictions  Restrictions/Precautions  Restrictions/Precautions: Weight Bearing, Fall Risk, Up as Tolerated  Required Braces or Orthoses?: Yes (RUE sling)  Upper Extremity Weight Bearing Restrictions  Right Upper Extremity Weight Bearing: Non Weight Bearing  Required Braces or Orthoses  Right Upper Extremity Brace/Splint: Sling  Position Activity Restriction  Other position/activity restrictions: L sided chest drain, be mindful of gait belt placement. Pt easily gets SOB. Per last visit with Dr. Douglas Tam on 11/1/23:  \"Consequently he was instructed to continue on with the sling wear for an additional week at which time he can wean out of his sling and start using the arm for light activities of daily living limiting any lifting pushing or pulling to 1 to 2 pounds at the most.  He was encouraged to start working on his pendulum exercises and he was once again shown how to accomplish this safely. A prescription for physical therapy was provided to begin in 1 week's time. \"    Subjective   General  Patient assessed for rehabilitation services?: Yes  Response to previous treatment: Patient reporting fatigue but able to participate  Family / Caregiver Present: No  Subjective  Subjective: report shoulder pain 5/10 R shoulder  General Comment  Comments: RN OK for therapy. Bed level only due to level of pain and confusion     Social/Functional History         Objective             Observation/Palpation  Observation: R sling in place  Safety Devices  Type of Devices: Call light within reach; Left in bed;Gait belt; All fall risk precautions in place; Patient at risk for falls; Bed alarm in place;Nurse notified  Restraints  Restraints Initially in Place: No    Balance  Sitting: Impaired (CGA static, min A dynamic, B UE support)        ADL  LE Dressing: Increased time to complete;Verbal cueing;Maximum assistance;Dependent/Total  LE Dressing Skilled Clinical Factors: slipper socks, don/doff tab brief, pt able to hike B

## 2023-11-07 NOTE — PROGRESS NOTES
[facility]  Office: [address], DO, Emerald Roberto, DO, Reynajohn Sotelo, DO, Jefe Radha Blood, DO, Wendy Hurd MD, Silviano Weir MD, Mary Jasmine MD, Socrates Bailey MD,  Chico Stearns MD, Granville Aschoff, MD, Feng Serrato MD,  Eliza Richardson MD, Rema Dutta MD, Laura Garza, DO, Martha Snider MD,  Tj Cardoso DO, Stephanie Mcrae MD, Jonh Catalan MD, Alen Mead MD, Hannah Guy MD,  Anuja Snell MD, Chadd Cooney MD, Lelea Mcfarlane MD, Leida Hylton MD, Yomaira Zuñiga MD, Gomez Aleman, DO, Yonis Byers, DO, Myrtle Bear MD,  Lila Jules MD, Yeny Madrigal, Vanna Noland, CNP, Alana Malcolm, CNP,  General [facility], DNP, Maday Russell, CNP, John Hinton, CNP, [facility], CNP, Adonay Muniz, CNP, Panfilo Gagnon, CNP, Gaby Glover, CNP, Pedro Funk, CNS, Jose King, [facility], Devendra Torres, [address] [place] [address]    Progress Note    11/7/2023    6:39 PM    Name:   Mela Victoria  MRN:     [id number]     [address]:      [de-identified]   Room:   [address] [place] [address] Day:  6  Admit Date:  11/1/2023  3:05 PM    PCP:   ANDERSON Alvarez CNP  Code Status:  DNR-CCA    Subjective:     C/C:   Chief Complaint   Patient presents with    Shortness of Breath     Pt arrives dt of sob . Pt does have to get thoracentesis for fluid removal. Pt daughter states he has a new company coming to the house and they have not been removing the usual 1 L of fluid . Interval History Status: improved. Pt seen and examined this morning. No acute events overnight. Sodium this morning 134, creatinine 1.6, nephrology consulted. proBNP trended up to 7420. Resolving 11.7, hemoglobin 8.4. Patient got alteplase/dornase yesterday by pulmonology. Stated he is feeling much better, no respiratory distress.   Clean dressing over left sided pleural catheter    Brief History:     70-year-old male deficit present. Mental Status: He is alert and oriented to person, place, and time. Sensory: No sensory deficit. Motor: No weakness. Psychiatric:         Mood and Affect: Mood normal.         Assessment:     Hospital Problems             Last Modified POA    * (Principal) Pleural effusion 11/1/2023 Yes    Pleural effusion on left 11/2/2023 Yes    Atelectasis, left 11/6/2023 Yes    Chronic diastolic congestive heart failure ([address]) 11/6/2023 Yes    TIERNEY (acute kidney injury) ([address]) 11/6/2023 Yes    Leukocytosis 11/6/2023 Yes    Arterial hypotension 11/7/2023 Yes   Plan:     Worsening shortness of breath due to left pleural effusion c, continue drainage from the pleural drainage catheter, pulmonology is following appreciate input, Lasix on hold due to kidney injury, nephrology was consulted will follow recommendation  Pneumonia?   Repeat x-ray with worsening pleural effusion and consolidation, patient developed leukocytosis and hypotension will start patient on Zosyn, if WBC count continues to trend up and if patient remain hypotensive will consider panculture  Acute kidney injury, will continue to hold Lasix, creatinine continue to rise up, nephrology was consulted today we will wait for recommendation, continue to hold lisinopril  Diastolic heart failure, his recent echo was normal systolic function, continue we will hold Lasix today due to elevated creatinine and worsening kidney function   A-fib status post ablation continue metoprolol, Eliquis  Hypotension patient is asymptomatic we will hold blood pressure medication if systolic less than 571, continue midodrine to support blood pressure, will consider bolus of fluid if MAP remained less than 65, continue IV antibiotic  dyslipidemia continue statin  Depression continue home medication  Elevated troponin type II MI, patient chest pain-free EKG with no acute changes we will continue to monitor  DVT prophylaxis on Eliquis  PT-OT  Cardiac

## 2023-11-07 NOTE — PROGRESS NOTES
Physical Therapy  Facility/Department: [facility] PROGRESSIVE CARE  Physical Therapy Daily Treatment Note    Name: Dg Curiel  : 1945  MRN: [id number]  Date of Service: 2023    Discharge Recommendations:  Patient would benefit from continued therapy after discharge   PT Equipment Recommendations  Equipment Needed: No  Other: Discussed with pt possibilty of uprise seat assist at home if needed      Patient Diagnosis(es): The primary encounter diagnosis was Pleural effusion on left. Diagnoses of Acute on chronic congestive heart failure, unspecified heart failure type (HCC) and Anemia, unspecified type were also pertinent to this visit. Past Medical History:  has a past medical history of A-fib ([address]), Anxiety, Arrhythmia, Depression, Hyperlipidemia, Hypertension, and Obesity. Past Surgical History:  has a past surgical history that includes joint replacement (Left); Tonsillectomy; and Atrial ablation surgery (). Assessment   Body Structures, Functions, Activity Limitations Requiring Skilled Therapeutic Intervention: Decreased functional mobility ; Decreased body mechanics; Decreased strength;Decreased safe awareness;Decreased endurance;Decreased balance  Assessment: Pt only able to do bed mobility this date due to RUE pain and decreased endurance. Pt denied having dizziness/lightheadedness but did report feeling SOB. SpO2 90-91% in room air initially progressing to 94% after several deep breaths. Pt reported feeling easily fatigued; multiple rest breaks given. Sat on EOB for at least 7 minutes, SBA for balance. Pt agreeable to and performed verena LE exs in sitting and in supine requiring rest breaks due to SOB. RUE pendulum exercise not performed due to pt not deemed safe due to decreased endurance. RUE sling continues to be in place. As in past treatments, pt was not deemed safe to return home due to mobility deficits.  Pt will benefit from continued PT for strengthening, safety, balance, endurance and functional mobility training while in the hospital and at discharge. Therapy Prognosis: Good  Decision Making: Medium Complexity  Requires PT Follow-Up: Yes  Activity Tolerance  Activity Tolerance: Patient limited by fatigue;Patient limited by pain; Patient limited by endurance  Activity Tolerance Comments: Pt required multiple rest breaks during exs and mobility training due to SOB and fatigue. SpO2 decreased to 90% in room air with mobility but pt recovered to 94% after resting and performing proper breathing technique. Treatment limited due to RUE pain. Plan   Physcial Therapy Plan  General Plan:  (5-6x/wk)  Current Treatment Recommendations: Strengthening, Balance training, Functional mobility training, Transfer training, Endurance training, Gait training, Stair training, Home exercise program, Safety education & training, Patient/Caregiver education & training, Therapeutic activities  Additional Comments: pendulum exercises to R shoulder  Safety Devices  Type of Devices: Call light within reach, Left in bed, Gait belt, All fall risk precautions in place, Patient at risk for falls, Bed alarm in place, Nurse notified  Restraints  Restraints Initially in Place: No     Restrictions  Restrictions/Precautions  Restrictions/Precautions: Weight Bearing, Fall Risk, Up as Tolerated  Required Braces or Orthoses?: Yes (RUE sling)  Upper Extremity Weight Bearing Restrictions  Right Upper Extremity Weight Bearing: Non Weight Bearing  Required Braces or Orthoses  Right Upper Extremity Brace/Splint: Sling  Position Activity Restriction  Other position/activity restrictions: L sided chest drain, be mindful of gait belt placement. Pt easily gets SOB.  Per last visit with Dr. Priyanka Harris on 11/1/23:  \"Consequently he was instructed to continue on with the sling wear for an additional week at which time he can wean out of his sling and start using the arm for light activities of daily living limiting any lifting pushing

## 2023-11-07 NOTE — CONSULTS
Renal Consult Note    Patient :  Rickie Steiner; 66 y.o. MRN# [id number]  Location:  317/317-01  Attending:  Jake Amos MD  Admit Date:  11/1/2023   Hospital Day: 6    Reason for Consult:     Asked by Dr Jake Amos MD to see for TIERNEY/Elevated Creatinine. History Obtained From:     Patient, electronic medical record    History of Present Illness:     Rickie Steiner; 66 y.o. male with past medical history of diastolic heart failure, left-sided pleural effusion gets regular thoracentesis done, A-fib, hypertension, depression presented to the hospital with the chief complaint of shortness of breath. Patient has left pleural effusion and has had drainage catheter requiring drainage twice a week. Pulmonology has been consulted and patient is status post IR guided intrapleural tPA on 11/3/2023 and has had 650 cc of fluid removal this admission. IR has been consulted for Pleurx catheter placement. Patient's creatinine had increased to 1.9 mg/dl yesterday, BMP result from today showed sodium 134, potassium 4.6, chloride 100, bicarb 22, calcium 9.2, BUN 53, creatinine 1.6 mg/dl. Patient did have hypotension with systolics as low as low 42L this admission, now improving. Patient had been receiving Lasix, Aldactone along with lisinopril 5 mg daily which was stopped about 2 days ago. Currently on Midodrine. Baseline creatinine seems to be normal.  Nephrology is consulted to acute kidney injury. No history of recent contrast exposure, No h/o prolonged NSAIDs use in the past, No h/o nephrolithiasis, No recent skin rashes or arthralgias, No hematuria or pyuria noticed in the recent past. Doesn't report any reduction in the urine output recently. Non report of any obstructive urinary symptoms (urgency, frequency, weak stream, straining while urination). No h/o recurrent UTIs in the past.    Past History/Allergies? Social History:     Past Medical History:   Diagnosis Date    A-fib [facility])     Anxiety

## 2023-11-08 ENCOUNTER — APPOINTMENT (OUTPATIENT)
Dept: CT IMAGING | Age: 78
End: 2023-11-08
Payer: MEDICARE

## 2023-11-08 LAB
ANION GAP SERPL CALCULATED.3IONS-SCNC: 12 MMOL/L (ref 9–17)
BUN SERPL-MCNC: 40 MG/DL (ref 8–23)
C3 SERPL-MCNC: 163 MG/DL (ref 90–180)
C4 SERPL-MCNC: 26 MG/DL (ref 10–40)
CALCIUM SERPL-MCNC: 9.3 MG/DL (ref 8.6–10.4)
CHLORIDE SERPL-SCNC: 100 MMOL/L (ref 98–107)
CO2 SERPL-SCNC: 22 MMOL/L (ref 20–31)
CREAT SERPL-MCNC: 1.3 MG/DL (ref 0.7–1.2)
FREE KAPPA/LAMBDA RATIO: 1.4 (ref 0.26–1.65)
GFR SERPL CREATININE-BSD FRML MDRD: 56 ML/MIN/1.73M2
GLUCOSE SERPL-MCNC: 100 MG/DL (ref 70–99)
HAV IGM SERPL QL IA: NONREACTIVE
HBV CORE IGM SERPL QL IA: NONREACTIVE
HBV SURFACE AG SERPL QL IA: NONREACTIVE
HCV AB SERPL QL IA: NONREACTIVE
KAPPA LC FREE SER-MCNC: 51.9 MG/L (ref 3.7–19.4)
LAMBDA LC FREE SERPL-MCNC: 37.1 MG/L (ref 5.7–26.3)
P E INTERPRETATION, U: NORMAL
PATHOLOGIST: NORMAL
POTASSIUM SERPL-SCNC: 4.1 MMOL/L (ref 3.7–5.3)
SODIUM SERPL-SCNC: 134 MMOL/L (ref 135–144)
SPECIMEN TYPE: NORMAL
URINE TOTAL PROTEIN: 12 MG/DL

## 2023-11-08 PROCEDURE — 80048 BASIC METABOLIC PNL TOTAL CA: CPT

## 2023-11-08 PROCEDURE — 86334 IMMUNOFIX E-PHORESIS SERUM: CPT

## 2023-11-08 PROCEDURE — 86160 COMPLEMENT ANTIGEN: CPT

## 2023-11-08 PROCEDURE — 99233 SBSQ HOSP IP/OBS HIGH 50: CPT | Performed by: INTERNAL MEDICINE

## 2023-11-08 PROCEDURE — 83521 IG LIGHT CHAINS FREE EACH: CPT

## 2023-11-08 PROCEDURE — 6370000000 HC RX 637 (ALT 250 FOR IP): Performed by: HOSPITALIST

## 2023-11-08 PROCEDURE — 97110 THERAPEUTIC EXERCISES: CPT

## 2023-11-08 PROCEDURE — 2580000003 HC RX 258: Performed by: STUDENT IN AN ORGANIZED HEALTH CARE EDUCATION/TRAINING PROGRAM

## 2023-11-08 PROCEDURE — 6370000000 HC RX 637 (ALT 250 FOR IP): Performed by: INTERNAL MEDICINE

## 2023-11-08 PROCEDURE — 1200000000 HC SEMI PRIVATE

## 2023-11-08 PROCEDURE — 99232 SBSQ HOSP IP/OBS MODERATE 35: CPT | Performed by: INTERNAL MEDICINE

## 2023-11-08 PROCEDURE — 84155 ASSAY OF PROTEIN SERUM: CPT

## 2023-11-08 PROCEDURE — 97116 GAIT TRAINING THERAPY: CPT

## 2023-11-08 PROCEDURE — 2580000003 HC RX 258: Performed by: HOSPITALIST

## 2023-11-08 PROCEDURE — 86225 DNA ANTIBODY NATIVE: CPT

## 2023-11-08 PROCEDURE — 84165 PROTEIN E-PHORESIS SERUM: CPT

## 2023-11-08 PROCEDURE — 80074 ACUTE HEPATITIS PANEL: CPT

## 2023-11-08 PROCEDURE — 36415 COLL VENOUS BLD VENIPUNCTURE: CPT

## 2023-11-08 PROCEDURE — 6360000002 HC RX W HCPCS: Performed by: STUDENT IN AN ORGANIZED HEALTH CARE EDUCATION/TRAINING PROGRAM

## 2023-11-08 PROCEDURE — 97535 SELF CARE MNGMENT TRAINING: CPT

## 2023-11-08 PROCEDURE — 6370000000 HC RX 637 (ALT 250 FOR IP): Performed by: NURSE PRACTITIONER

## 2023-11-08 PROCEDURE — 99232 SBSQ HOSP IP/OBS MODERATE 35: CPT | Performed by: STUDENT IN AN ORGANIZED HEALTH CARE EDUCATION/TRAINING PROGRAM

## 2023-11-08 PROCEDURE — 71250 CT THORAX DX C-: CPT

## 2023-11-08 PROCEDURE — 86038 ANTINUCLEAR ANTIBODIES: CPT

## 2023-11-08 RX ADMIN — PIPERACILLIN AND TAZOBACTAM 3375 MG: 3; .375 INJECTION, POWDER, LYOPHILIZED, FOR SOLUTION INTRAVENOUS at 12:18

## 2023-11-08 RX ADMIN — MELATONIN 3 MG: 3 TAB ORAL at 22:51

## 2023-11-08 RX ADMIN — ESCITALOPRAM OXALATE 20 MG: 10 TABLET ORAL at 08:29

## 2023-11-08 RX ADMIN — SODIUM CHLORIDE, PRESERVATIVE FREE 10 ML: 5 INJECTION INTRAVENOUS at 08:31

## 2023-11-08 RX ADMIN — PIPERACILLIN AND TAZOBACTAM 3375 MG: 3; .375 INJECTION, POWDER, LYOPHILIZED, FOR SOLUTION INTRAVENOUS at 04:10

## 2023-11-08 RX ADMIN — FOLIC ACID 1 MG: 1 TABLET ORAL at 08:31

## 2023-11-08 RX ADMIN — LEVOTHYROXINE SODIUM 150 MCG: 75 TABLET ORAL at 06:53

## 2023-11-08 RX ADMIN — ACETAMINOPHEN 650 MG: 325 TABLET ORAL at 12:09

## 2023-11-08 RX ADMIN — CYANOCOBALAMIN TAB 500 MCG 500 MCG: 500 TAB at 08:30

## 2023-11-08 RX ADMIN — FENOFIBRATE 160 MG: 160 TABLET ORAL at 08:28

## 2023-11-08 RX ADMIN — APIXABAN 5 MG: 5 TABLET, FILM COATED ORAL at 08:30

## 2023-11-08 RX ADMIN — PIPERACILLIN AND TAZOBACTAM 3375 MG: 3; .375 INJECTION, POWDER, LYOPHILIZED, FOR SOLUTION INTRAVENOUS at 20:34

## 2023-11-08 RX ADMIN — Medication 400 MG: at 08:31

## 2023-11-08 RX ADMIN — Medication 5000 UNITS: at 08:28

## 2023-11-08 RX ADMIN — ACETAMINOPHEN 650 MG: 325 TABLET ORAL at 18:36

## 2023-11-08 RX ADMIN — APIXABAN 5 MG: 5 TABLET, FILM COATED ORAL at 20:31

## 2023-11-08 RX ADMIN — MIDODRINE HYDROCHLORIDE 5 MG: 5 TABLET ORAL at 08:30

## 2023-11-08 RX ADMIN — MIDODRINE HYDROCHLORIDE 5 MG: 5 TABLET ORAL at 12:09

## 2023-11-08 RX ADMIN — ROSUVASTATIN 20 MG: 20 TABLET, FILM COATED ORAL at 20:31

## 2023-11-08 ASSESSMENT — ENCOUNTER SYMPTOMS
GASTROINTESTINAL NEGATIVE: 1
NAUSEA: 0
VOMITING: 0
BACK PAIN: 1
BACK PAIN: 0
VOICE CHANGE: 0
SHORTNESS OF BREATH: 1
ABDOMINAL PAIN: 0

## 2023-11-08 ASSESSMENT — PAIN DESCRIPTION - ORIENTATION: ORIENTATION: RIGHT

## 2023-11-08 ASSESSMENT — PAIN SCALES - GENERAL
PAINLEVEL_OUTOF10: 6
PAINLEVEL_OUTOF10: 2

## 2023-11-08 ASSESSMENT — PAIN DESCRIPTION - DESCRIPTORS: DESCRIPTORS: THROBBING

## 2023-11-08 ASSESSMENT — PAIN - FUNCTIONAL ASSESSMENT: PAIN_FUNCTIONAL_ASSESSMENT: PREVENTS OR INTERFERES SOME ACTIVE ACTIVITIES AND ADLS

## 2023-11-08 ASSESSMENT — PAIN DESCRIPTION - LOCATION: LOCATION: SHOULDER

## 2023-11-08 NOTE — PROGRESS NOTES
Physical Therapy  Facility/Department: [facility] PROGRESSIVE CARE  Physical Therapy Daily Documentation Note    Name: Jina Box  : 1945  MRN: [id number]  Date of Service: 2023    Discharge Recommendations:  Patient would benefit from continued therapy after discharge   PT Equipment Recommendations  Equipment Needed: No  Other: Discussed with pt possibilty of uprise seat assist at home if needed      Patient Diagnosis(es): The primary encounter diagnosis was Pleural effusion on left. Diagnoses of Acute on chronic congestive heart failure, unspecified heart failure type (HCC) and Anemia, unspecified type were also pertinent to this visit. Past Medical History:  has a past medical history of A-fib ([address]), Anxiety, Arrhythmia, Depression, Hyperlipidemia, Hypertension, and Obesity. Past Surgical History:  has a past surgical history that includes joint replacement (Left); Tonsillectomy; and Atrial ablation surgery (). Assessment   Body Structures, Functions, Activity Limitations Requiring Skilled Therapeutic Intervention: Decreased functional mobility ; Decreased body mechanics; Decreased strength;Decreased safe awareness;Decreased endurance;Decreased balance  Assessment: Pt requires hospital bed and mod A for bed mobility. He required mod A transfers with quad cane but improved to min A with hemiwalker. Pt required mod A gait 4ft with quad cane with poor safety and posterior leaning but improved with hemiwalker to min A for 4ft. He needs further gait training with hemiwalker as treatment limited due to breakfast and pt  decreased safety awareness & anxiousness. RUE sling continues to be in place for mobility at 7/10 pain and pt poor safety awareness and ability to consistently follow instruction. However, he was instructed to support R UE on pillow if resting in bed or chair with sling undone. As in past treatments, pt was not deemed safe to return home due to mobility deficits.  Pt will

## 2023-11-08 NOTE — PROGRESS NOTES
PULMONARY & CRITICAL CARE MEDICINE PROGRESS NOTE     Patient:  Shana Simms  MRN: [id number]  [address] [facility] date: 11/1/2023  Primary Care Physician: ANDERSON Marsh - CNP  Consulting Physician: Noa Kelly MD  CODE Status: DNR-CCA  LOS: 7     SUBJECTIVE     CHIEF COMPLAINT/REASON FOR INITIAL CONSULT:Shortness of Breath (Pt arrives dt of sob . Pt does have to get thoracentesis for fluid removal. Pt daughter states he has a new company coming to the house and they have not been removing the usual 1 L of fluid . )    Olaf:  The patient is a 66 y.o. male with a left pleural effusion for the past 4 months for which the patient has had pleural drainage catheter and requiring drainage twice a week. He was informed that the pleural effusion was related to heart failure. Review of the echocardiogram done on 8/8/2023 shows he has a normal ejection fraction. There was no comment regarding diastolic heart failure.   Review of the patient's records show that at [facility] he was diagnosed with diastolic congestive heart failure  Patient with history of cardiac surgery in 2020 and 2021  No history of malignancy  No hypothyroidism  No exposure to tuberculosis, asbestos  Not on any medication that could lead to pleural effusion  No history of DVT or pulmonary embolism  Patient with known history of atrial fibrillation status post ablation few years ago and on examination he appears to be in a regular rhythm  No trauma to the chest  No fever chills or night sweats  No weight loss or loss of appetite  Pleural fluid analysis showed the pleural effusion to be a transudate with 83% lymphocytes and cytology being negative for malignancy  Patient apparently has home care company that is been helping remove the fluid twice a week, but the company has changed recently and according to the patient he feels the new nursing staff has not been an adequate drainage hence leading to his symptoms    INTERVAL appearing left pleural catheter. 3. Findings of congestive heart failure in the right lung with perivascular   haziness and peribronchial cuffing. 4. Cardiac silhouette is predominantly obscured but appears enlarged. 5.  Calcific atherosclerotic disease aorta. XR CHEST PORTABLE   Final Result   Moderate to large left pleural effusion with atelectasis of the left lung,   not significantly changed. Pulmonary edema. XR CHEST PORTABLE   Final Result   Increased opacity over the left chest and loss of volume likely related to   infiltrates and/or pleural disease. Left chest tube. The right lung   demonstrates no acute disease. IR TRANSCATH INFUSION THROMB NONCORONARY    (Results Pending)   CT CHEST WO CONTRAST    (Results Pending)        ECHOCARDIOGRAM:   No results found for this or any previous visit. ASSESSMENT AND PLAN     PROBLEM LIST:    Patient Active Problem List   Diagnosis    Hyperlipidemia    Essential hypertension    Status post total hip replacement, left    Alcohol abuse    Depression    PAF (paroxysmal atrial fibrillation) (HCC)    Status post catheter ablation of atrial fibrillation    Long term current use of antiarrhythmic drug    Long term (current) use of anticoagulants    Macrocytic anemia    Chronic systolic congestive heart failure, NYHA class 3 (HCC)    Recurrent pleural effusion    Pleural effusion    Pleural effusion on left    Atelectasis, left    Chronic diastolic congestive heart failure (HCC)    TIERNEY (acute kidney injury) ([address])    Leukocytosis    Arterial hypotension       ASSESSMENT:    Recurrent left pleural effusion  S/p indwelling tunneled pleural catheter (Pleurx), s/p intrapleural tPA/dornase x2  CHF  PAF  Essential HTN  Dyslipidemia  Obesity    PLAN:    I personally interviewed/examined the patient; reviewed interval history, interpreted all available radiographic and laboratory data at the time of service.     Patient currently on room air  Chest

## 2023-11-08 NOTE — PROGRESS NOTES
Renal Progress Note    Patient :  Dg Curiel; 66 y.o. MRN# [id number]  Location:  317/317-01  Attending:  Ilsa Abraham MD  Admit Date:  11/1/2023   Hospital Day: 7    Subjective:     Patient was seen and examined. No new issues reported overnight. Patient has Pleurx catheter in place and given Midodrine 3 times weekly as per pulmonology. BMP results from today reviewed sodium 134, potassium 4.1, chloride 100, bicarb 22, calcium 9.3, BUN 40, creatinine did improve to 1.3 mg/dl. Blood pressure stable. Urine output documented as about 1.3 L in the last 24 hours. Currently diuretics and ACE inhibitors both are on hold. Serology 11/18/2023 complement levels and hepatitis panel both negative. K/L 1.40. Rest serology pending. UA trace leuk esterase, WBC 2-5, RBC none. UPC 0.18. Renal ultrasound 11/7/2023: Right kidney 10.4 cm and left kidney 12.6 cm. Impression: No demonstratable abnormality on ultrasound study of both kidneys. Outpatient Medications:     Medications Prior to Admission: vitamin D (CHOLECALCIFEROL) 125 MCG (5000 UT) CAPS capsule, Take 1 capsule by mouth daily  spironolactone (ALDACTONE) 25 MG tablet, Take 1 tablet by mouth daily  metoprolol succinate (TOPROL XL) 100 MG extended release tablet, Take 1 tablet by mouth nightly  folic acid (FOLVITE) 1 MG tablet, Take 1 tablet by mouth daily  fenofibrate (TRIGLIDE) 160 MG tablet, Take 1 tablet by mouth daily  amLODIPine (NORVASC) 5 MG tablet, Take 1 tablet by mouth daily  rosuvastatin (CRESTOR) 20 MG tablet, Take 1 tablet by mouth daily  magnesium oxide (MAG-OX) 400 MG tablet, Take 1 tablet by mouth daily  escitalopram (LEXAPRO) 20 MG tablet, Take 1 tablet by mouth daily  levothyroxine (SYNTHROID) 150 MCG tablet, Take 1 tablet by mouth Daily  apixaban (ELIQUIS) 5 MG TABS tablet, Take 1 tablet by mouth 2 times daily Take 1 tablet by mouth in the morning and 1 tablet before bedtime.   vitamin B-12 (CYANOCOBALAMIN) 500 MCG tablet, Take 1 tablet

## 2023-11-08 NOTE — PROGRESS NOTES
Occupational Therapy  Facility/Department: [facility] [address] Daily Treatment Note     Name: Luisito Lynch  : 1945  MRN: [id number]  Date of Service: 2023    Discharge Recommendations:  Patient would benefit from continued therapy after discharge  OT Equipment Recommendations  Other: continue to assess       Patient Diagnosis(es): The primary encounter diagnosis was Pleural effusion on left. Diagnoses of Acute on chronic congestive heart failure, unspecified heart failure type (HCC) and Anemia, unspecified type were also pertinent to this visit. Past Medical History:  has a past medical history of A-fib ([address]), Anxiety, Arrhythmia, Depression, Hyperlipidemia, Hypertension, and Obesity. Past Surgical History:  has a past surgical history that includes joint replacement (Left); Tonsillectomy; and Atrial ablation surgery (). Assessment   Performance deficits / Impairments: Decreased functional mobility ; Decreased safe awareness;Decreased balance;Decreased ADL status; Decreased strength  Assessment: Patient progressing towards STGs. Patient continues to demonstrate above deficits. Patient would benefit from skilled OT while at hospital addressing above deficits to maximize safety and independence. Patient is currently unsafe to return to prior living arrangements at this time and woud benefit from skilled OT following discharge. Prognosis: Good  REQUIRES OT FOLLOW-UP: Yes  Activity Tolerance  Activity Tolerance: Patient limited by pain; Patient limited by fatigue  Activity Tolerance Comments: note moderate SOB throughout treatment        Plan   Occupational Therapy Plan  Times Per Week: 5-6x/wk  Current Treatment Recommendations: Balance training, Functional mobility training, Gait training, Patient/Caregiver education & training, Equipment evaluation, education, & procurement, Endurance training, Safety education & training, Self-Care / ADL required to identify errors made;Assistance required to implement solutions;Decreased awareness of errors  Insights: Decreased awareness of deficits  Initiation: Requires cues for some  Sequencing: Requires cues for some  Cognition Comment: anxious with mobility with decreased safety technique even with cues  Orientation  Overall Orientation Status: Within Normal Limits  Orientation Level: Oriented X4               Exercise Treatment: completed pendulum ex to R UE, max tactile cues to execute each movement appropriately x 5-7 reps  Education Given To: Patient  Education Provided: Transfer Training;Equipment;ADL Adaptive Strategies; Fall Prevention Strategies;Precautions  Education Method: Demonstration;Verbal  Education Outcome: Verbalized understanding;Continued education needed                AM-PAC Score        AM-PAC Inpatient Daily Activity Raw Score: 14 (11/08/23 1103)  AM-PAC Inpatient ADL T-Scale Score : 33.39 (11/08/23 1103)  ADL Inpatient CMS 0-100% Score: 59.67 (11/08/23 1103)  ADL Inpatient CMS G-Code Modifier : CK (11/08/23 1103)           Goals  Short Term Goals  Time Frame for Short Term Goals: 14 visits  Short Term Goal 1: Patient will complete UB ADLs/grooming with MOD IND  Short Term Goal 2: Pt will complete LB ADLs/toileting with MOD IND  Short Term Goal 3: Pt will complete functional mobility/transfers with MOD IND in prep for ADL completion  Short Term Goal 4: Pt will complete 15 min ADL/functional task with use of energy conservation techniques without cues to increase activity tolerance.   Patient Goals   Patient goals : decrease pain       Therapy Time   Individual Concurrent Group Co-treatment   Time In 9290         Time Out 4968         Minutes 27         Timed Code Treatment Minutes: [address] Zarate OT

## 2023-11-09 LAB
ANION GAP SERPL CALCULATED.3IONS-SCNC: 11 MMOL/L (ref 9–17)
BUN SERPL-MCNC: 28 MG/DL (ref 8–23)
CALCIUM SERPL-MCNC: 9.1 MG/DL (ref 8.6–10.4)
CHLORIDE SERPL-SCNC: 100 MMOL/L (ref 98–107)
CO2 SERPL-SCNC: 22 MMOL/L (ref 20–31)
CREAT SERPL-MCNC: 1 MG/DL (ref 0.7–1.2)
GFR SERPL CREATININE-BSD FRML MDRD: >60 ML/MIN/1.73M2
GLUCOSE SERPL-MCNC: 102 MG/DL (ref 70–99)
POTASSIUM SERPL-SCNC: 3.9 MMOL/L (ref 3.7–5.3)
SODIUM SERPL-SCNC: 133 MMOL/L (ref 135–144)

## 2023-11-09 PROCEDURE — 99232 SBSQ HOSP IP/OBS MODERATE 35: CPT | Performed by: STUDENT IN AN ORGANIZED HEALTH CARE EDUCATION/TRAINING PROGRAM

## 2023-11-09 PROCEDURE — 80048 BASIC METABOLIC PNL TOTAL CA: CPT

## 2023-11-09 PROCEDURE — 99233 SBSQ HOSP IP/OBS HIGH 50: CPT | Performed by: INTERNAL MEDICINE

## 2023-11-09 PROCEDURE — 97535 SELF CARE MNGMENT TRAINING: CPT

## 2023-11-09 PROCEDURE — 1200000000 HC SEMI PRIVATE

## 2023-11-09 PROCEDURE — 6370000000 HC RX 637 (ALT 250 FOR IP): Performed by: NURSE PRACTITIONER

## 2023-11-09 PROCEDURE — 6360000002 HC RX W HCPCS: Performed by: STUDENT IN AN ORGANIZED HEALTH CARE EDUCATION/TRAINING PROGRAM

## 2023-11-09 PROCEDURE — 6370000000 HC RX 637 (ALT 250 FOR IP): Performed by: HOSPITALIST

## 2023-11-09 PROCEDURE — 36415 COLL VENOUS BLD VENIPUNCTURE: CPT

## 2023-11-09 PROCEDURE — 6370000000 HC RX 637 (ALT 250 FOR IP): Performed by: STUDENT IN AN ORGANIZED HEALTH CARE EDUCATION/TRAINING PROGRAM

## 2023-11-09 PROCEDURE — 99232 SBSQ HOSP IP/OBS MODERATE 35: CPT | Performed by: INTERNAL MEDICINE

## 2023-11-09 PROCEDURE — 97110 THERAPEUTIC EXERCISES: CPT

## 2023-11-09 PROCEDURE — 6370000000 HC RX 637 (ALT 250 FOR IP): Performed by: INTERNAL MEDICINE

## 2023-11-09 PROCEDURE — 2580000003 HC RX 258: Performed by: HOSPITALIST

## 2023-11-09 PROCEDURE — 97116 GAIT TRAINING THERAPY: CPT

## 2023-11-09 PROCEDURE — 2580000003 HC RX 258: Performed by: STUDENT IN AN ORGANIZED HEALTH CARE EDUCATION/TRAINING PROGRAM

## 2023-11-09 RX ORDER — TORSEMIDE 20 MG/1
20 TABLET ORAL DAILY
Status: DISCONTINUED | OUTPATIENT
Start: 2023-11-09 | End: 2023-11-10

## 2023-11-09 RX ORDER — METOPROLOL SUCCINATE 25 MG/1
25 TABLET, EXTENDED RELEASE ORAL NIGHTLY
Status: DISCONTINUED | OUTPATIENT
Start: 2023-11-09 | End: 2023-11-10 | Stop reason: HOSPADM

## 2023-11-09 RX ADMIN — APIXABAN 5 MG: 5 TABLET, FILM COATED ORAL at 09:27

## 2023-11-09 RX ADMIN — MIDODRINE HYDROCHLORIDE 5 MG: 5 TABLET ORAL at 13:40

## 2023-11-09 RX ADMIN — MIDODRINE HYDROCHLORIDE 5 MG: 5 TABLET ORAL at 09:29

## 2023-11-09 RX ADMIN — ROSUVASTATIN 20 MG: 20 TABLET, FILM COATED ORAL at 20:24

## 2023-11-09 RX ADMIN — FENOFIBRATE 160 MG: 160 TABLET ORAL at 09:28

## 2023-11-09 RX ADMIN — LEVOTHYROXINE SODIUM 150 MCG: 75 TABLET ORAL at 05:51

## 2023-11-09 RX ADMIN — PIPERACILLIN AND TAZOBACTAM 3375 MG: 3; .375 INJECTION, POWDER, LYOPHILIZED, FOR SOLUTION INTRAVENOUS at 04:02

## 2023-11-09 RX ADMIN — MELATONIN 3 MG: 3 TAB ORAL at 20:24

## 2023-11-09 RX ADMIN — APIXABAN 5 MG: 5 TABLET, FILM COATED ORAL at 20:24

## 2023-11-09 RX ADMIN — ESCITALOPRAM OXALATE 20 MG: 10 TABLET ORAL at 09:28

## 2023-11-09 RX ADMIN — FOLIC ACID 1 MG: 1 TABLET ORAL at 09:28

## 2023-11-09 RX ADMIN — Medication 400 MG: at 09:27

## 2023-11-09 RX ADMIN — TORSEMIDE 20 MG: 20 TABLET ORAL at 13:40

## 2023-11-09 RX ADMIN — Medication 5000 UNITS: at 09:29

## 2023-11-09 RX ADMIN — ACETAMINOPHEN 650 MG: 325 TABLET ORAL at 18:46

## 2023-11-09 RX ADMIN — CYANOCOBALAMIN TAB 500 MCG 500 MCG: 500 TAB at 09:29

## 2023-11-09 RX ADMIN — PIPERACILLIN AND TAZOBACTAM 3375 MG: 3; .375 INJECTION, POWDER, LYOPHILIZED, FOR SOLUTION INTRAVENOUS at 13:46

## 2023-11-09 RX ADMIN — PIPERACILLIN AND TAZOBACTAM 3375 MG: 3; .375 INJECTION, POWDER, LYOPHILIZED, FOR SOLUTION INTRAVENOUS at 20:23

## 2023-11-09 RX ADMIN — SODIUM CHLORIDE, PRESERVATIVE FREE 10 ML: 5 INJECTION INTRAVENOUS at 09:42

## 2023-11-09 RX ADMIN — METOPROLOL SUCCINATE 25 MG: 25 TABLET, EXTENDED RELEASE ORAL at 20:24

## 2023-11-09 RX ADMIN — ACETAMINOPHEN 650 MG: 325 TABLET ORAL at 09:27

## 2023-11-09 ASSESSMENT — PAIN SCALES - WONG BAKER
WONGBAKER_NUMERICALRESPONSE: 0

## 2023-11-09 ASSESSMENT — ENCOUNTER SYMPTOMS
VOMITING: 0
BACK PAIN: 0
BACK PAIN: 1
NAUSEA: 0
GASTROINTESTINAL NEGATIVE: 1
SHORTNESS OF BREATH: 1
VOICE CHANGE: 0
ABDOMINAL PAIN: 0

## 2023-11-09 ASSESSMENT — PAIN SCALES - GENERAL
PAINLEVEL_OUTOF10: 3
PAINLEVEL_OUTOF10: 0
PAINLEVEL_OUTOF10: 3
PAINLEVEL_OUTOF10: 3
PAINLEVEL_OUTOF10: 0

## 2023-11-09 ASSESSMENT — PAIN DESCRIPTION - ORIENTATION: ORIENTATION: RIGHT

## 2023-11-09 ASSESSMENT — PAIN - FUNCTIONAL ASSESSMENT: PAIN_FUNCTIONAL_ASSESSMENT: PREVENTS OR INTERFERES SOME ACTIVE ACTIVITIES AND ADLS

## 2023-11-09 ASSESSMENT — PAIN DESCRIPTION - DESCRIPTORS: DESCRIPTORS: DISCOMFORT

## 2023-11-09 NOTE — PROGRESS NOTES
[facility]  PROGRESS NOTE    Room # 317/317-01   Name: Jeff Bell           Reason for visit: Routine    I visited the patient. Admit Date & Time: 11/1/2023  3:05 PM    Assessment:  Jeff Bell is a 66 y.o. male in the hospital because \"pleural effusion\". Upon entering the room patient was lying in bed. Patient shared about his current medical challenges. Patient appeared to be calm and coping. Patient welcomed prayer. Patient requested this writer to pray for his \"brother and sister-in-law\" as well as for him. Intervention:  I introduced myself and my title as  I offered space for patient  to express feelings, needs, and concerns and provided a ministry presence. This writer actively listened to patient. This writer offered a prayer for patient and patient's family. Outcome:  Patient expressed gratitude for this  visit     Plan:  Chaplains will remain available to offer spiritual and emotional support as needed. Electronically signed by Lindsey Garrido on 11/9/2023 at 2:19 PM.  [address] [place] [address] Drive        11/09/23 1417   Encounter Summary   Encounter Overview/Reason  Follow-up   Service Provided For: Patient   Referral/Consult From:  [address] Family members   Last Encounter  11/09/23   Complexity of Encounter Moderate   Begin Time 1405   End Time  1415   Total Time Calculated 10 min   Spiritual/Emotional needs   Type Spiritual Support   Assessment/Intervention/Outcome   Assessment Calm;Coping   Intervention Active listening;Sustaining Presence/Ministry of presence; Explored/Affirmed feelings, thoughts, concerns;Prayer (assurance of)/[place];Nurtured Hope   Outcome Engaged in conversation;Coping;Expressed Gratitude;Expressed feelings, needs, and concerns   Plan and Referrals   Plan/Referrals Continue Support (comment)

## 2023-11-09 NOTE — DISCHARGE INSTR - COC
Continuity of Care Form    Patient Name: Jerome Kearns   :    MRN:  [id number]    Admit date:  2023  Discharge date:  11/10/2023    Code Status Order: DNR-CCA   Advance Directives:     Admitting Physician:  Rob Roper MD  PCP: ANDERSON Hameed CNP    Discharging Nurse: [facility] Unit/Room#: 317/317-01  Discharging Unit Phone Number: [phone]    Emergency Contact:   Extended Emergency Contact Information  Primary Emergency Contact: [address] [facility] Phone: [phone]  Relation: Child  Secondary Emergency Contact: [address] [facility] Phone: [phone]  Relation: Child  Preferred language: English    Past Surgical History:  Past Surgical History:   Procedure Laterality Date    ATRIAL ABLATION SURGERY  2013    JOINT REPLACEMENT Left     TONSILLECTOMY         Immunization History:   Immunization History   Administered Date(s) Administered    COVID-19, MODERNA BLUE border, Primary or Immunocompromised, (age 12y+), IM, 100 mcg/0.5mL 2021, 2021, 2022    Influenza, FLUAD, (age 72 y+), Adjuvanted, 0.5mL 2023    Pneumococcal, PCV20, PREVNAR 21, (age 6w+), IM, 0.5mL 2023    Pneumococcal, PPSV23, PNEUMOVAX 21, (age 2y+), SC/IM, 0.5mL 2022       Active Problems:  Patient Active Problem List   Diagnosis Code    Hyperlipidemia E78.5    Essential hypertension I10    Status post total hip replacement, 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 A    PAF (paroxysmal atrial fibrillation) ([facility]) I48.0    Status post catheter ablation of atrial fibrillation Z98.890    Long term current use of antiarrhythmic drug Z79.899    Long term (current) use of anticoagulants Z79.01    Macrocytic anemia D53.9    Chronic systolic congestive heart failure, NYHA class 3 ([facility]) I50.22    Recurrent pleural effusion J90    Pleural effusion J90    Pleural effusion on left J90    Atelectasis, left J98.11    Chronic diastolic congestive heart

## 2023-11-09 NOTE — PROGRESS NOTES
[facility]  Office: [address], DO, Biju Trujillo, DO, Yashira Jacobo, DO, Blade Delmi Martinez, DO, Varun De La Garza MD, Agustin Tierney MD, Vesna Conley MD, Natasha Lynn MD,  Quinten Chanel MD, Elo Panchal MD, Sergo Gage MD,  Claudia Eid MD, Wilmar Butler MD, Julio Ibrahim DO, Ruben George MD,  Luciana Neely DO, Gerri Beauchamp MD, Millicent Mckeon MD, Salina Cole MD, Dorene Escobedo MD,  Evan Taylor MD, Carolin Richard MD, Efraín Melchor MD, Adelaide Abraham MD, Tatiana Vilchis MD, Nyla Martin DO, Sierra Michele DO, Essie Parmar MD,  Wicho Charlton MD, Maday Maldonado, WENDY,  Carlee Hammond, CNP, Lynda Gottron, CNP,  Jalil Pate, [facility], Yael Santos, CNP, Samantha Cole CNP, Yolanda Pena, CNP, Caleb Polanco, CNP, Jagjit Mcdaniel, CNP, Claudina Scheuermann, CNP, Meño Perera, [facility], Afsaneh Mccullough, [facility], Maday Mohs, [facility] [address]    Progress Note    11/9/2023    2:36 PM    Name:   Mya Chavez  MRN:     [id number]     [address] [facility] [address] [facility] [address]:      [de-identified]   Room:   [address] [place] [address] Day:  8  Admit Date:  11/1/2023  3:05 PM    PCP:   ANDERSON Gamez CNP  Code Status:  DNR-CCA    Subjective:     C/C:   Chief Complaint   Patient presents with    Shortness of Breath     Pt arrives dt of sob . Pt does have to get thoracentesis for fluid removal. Pt daughter states he has a new company coming to the house and they have not been removing the usual 1 L of fluid . Interval History Status: improved. Patient seen and examined this morning. Sitting on the side of the bed, is upset as he dropped his phone on the floor and is trying to reach out to the call light which is not anywhere close to him. Patient stated that he needs help getting up. Otherwise doing well, creatinine 1 this morning. CT chest showed partially loculated left pleural effusion with drain in place.   Brief History: (Principal) Pleural effusion 11/1/2023 Yes    Pleural effusion on left 11/2/2023 Yes    Atelectasis, left 11/6/2023 Yes    Chronic diastolic congestive heart failure ([address]) 11/6/2023 Yes    TIERNEY (acute kidney injury) ([address]) 11/6/2023 Yes    Leukocytosis 11/6/2023 Yes    Arterial hypotension 11/7/2023 Yes   Plan:     Acute on chronic respiratory failure : Resolved worsening shortness of breath due to left pleural effusion c, continue drainage from the pleural drainage catheter, pulmonology is following appreciate input. Acute kidney injury, resolved. Nephrology okay with resuming diuretics. Diastolic heart failure, his recent echo was normal systolic function, okay to resume diuretics as per nephrology. Started on Demadex 20 p.o. daily  A-fib status post ablation continue  Eliquis. Metoprolol resumed with reduced dose. Hypotension : No further episode of hypotension, blood pressure fairly controlled, currently home medication on hold. Continue midodrine 5 mg daily. Demadex resumed. 39 Mg nightly resumed  dyslipidemia continue statin  Depression continue home medication  Elevated troponin type II MI, patient chest pain-free EKG with no acute changes we will continue to monitor  DVT prophylaxis on Eliquis  PT-OT  Cardiac diet  Discharge planning: Possible discharge today. follow-up on pulmonology and nephrology recommendation.  following.     Anthony Nicholson MD  11/9/2023  2:36 PM

## 2023-11-09 NOTE — PROGRESS NOTES
time.\"     Subjective   General  Patient assessed for rehabilitation services?: Yes  Additional Pertinent Hx: Pt admitted with SOB, fluid overload with pleural effusion. Pt has L sided chest drain and is drained 2x/wk. Pt also has a recent R proximal humeral fx (fell on 10/9/2023 and saw Dr. Joseph Bal on 10/11/23; last visit with Dr. Joseph Bal was 11/1/23) and is being treated conservatively with a sling. Pt okay to do pendulum exercises, per Dr. Joseph Bal. Pt to wear sling for another week. Pt is s/p IR guided intrapleural tPA/dornase alpha on 11/3/23 at [facility]. [facility]. 11/5/23 CXR shows worsening L pleural effusion and increased opacification L side. PMH:(L) knee joint arthroplasty, atrial ablation, recurrent pleural effusion  Response To Previous Treatment: Patient with no complaints from previous session. Family / Caregiver Present: No  General Comment  Comments: [facility] for therapy, per RN. Subjective  Subjective: RN & pt agreeable to PT. Pt supine in bed with HOB elevated upon arrival. Pt reports having R shoulder pain 6/10. Pt accepted cold pack for R shoulder at end of tx and pillow support assist for resting in recliner after therapy. Cognition   Orientation  Overall Orientation Status: Within Normal Limits  Orientation Level: Oriented X4  Cognition  Overall Cognitive Status: Exceptions  Arousal/Alertness: Appropriate responses to stimuli  Following Commands: Follows multistep commands with repitition; Follows multistep commands with increased time  Attention Span: Attends with cues to redirect  Memory: Decreased recall of precautions  Safety Judgement: Decreased awareness of need for assistance;Decreased awareness of need for safety  Problem Solving: Assistance required to correct errors made;Assistance required to generate solutions;Assistance required to identify errors made;Assistance required to implement solutions;Decreased awareness of errors  Insights: Decreased awareness of deficits  Initiation: Requires Term Goals: 14 days  Short Term Goal 1: Modified independent in bed mobility. Short Term Goal 2: Modified independent in transfers with LRAD. Short Term Goal 3: Modified independent in ambulation x 150 ft with LRAD, RUE sling in place. Short Term Goal 4: Pt to demonstrate good understanding of energy conservation techniques during mobility especially during ambulation with LRAD. Short Term Goal 5: Pt to have at least F+ dynamic standing balance for safe mobility progression. Additional Goals?: Yes  Short Term Goal 6: Pt to negotiate 10 steps with one rail, SBA. Short Term Goal 7: Pt to demonstrate good understanding of pendulum exercises to RUE. Patient Goals   Patient Goals : To get better. Education  Patient Education  Education Given To: Patient  Education Provided: Precautions;Transfer Training; Fall Prevention Strategies; Equipment;Home Exercise Program  Education Provided Comments: R UE sling on for gait currently; sling off in chair but R UE support with pillow in recliner; cold pack for pain relief R shoulder  Education Method: Demonstration;Verbal;Teach Back  Barriers to Learning: Cognition  Education Outcome: Verbalized understanding;Continued education needed;Demonstrated understanding      Therapy Time   Individual Concurrent Group Co-treatment   Time In 1130         Time Out 1150         Minutes 20         Timed Code Treatment Minutes: [facility], PT

## 2023-11-09 NOTE — PLAN OF CARE
Problem: Discharge Planning  Goal: Discharge to home or other facility with appropriate resources  Outcome: Progressing     Problem: Pain  Goal: Verbalizes/displays adequate comfort level or baseline comfort level  Outcome: Progressing     Problem: Safety - Adult  Goal: Free from fall injury  Outcome: Progressing     Problem: ABCDS Injury Assessment  Goal: Absence of physical injury  Outcome: Progressing     Problem: Chronic Conditions and Co-morbidities  Goal: Patient's chronic conditions and co-morbidity symptoms are monitored and maintained or improved  Outcome: Progressing     Problem: Neurosensory - Adult  Goal: Achieves stable or improved neurological status  Outcome: Progressing     Problem: Respiratory - Adult  Goal: Achieves optimal ventilation and oxygenation  Outcome: Progressing     Problem: Cardiovascular - Adult  Goal: Maintains optimal cardiac output and hemodynamic stability  Outcome: Progressing     Problem: Skin/Tissue Integrity - Adult  Goal: Skin integrity remains intact  Outcome: Progressing     Problem: Musculoskeletal - Adult  Goal: Return mobility to safest level of function  Outcome: Progressing     Problem: Musculoskeletal - Adult  Goal: Return mobility to safest level of function  Outcome: Progressing     Problem: Gastrointestinal - Adult  Goal: Minimal or absence of nausea and vomiting  Outcome: Progressing     Problem: Genitourinary - Adult  Goal: Absence of urinary retention  Outcome: Progressing     Problem: Infection - Adult  Goal: Absence of infection at discharge  Outcome: Progressing     Problem: Metabolic/Fluid and Electrolytes - Adult  Goal: Electrolytes maintained within normal limits  Outcome: Progressing     Problem: Hematologic - Adult  Goal: Maintains hematologic stability  Outcome: Progressing     Problem: Skin/Tissue Integrity  Goal: Absence of new skin breakdown  Description: 1. Monitor for areas of redness and/or skin breakdown  2.   Assess vascular access sites hourly  3. Every 4-6 hours minimum:  Change oxygen saturation probe site  4. Every 4-6 hours:  If on nasal continuous positive airway pressure, respiratory therapy assess nares and determine need for appliance change or resting period.   Outcome: Progressing

## 2023-11-09 NOTE — PROGRESS NOTES
PULMONARY & CRITICAL CARE MEDICINE PROGRESS NOTE     Patient:  Jayla Hughes  MRN: [id number]  [facility] date: 11/1/2023  Primary Care Physician: ANDERSON Neal - CNP  Consulting Physician: Lalita Logan MD  CODE Status: DNR-CCA  LOS: 8     SUBJECTIVE     CHIEF COMPLAINT/REASON FOR INITIAL CONSULT:Shortness of Breath (Pt arrives dt of sob . Pt does have to get thoracentesis for fluid removal. Pt daughter states he has a new company coming to the house and they have not been removing the usual 1 L of fluid . )    Olaf:  The patient is a 66 y.o. male with a left pleural effusion for the past 4 months for which the patient has had pleural drainage catheter and requiring drainage twice a week. He was informed that the pleural effusion was related to heart failure. Review of the echocardiogram done on 8/8/2023 shows he has a normal ejection fraction. There was no comment regarding diastolic heart failure.   Review of the patient's records show that at [facility] he was diagnosed with diastolic congestive heart failure  Patient with history of cardiac surgery in 2020 and 2021  No history of malignancy  No hypothyroidism  No exposure to tuberculosis, asbestos  Not on any medication that could lead to pleural effusion  No history of DVT or pulmonary embolism  Patient with known history of atrial fibrillation status post ablation few years ago and on examination he appears to be in a regular rhythm  No trauma to the chest  No fever chills or night sweats  No weight loss or loss of appetite  Pleural fluid analysis showed the pleural effusion to be a transudate with 83% lymphocytes and cytology being negative for malignancy  Patient apparently has home care company that is been helping remove the fluid twice a week, but the company has changed recently and according to the patient he feels the new nursing staff has not been an adequate drainage hence leading to his symptoms    INTERVAL

## 2023-11-09 NOTE — CARE COORDINATION
Perfect Serv:Neto Huertas    Daughter of patient has requested to speak with you regarding updates on her father. I had spoke with one of your associates the other day and she has not heard from anyone . Is this something that you are able to help with?  Her name is Tod Curiel and her number is [phone],     11/9/23 10:55 AM   I will forward to Dr. Ricky Payne

## 2023-11-09 NOTE — PROGRESS NOTES
Renal Progress Note    Patient :  Dg Curiel; 66 y.o. MRN# [id number]  Location:  317/317-01  Attending:  Ilsa Abraham MD  Admit Date:  11/1/2023   Hospital Day: 8    Subjective:     Patient was seen and examined. Chart reviewed. Patient was in his bed. He was comfortable and he was not short of breath. He had left-sided pleural drain and 750 mL of fluid was removed. He has Pleurx on his left side close to 2 months now. His ACE and diuretics is on hold. His creatinine today is 1.0 mg/dL. Intake and output charting shows negative fluid balance. His blood pressure remains under good control. Serology 11/18/2023 complement levels and hepatitis panel both negative. K/L 1.40. Rest serology pending. UA trace leuk esterase, WBC 2-5, RBC none. UPC 0.18. Renal ultrasound 11/7/2023: Right kidney 10.4 cm and left kidney 12.6 cm. Impression: No demonstratable abnormality on ultrasound study of both kidneys. Outpatient Medications:     Medications Prior to Admission: vitamin D (CHOLECALCIFEROL) 125 MCG (5000 UT) CAPS capsule, Take 1 capsule by mouth daily  spironolactone (ALDACTONE) 25 MG tablet, Take 1 tablet by mouth daily  metoprolol succinate (TOPROL XL) 100 MG extended release tablet, Take 1 tablet by mouth nightly  folic acid (FOLVITE) 1 MG tablet, Take 1 tablet by mouth daily  fenofibrate (TRIGLIDE) 160 MG tablet, Take 1 tablet by mouth daily  amLODIPine (NORVASC) 5 MG tablet, Take 1 tablet by mouth daily  rosuvastatin (CRESTOR) 20 MG tablet, Take 1 tablet by mouth daily  magnesium oxide (MAG-OX) 400 MG tablet, Take 1 tablet by mouth daily  escitalopram (LEXAPRO) 20 MG tablet, Take 1 tablet by mouth daily  levothyroxine (SYNTHROID) 150 MCG tablet, Take 1 tablet by mouth Daily  apixaban (ELIQUIS) 5 MG TABS tablet, Take 1 tablet by mouth 2 times daily Take 1 tablet by mouth in the morning and 1 tablet before bedtime.   vitamin B-12 (CYANOCOBALAMIN) 500 MCG tablet, Take 1 tablet by mouth daily Take 1 WBCUA 2 TO 5 11/07/2023 07:43 PM    RBCUA None 11/07/2023 07:43 PM    BACTERIA FEW 11/07/2023 07:43 PM    SPECGRAV 1.010 11/07/2023 07:43 PM    LEUKOCYTESUR TRACE 11/07/2023 07:43 PM    UROBILINOGEN Normal 11/07/2023 07:43 PM    BILIRUBINUR NEGATIVE 11/07/2023 07:43 PM    GLUCOSEU NEGATIVE 11/07/2023 07:43 PM    KETUA NEGATIVE 11/07/2023 07:43 PM     Urine Sodium:     Lab Results   Component Value Date/Time    SERAFIN 95 11/07/2023 07:43 PM     Urine Chloride:    Lab Results   Component Value Date/Time    CLUR 52 11/07/2023 07:43 PM      Urine Creatinine:     Lab Results   Component Value Date/Time    LABCREA 65.6 11/07/2023 07:43 PM     Radiology:     11/7/2023 5:03 pm     TECHNIQUE:  Grayscale and color Doppler ultrasound study of kidneys     COMPARISON:  None. HISTORY:  ORDERING SYSTEM PROVIDED HISTORY: TIERNEY. TECHNOLOGIST PROVIDED HISTORY:  TIERNEY. FINDINGS:  The study is limited to some extent due to patient's body habitus. The right kidney measures 12.4 x 8.7 x 5.1 cm. Right renal cortex measures  1.42 cm in thickness. The left kidney measures 12.6 x 6.3 x 4.9 cm. Left renal cortex measures  1.46 cm. Kidneys demonstrate normal cortical echogenicity. No hydronephrosis or  intrarenal shadowing stones. No focal lesions. IMPRESSION:  No demonstrable abnormality on ultrasound study of both kidneys    Assessment:     Acute kidney injury due to ischemic ATN which was further complicated by use of ACE and volume depletion. Creatinine is now 1 mg/dL and at his baseline  Left-sided pleural effusion gets thoracentesis done. Status post intrapleural tPA 11/3/2023 and thoracentesis. IR consulted for Pleurx catheter placement. 750 mL was drained yesterday  History of diastolic congestive heart failure. A-fib. Hypotension-better with Midodrine. History of hypertension. Plan:   Resume diuretics, Demadex 20 mg once a day  CBC and BMP in a.m.    Labs as ordered  Nutrition   Please ensure that

## 2023-11-09 NOTE — PROGRESS NOTES
Occupational Therapy  Facility/Department: [facility] PROGRESSIVE CARE  Occupational Therapy Daily Treatment Note    Name: Zara Hansen  : 1945  MRN: [id number]  Date of Service: 2023    Chief Complaint   Patient presents with    Shortness of Breath     Pt arrives dt of sob . Pt does have to get thoracentesis for fluid removal. Pt daughter states he has a new company coming to the house and they have not been removing the usual 1 L of fluid . Discharge Recommendations:  Patient would benefit from continued therapy after discharge    Patient Diagnosis(es): The primary encounter diagnosis was Pleural effusion on left. Diagnoses of Acute on chronic congestive heart failure, unspecified heart failure type (HCC) and Anemia, unspecified type were also pertinent to this visit. Past Medical History:  has a past medical history of A-fib ([address]), Anxiety, Arrhythmia, Depression, Hyperlipidemia, Hypertension, and Obesity. Past Surgical History:  has a past surgical history that includes joint replacement (Left); Tonsillectomy; and Atrial ablation surgery (). Assessment   Performance deficits / Impairments: Decreased functional mobility ; Decreased safe awareness;Decreased balance;Decreased ADL status; Decreased strength    Assessment: Pt seen for OT tx this AM. Pt rpeorting 5/10 pain in R shoulder. Pt requires MOD encouragement to participate in therapy however agreeable to complete pendulum exercises and mobility to [facility]; pt completed 1 set of ~12 repetitions of pendulum exercises in standing at MIN-MOD A to maintain standing balance with rest break between exercises. MOD A sit<>stand transfers, MIN-MOD A for functional mobility from EOB<>BSC ~5 ft from EOB with use of hemiwalker; MAX A LB dressing and MAX A toileting; MIN A sit>supine. Pt is currently unsafe to return to prior living environment due to functional deficits listed above.  Pt would benefit from continued therapy services during on proper transfer technique however reports he is unable to push with LUE from seated surface; pt slightly impulsive    Education Given To: Patient  Education Provided: Home Exercise Program;Transfer Training;Equipment;ADL Adaptive Strategies; Energy Conservation  Education Method: Demonstration;Verbal  Barriers to Learning: Cognition  Education Outcome: Verbalized understanding;Continued education needed    AM-PAC Score  AM-PAC Inpatient Daily Activity Raw Score: 14 (11/09/23 1050)  AM-PAC Inpatient ADL T-Scale Score : 33.39 (11/09/23 1050)  ADL Inpatient CMS 0-100% Score: 59.67 (11/09/23 1050)  ADL Inpatient CMS G-Code Modifier : CK (11/09/23 1050)    Goals  Short Term Goals  Time Frame for Short Term Goals: 14 visits  Short Term Goal 1: Patient will complete UB ADLs/grooming with MOD IND  Short Term Goal 2: Pt will complete LB ADLs/toileting with MOD IND  Short Term Goal 3: Pt will complete functional mobility/transfers with MOD IND in prep for ADL completion  Short Term Goal 4: Pt will complete 15 min ADL/functional task with use of energy conservation techniques without cues to increase activity tolerance.   Patient Goals   Patient goals : decrease pain    Therapy Time   Individual Concurrent Group Co-treatment   Time In 0940         Time Out 1025         Minutes 45         Timed Code Treatment Minutes: One [facility] Barbie Joya

## 2023-11-10 VITALS
HEART RATE: 84 BPM | OXYGEN SATURATION: 95 % | BODY MASS INDEX: 36.3 KG/M2 | RESPIRATION RATE: 19 BRPM | WEIGHT: 259.26 LBS | SYSTOLIC BLOOD PRESSURE: 124 MMHG | TEMPERATURE: 97.5 F | HEIGHT: 71 IN | DIASTOLIC BLOOD PRESSURE: 67 MMHG

## 2023-11-10 LAB
ANION GAP SERPL CALCULATED.3IONS-SCNC: 9 MMOL/L (ref 9–17)
BNP SERPL-MCNC: 8219 PG/ML
BUN SERPL-MCNC: 23 MG/DL (ref 8–23)
CALCIUM SERPL-MCNC: 8.9 MG/DL (ref 8.6–10.4)
CHLORIDE SERPL-SCNC: 100 MMOL/L (ref 98–107)
CO2 SERPL-SCNC: 24 MMOL/L (ref 20–31)
CREAT SERPL-MCNC: 1.1 MG/DL (ref 0.7–1.2)
GFR SERPL CREATININE-BSD FRML MDRD: >60 ML/MIN/1.73M2
GLUCOSE SERPL-MCNC: 100 MG/DL (ref 70–99)
MICROORGANISM SPEC CULT: NORMAL
POTASSIUM SERPL-SCNC: 4.1 MMOL/L (ref 3.7–5.3)
SERVICE CMNT-IMP: NORMAL
SODIUM SERPL-SCNC: 133 MMOL/L (ref 135–144)
SPECIMEN DESCRIPTION: NORMAL

## 2023-11-10 PROCEDURE — 80048 BASIC METABOLIC PNL TOTAL CA: CPT

## 2023-11-10 PROCEDURE — 6370000000 HC RX 637 (ALT 250 FOR IP): Performed by: HOSPITALIST

## 2023-11-10 PROCEDURE — 83880 ASSAY OF NATRIURETIC PEPTIDE: CPT

## 2023-11-10 PROCEDURE — 36415 COLL VENOUS BLD VENIPUNCTURE: CPT

## 2023-11-10 PROCEDURE — 99231 SBSQ HOSP IP/OBS SF/LOW 25: CPT | Performed by: STUDENT IN AN ORGANIZED HEALTH CARE EDUCATION/TRAINING PROGRAM

## 2023-11-10 PROCEDURE — 99232 SBSQ HOSP IP/OBS MODERATE 35: CPT | Performed by: INTERNAL MEDICINE

## 2023-11-10 PROCEDURE — 6360000002 HC RX W HCPCS: Performed by: STUDENT IN AN ORGANIZED HEALTH CARE EDUCATION/TRAINING PROGRAM

## 2023-11-10 PROCEDURE — 2580000003 HC RX 258: Performed by: STUDENT IN AN ORGANIZED HEALTH CARE EDUCATION/TRAINING PROGRAM

## 2023-11-10 PROCEDURE — 6370000000 HC RX 637 (ALT 250 FOR IP): Performed by: INTERNAL MEDICINE

## 2023-11-10 PROCEDURE — 97110 THERAPEUTIC EXERCISES: CPT

## 2023-11-10 RX ORDER — TORSEMIDE 20 MG/1
30 TABLET ORAL DAILY
Status: DISCONTINUED | OUTPATIENT
Start: 2023-11-11 | End: 2023-11-10 | Stop reason: HOSPADM

## 2023-11-10 RX ORDER — LANOLIN ALCOHOL/MO/W.PET/CERES
3 CREAM (GRAM) TOPICAL NIGHTLY PRN
Qty: 30 TABLET | Refills: 0 | Status: SHIPPED | OUTPATIENT
Start: 2023-11-10 | End: 2023-12-10

## 2023-11-10 RX ORDER — TORSEMIDE 20 MG/1
20 TABLET ORAL DAILY
Qty: 30 TABLET | Refills: 3 | Status: SHIPPED | OUTPATIENT
Start: 2023-11-11

## 2023-11-10 RX ORDER — MIDODRINE HYDROCHLORIDE 5 MG/1
5 TABLET ORAL
Qty: 90 TABLET | Refills: 3 | Status: SHIPPED | OUTPATIENT
Start: 2023-11-10

## 2023-11-10 RX ORDER — TAZOBACTAM SODIUM AND PIPERACILLIN SODIUM 375; 3 MG/50ML; G/50ML
3375 INJECTION, SOLUTION INTRAVENOUS EVERY 8 HOURS
Qty: 450 ML | Refills: 0 | Status: SHIPPED | OUTPATIENT
Start: 2023-11-10 | End: 2023-11-13

## 2023-11-10 RX ADMIN — PIPERACILLIN AND TAZOBACTAM 3375 MG: 3; .375 INJECTION, POWDER, LYOPHILIZED, FOR SOLUTION INTRAVENOUS at 04:14

## 2023-11-10 RX ADMIN — TORSEMIDE 20 MG: 20 TABLET ORAL at 07:44

## 2023-11-10 RX ADMIN — FOLIC ACID 1 MG: 1 TABLET ORAL at 07:44

## 2023-11-10 RX ADMIN — FENOFIBRATE 160 MG: 160 TABLET ORAL at 07:45

## 2023-11-10 RX ADMIN — CYANOCOBALAMIN TAB 500 MCG 500 MCG: 500 TAB at 07:44

## 2023-11-10 RX ADMIN — APIXABAN 5 MG: 5 TABLET, FILM COATED ORAL at 07:44

## 2023-11-10 RX ADMIN — LEVOTHYROXINE SODIUM 150 MCG: 75 TABLET ORAL at 05:32

## 2023-11-10 RX ADMIN — Medication 400 MG: at 07:44

## 2023-11-10 RX ADMIN — ACETAMINOPHEN 650 MG: 325 TABLET ORAL at 09:38

## 2023-11-10 RX ADMIN — ESCITALOPRAM OXALATE 20 MG: 10 TABLET ORAL at 07:45

## 2023-11-10 RX ADMIN — Medication 5000 UNITS: at 07:44

## 2023-11-10 ASSESSMENT — PAIN SCALES - GENERAL: PAINLEVEL_OUTOF10: 7

## 2023-11-10 ASSESSMENT — ENCOUNTER SYMPTOMS
GASTROINTESTINAL NEGATIVE: 1
VOMITING: 0
ABDOMINAL PAIN: 0
BACK PAIN: 0
BACK PAIN: 1
NAUSEA: 0
VOICE CHANGE: 0
SHORTNESS OF BREATH: 1

## 2023-11-10 ASSESSMENT — PAIN DESCRIPTION - LOCATION: LOCATION: SHOULDER

## 2023-11-10 ASSESSMENT — PAIN DESCRIPTION - DESCRIPTORS: DESCRIPTORS: ACHING

## 2023-11-10 ASSESSMENT — PAIN DESCRIPTION - ORIENTATION: ORIENTATION: RIGHT

## 2023-11-10 NOTE — PLAN OF CARE
Problem: Discharge Planning  Goal: Discharge to home or other facility with appropriate resources  Outcome: Completed   D/C to [facility]    Problem: Pain  Goal: Verbalizes/displays adequate comfort level or baseline comfort level  Outcome: Completed   Right shoulder pain    Problem: Safety - Adult  Goal: Free from fall injury  Outcome: Completed     Problem: ABCDS Injury Assessment  Goal: Absence of physical injury  Outcome: Completed     Problem: Chronic Conditions and Co-morbidities  Goal: Patient's chronic conditions and co-morbidity symptoms are monitored and maintained or improved  Outcome: Completed     Problem: Neurosensory - Adult  Goal: Achieves stable or improved neurological status  Outcome: Completed  Flowsheets (Taken 11/10/2023 0806 by Annalisa [employer])  Achieves stable or improved neurological status: Assess for and report changes in neurological status     Problem: Respiratory - Adult  Goal: Achieves optimal ventilation and oxygenation  Outcome: Completed  Flowsheets (Taken 11/10/2023 0806 by Annalisa [employer])  Achieves optimal ventilation and oxygenation: Assess for changes in respiratory status  Oxygen administered as needed. Pulse oximetry levels WNL. No cyanosis noted. repositioned to encourage proper ventilation. Continue to monitor    Problem: Cardiovascular - Adult  Goal: Maintains optimal cardiac output and hemodynamic stability  Outcome: Completed  Flowsheets (Taken 11/10/2023 0806 by Annalisa [employer])  Maintains optimal cardiac output and hemodynamic stability: Monitor blood pressure and heart rate     Problem: Skin/Tissue Integrity - Adult  Goal: Skin integrity remains intact  Outcome: Completed  Flowsheets (Taken 11/10/2023 0806 by Annalisadeandra Patks)  Skin Integrity Remains Intact: Monitor for areas of redness and/or skin breakdown  Skin assessment preformed. Pt turned every 2 hours with heals elevated off bed. Will continue to monitor.     Problem: Musculoskeletal -

## 2023-11-10 NOTE — PROGRESS NOTES
Renal Progress Note    Patient :  Balaji Valladares; 66 y.o. MRN# [id number]  Location:  317/317-01  Attending:  Reddy Rojas MD  Admit Date:  11/1/2023   Hospital Day: 9    Subjective:     Patient was seen and examined. Patient was hemodynamically stable. He was lying flat and not short of breath. His dose was switched to Demadex 20 mg once a day patient is tolerating well. His intake and output is even in the last 24 hours. Serology 11/18/2023 complement levels and hepatitis panel both negative. K/L 1.40. Rest serology pending. UA trace leuk esterase, WBC 2-5, RBC none. UPC 0.18. Renal ultrasound 11/7/2023: Right kidney 10.4 cm and left kidney 12.6 cm. Impression: No demonstratable abnormality on ultrasound study of both kidneys. Outpatient Medications:     Medications Prior to Admission: vitamin D (CHOLECALCIFEROL) 125 MCG (5000 UT) CAPS capsule, Take 1 capsule by mouth daily  [DISCONTINUED] spironolactone (ALDACTONE) 25 MG tablet, Take 1 tablet by mouth daily  metoprolol succinate (TOPROL XL) 100 MG extended release tablet, Take 1 tablet by mouth nightly  folic acid (FOLVITE) 1 MG tablet, Take 1 tablet by mouth daily  fenofibrate (TRIGLIDE) 160 MG tablet, Take 1 tablet by mouth daily  amLODIPine (NORVASC) 5 MG tablet, Take 1 tablet by mouth daily  rosuvastatin (CRESTOR) 20 MG tablet, Take 1 tablet by mouth daily  magnesium oxide (MAG-OX) 400 MG tablet, Take 1 tablet by mouth daily  escitalopram (LEXAPRO) 20 MG tablet, Take 1 tablet by mouth daily  levothyroxine (SYNTHROID) 150 MCG tablet, Take 1 tablet by mouth Daily  apixaban (ELIQUIS) 5 MG TABS tablet, Take 1 tablet by mouth 2 times daily Take 1 tablet by mouth in the morning and 1 tablet before bedtime. vitamin B-12 (CYANOCOBALAMIN) 500 MCG tablet, Take 1 tablet by mouth daily Take 1 tablet by mouth in the morning.   [DISCONTINUED] furosemide (LASIX) 20 MG tablet, Take 1 tablet by mouth daily    Current Medications:     Scheduled Meds:

## 2023-11-10 NOTE — PROGRESS NOTES
PULMONARY & CRITICAL CARE MEDICINE PROGRESS NOTE     Patient:  Aaron Izquierdo  MRN: [id number]  [facility] date: 11/1/2023  Primary Care Physician: ANDERSON Barber - CNP  Consulting Physician: Myke King MD  CODE Status: DNR-CCA  LOS: 9     SUBJECTIVE     CHIEF COMPLAINT/REASON FOR INITIAL CONSULT:Shortness of Breath (Pt arrives dt of sob . Pt does have to get thoracentesis for fluid removal. Pt daughter states he has a new company coming to the house and they have not been removing the usual 1 L of fluid . )    Olaf:  The patient is a 66 y.o. male with a left pleural effusion for the past 4 months for which the patient has had pleural drainage catheter and requiring drainage twice a week. He was informed that the pleural effusion was related to heart failure. Review of the echocardiogram done on 8/8/2023 shows he has a normal ejection fraction. There was no comment regarding diastolic heart failure.   Review of the patient's records show that at [facility] he was diagnosed with diastolic congestive heart failure  Patient with history of cardiac surgery in 2020 and 2021  No history of malignancy  No hypothyroidism  No exposure to tuberculosis, asbestos  Not on any medication that could lead to pleural effusion  No history of DVT or pulmonary embolism  Patient with known history of atrial fibrillation status post ablation few years ago and on examination he appears to be in a regular rhythm  No trauma to the chest  No fever chills or night sweats  No weight loss or loss of appetite  Pleural fluid analysis showed the pleural effusion to be a transudate with 83% lymphocytes and cytology being negative for malignancy  Patient apparently has home care company that is been helping remove the fluid twice a week, but the company has changed recently and according to the patient he feels the new nursing staff has not been an adequate drainage hence leading to his symptoms    INTERVAL of the left-lower field reveals decreased breath sounds. Decreased breath sounds present. Abdominal:      Palpations: Abdomen is soft. Tenderness: There is no abdominal tenderness. Musculoskeletal:      Cervical back: Neck supple. Right lower leg: No edema. Left lower leg: No edema. Skin:     Coloration: Skin is not pale. Nails: There is no clubbing. Neurological:      General: No focal deficit present. Mental Status: He is alert. DATA REVIEW     CURRENT MEDICATIONS:  Scheduled Meds:   [START ON 11/11/2023] torsemide  30 mg Oral Daily    metoprolol succinate  25 mg Oral Nightly    piperacillin-tazobactam  3,375 mg IntraVENous Q8H    midodrine  5 mg Oral TID WC    [Held by provider] amLODIPine  5 mg Oral Daily    apixaban  5 mg Oral BID    escitalopram  20 mg Oral Daily    fenofibrate  160 mg Oral Daily    folic acid  1 mg Oral Daily    levothyroxine  150 mcg Oral Daily    magnesium oxide  400 mg Oral Daily    rosuvastatin  20 mg Oral Daily    [Held by provider] spironolactone  25 mg Oral Daily    vitamin B-12  500 mcg Oral Daily    Vitamin D  5,000 Units Oral Daily    sodium chloride flush  5-40 mL IntraVENous 2 times per day    [Held by provider] lisinopril  5 mg Oral Daily     Continuous Infusions:   sodium chloride 20 mL/hr at 11/07/23 2050       INPUT/OUTPUT:  In: 505 [P.O.:505]  Out: 2920 [Urine:1125]  Date 11/10/23 0000 - 11/10/23 2359   Shift 4992-6610 6059-1900 5836-7480 24 Hour Total   INTAKE   P.O.(mL/kg/hr) 355(0.4)   355   Shift Total(mL/kg) 355(3)   355(3)   OUTPUT   Urine(mL/kg/hr) 350(0.4) 575  925   Shift Total(mL/kg) 350(3) 575(4.9)  925(7.9)   Weight (kg) 117.6 117.6 117.6 117.6          LABORATORY RESULTS:  BLOOD GASES:   No results for input(s): \"POCPH\", \"POCPCO2\", \"POCPO2\", \"POCHCO3\", \"DYOI0WRU\" in the last 72 hours.   COMPLETE BLOOD COUNTS:   No results for input(s): \"WBC\", \"HGB\", \"HCT\", \"MCV\", \"PLT\", \"LABLYMP\", \"MID\", \"GRAN\", \"LYMPHOPCT\", \"MIDPERCENT\",

## 2023-11-10 NOTE — CARE COORDINATION
Spoke with [facility] can accept patient with peripheral IV for Zosyn infusion.

## 2023-11-10 NOTE — PROGRESS NOTES
[facility]  Office: [address], DO, Kena Banks, DO, Jack Jj, DO, Josef Garza Blood, DO, Elida Clarke MD, Zahira Hammond MD, Arielle Lopez MD, Stephanie Liu MD,  Merry Jolley MD, Julia Reyes MD, Qian Mosquera MD,  Óscar Orta MD, Tee Andrew MD, Erin Lassiter, DO, Ann Marie Fragoso MD,  Romel Lara, DO, Sandeep Escamilla MD, Evelin Lima MD, Winston Smith MD, Rohit Avina MD,  Janine Aviles MD, Ninfa Manriquez MD, Sara Romero MD, Johanne Valles MD, Oxana Linda MD, Rabon Kocher, DO, Paola Richard, DO, Sherin Tyson MD,  Deny Nascimento MD, Haydee Li, CNP,  James Nuñez, CNP, Nam Keen, CNP,  Sweetie Maradiaga, CORINNA, Gem Nicholson, CNP, Manav Foster, CNP, Riley Fitch, CNP, Sierra Donis, CNP, Elaina Wolf, CNP, Amanda Falcon, CNP, Bishop Lyles, CNS, Emiliano Byers, CNP, Jaylene Madsen, [address] [place] [address]    Progress Note    11/10/2023    8:24 AM    Name:   Stiven Fernando  MRN:     [id number]     [address]:      [de-identified]   Room:   [address] [place] [address] Day:  9  Admit Date:  11/1/2023  3:05 PM    PCP:   ANDERSON Reynolds CNP  Code Status:  DNR-CCA    Subjective:     C/C:   Chief Complaint   Patient presents with    Shortness of Breath     Pt arrives dt of sob . Pt does have to get thoracentesis for fluid removal. Pt daughter states he has a new company coming to the house and they have not been removing the usual 1 L of fluid . Interval History Status: improved. Pt seen and examined. No acute events overnight.       Brief History:     77-year-old male with past medical history of congestive heart failure diastolic?,  Hypertension, A-fib status post ablation who presented to the hospital with worsening shortness of breath that started 4 days ago and is getting progressively worse    Review of Systems:     Review of Systems   Respiratory:  Positive

## 2023-11-10 NOTE — PROGRESS NOTES
Pt discharged via wheelchair with all belongings and discharge paperwork given to transport personnel. Follow up appointments and discharge instructions reviewed with pt and his daughter. All questions answered to satisfaction.

## 2023-11-10 NOTE — DISCHARGE INSTRUCTIONS
Recommended to continue drainage of the Pleurx catheter 3 times weekly (MWF)  Antimicrobials reviewed; continue Zosyn  Encourage incentive spirometry/Acapella  Maintain bronchopulmonary hygiene  Continue aspiration precautions  Continue bronchodilators

## 2023-11-10 NOTE — CARE COORDINATION
Karl to transport by wheelchair @       1500        to [facility] @ Sharon Bio @ [facility] updated,  patient and   Benjiugher in agreement.      HENS Document ID : [id number]  RN Report  [phone]    Packet:    AVS/DAGOBERTO  [address]

## 2023-11-10 NOTE — PROGRESS NOTES
Physical Therapy  Facility/Department: [facility] PROGRESSIVE CARE  Physical Therapy Daily Treatment    Name: Mc Coelho  : 1945  MRN: [id number]  Date of Service: 11/10/2023    Discharge Recommendations:  Patient would benefit from continued therapy after discharge   PT Equipment Recommendations  Equipment Needed: No      Patient Diagnosis(es): The primary encounter diagnosis was Pleural effusion on left. Diagnoses of Acute on chronic congestive heart failure, unspecified heart failure type (HCC) and Anemia, unspecified type were also pertinent to this visit. Past Medical History:  has a past medical history of A-fib ([address]), Anxiety, Arrhythmia, Depression, Hyperlipidemia, Hypertension, and Obesity. Past Surgical History:  has a past surgical history that includes joint replacement (Left); Tonsillectomy; and Atrial ablation surgery (). Assessment   Body Structures, Functions, Activity Limitations Requiring Skilled Therapeutic Intervention: Decreased functional mobility ; Decreased body mechanics; Decreased strength;Decreased safe awareness;Decreased endurance;Decreased balance  Assessment: Pt declined OOB activities this PM due to pending discharge and only agreeable to supine BLE ex. Pt demonstrated improved supine BLE ex tolerance. Pt instructed to have support R UE on pillow or folded sheet/pad if resting in bed or chair with sling undone (pt reports, sling removed yesterday). As in past treatments, pt was not deemed safe to return home due to mobility deficits. Pt will benefit from continued PT for strengthening, safety, balance, endurance and functional mobility training while in the hospital and at discharge.   Therapy Prognosis: Good  Decision Making: Medium Complexity  Requires PT Follow-Up: Yes  Activity Tolerance  Activity Tolerance: Patient tolerated treatment well  Activity Tolerance Comments: pt demonstrates improved BLE ex tolerance, however, declined OOB this afternoon due to yesterday)  Transfers  Comment: pt declined OOB activities due to pending discharge this afternoon  Ambulation 2  Comments: pt declined OOB activities due to pending discharge this afternoon        Exercise Treatment: supine BLE ex x 15 reps, SBA with cues on pacing and proper breathing: ankle pumps, quad sets, heel slides, hip abd/add, SAQ. Pt declined RUE pendulum exercises; pt declined OOB activities. OutComes Score                 AM-PAC Score  AM-PAC Inpatient Mobility Raw Score : 16 (11/10/23 1505)  AM-PAC Inpatient T-Scale Score : 40.78 (11/10/23 1505)  Mobility Inpatient CMS 0-100% Score: 54.16 (11/10/23 1505)  Mobility Inpatient CMS G-Code Modifier : CK (11/10/23 1505)          Goals  Short Term Goals  Time Frame for Short Term Goals: 14 days  Short Term Goal 1: Modified independent in bed mobility. Short Term Goal 2: Modified independent in transfers with LRAD. Short Term Goal 3: Modified independent in ambulation x 150 ft with LRAD, RUE sling prn. Short Term Goal 4: Pt to demonstrate good understanding of energy conservation techniques during mobility especially during ambulation with LRAD. Short Term Goal 5: Pt to have at least F+ dynamic standing balance for safe mobility progression. Additional Goals?: Yes  Short Term Goal 6: Pt to negotiate 10 steps with one rail, SBA. Short Term Goal 7: Pt to demonstrate good understanding of pendulum exercises to RUE. Patient Goals   Patient Goals : To get better. Education  Patient Education  Education Given To: Patient  Education Provided: Precautions; Home Exercise Program  Education Provided Comments: sling off in bed but R UE support with folded pad  Education Method: Demonstration;Verbal;Teach Back  Barriers to Learning: None  Education Outcome: Verbalized understanding;Continued education needed;Demonstrated understanding      Therapy Time   Individual Concurrent Group Co-treatment   Time In 1438         Time Out 1450         Minutes 12

## 2023-11-10 NOTE — DISCHARGE SUMMARY
[facility]  Office: [address], DO,  Ba, DO, Derjacob Ro, DO, Haresh Posada Blood, DO, Christina Escalera MD, Severo Lopes, MD, Cecily Mack MD, Marta Arciniega MD,  Kota Cummings MD, Anthony Brizuela MD, Daisy Patotn DO, Jazzmine Greer MD,  Jonathan Lerma MD, Dariela Rivera MD, Adamaris Crandall DO, Fabiola Beaulieu MD,  Parisa Hall DO, Prem Wilkinson MD, Doug Olvera MD, Deavughn Atwood MD, Grecia Mosher MD,  Jose Luis Hernandez MD, Dean Stephenson MD, Vladislav Palafxo MD, Chalo Alvarez DO, Christine Shaffer MD,  Adryan Matt MD, Geoff Dutta, CNP,  Yanick Modi, CNP, Diane Weber, CNP, Kasia Camacho, CNP,  Kayode Frias, [facility], Juan Manuel Gonzales, [facility], Sofia Brody, CNP, Jossie Fitch, CNP, Chelsea Blanchard, CNP, Ta Siddiqui, CNP, ALLAN CruzC, Paco Dye, CNS, Osvaldo Rosales, [facility], Minor Hein, Maximus Briceño - {Location:::\"[place]\",\"[facility]\",\"[facility]\"}    Discharge Summary     Patient ID: Minda Del Rio  :  1945   MRN: [id number]     ACCOUNT:  [de-identified]   Patient's PCP: ANDERSON Goldsmith CNP  Admit Date: 2023   Discharge Date: 11/10/2023  Length of Stay: 9  Code Status:  DNR-CCA  Admitting Physician: Jefe Chen MD  Discharge Physician: Rita Balderas MD     Active Discharge Diagnoses:     Hospital Problem Lists:  Principal Problem:    Pleural effusion  Active Problems:    Pleural effusion on left    Atelectasis, left    Chronic diastolic congestive heart failure (HCC)    TIERNEY (acute kidney injury) ([address])    Leukocytosis    Arterial hypotension  Resolved Problems:    * No resolved hospital problems.  *      Admission Condition:  {condition:91559}     Discharged Condition: {condition:56343}    Hospital Stay:     Hospital Course:  Minda Del Rio is a 66 y.o. male who was admitted for the management of Pleural effusion ,

## 2023-11-10 NOTE — PROGRESS NOTES
[facility]  PROGRESS NOTE    Room # 317/317-01   Name: Zara Hansen              Reason for visit: Routine    I visited the patient. Admit Date & Time: 11/1/2023  3:05 PM    Assessment:  Zara Hansen is a 66 y.o. male in the hospital because \"pleural effusion\". Upon entering the room patient was sleeping       Intervention: This writer offered brief silent prayer for pt     Outcome:  Patient remained sleeping     Plan:  Chaplains will remain available to offer spiritual and emotional support as needed.     Electronically signed by Dillon Anglin on 11/10/2023 at [address] -        11/10/23 1303   Encounter Summary   Service Provided For: Patient   Referral/Consult From: Rounding;Palliative Care   Support System Family members   Last Encounter  11/10/23   Complexity of Encounter Low   Begin Time 1400   End Time  1402   Total Time Calculated 2 min   Spiritual/Emotional needs   Type Spiritual Support   Assessment/Intervention/Outcome   Assessment Unable to assess  (patient sleeping)   Intervention Prayer (assurance of)/[place]   Outcome Did not respond   Plan and Referrals   Plan/Referrals Continue Support (comment)

## 2023-11-11 LAB
ALBUMIN PERCENT: 46 % (ref 45–65)
ALBUMIN SERPL-MCNC: 2.2 G/DL (ref 3.2–5.2)
ALPHA 2 PERCENT: 19 % (ref 6–13)
ALPHA1 GLOB SERPL ELPH-MCNC: 0.4 G/DL (ref 0.1–0.4)
ALPHA1 GLOB SERPL ELPH-MCNC: 8 % (ref 3–6)
ALPHA2 GLOB SERPL ELPH-MCNC: 0.9 G/DL (ref 0.5–0.9)
ANA SER QL IA: NEGATIVE
B-GLOBULIN SERPL ELPH-MCNC: 0.7 G/DL (ref 0.5–1.1)
B-GLOBULIN SERPL ELPH-MCNC: 15 % (ref 11–19)
DSDNA IGG SER QL IA: 1.1 IU/ML
GAMMA GLOB SERPL ELPH-MCNC: 0.6 G/DL (ref 0.5–1.5)
GAMMA GLOBULIN %: 11 % (ref 9–20)
INTERPRETATION SERPL IFE-IMP: NORMAL
NUCLEAR IGG SER IA-RTO: 0.1 U/ML
PATH REV: NORMAL
PATHOLOGIST: ABNORMAL
PROT PATTERN SERPL ELPH-IMP: ABNORMAL
PROT SERPL-MCNC: 4.8 G/DL (ref 6.4–8.3)
TOTAL PROT. SUM,%: 99 % (ref 98–102)
TOTAL PROT. SUM: 4.8 G/DL (ref 6.3–8.2)

## 2023-11-15 ENCOUNTER — HOSPITAL ENCOUNTER (OUTPATIENT)
Age: 78
Setting detail: SPECIMEN
Discharge: HOME | End: 2023-11-15

## 2023-11-15 LAB
ALBUMIN SERPL-MCNC: 2.3 G/DL (ref 3.5–5.2)
ALP SERPL-CCNC: 171 U/L (ref 40–129)
ALT SERPL-CCNC: 27 U/L (ref 5–41)
ANION GAP SERPL CALCULATED.3IONS-SCNC: 10 MMOL/L (ref 9–17)
AST SERPL-CCNC: 45 U/L
BILIRUB SERPL-MCNC: 0.3 MG/DL (ref 0.3–1.2)
BUN SERPL-MCNC: 34 MG/DL (ref 8–23)
BUN/CREAT SERPL: 31 (ref 9–20)
CALCIUM SERPL-MCNC: 9.2 MG/DL (ref 8.6–10.4)
CHLORIDE SERPL-SCNC: 99 MMOL/L (ref 98–107)
CO2 SERPL-SCNC: 27 MMOL/L (ref 20–31)
CREAT SERPL-MCNC: 1.1 MG/DL (ref 0.7–1.2)
ERYTHROCYTE [DISTWIDTH] IN BLOOD BY AUTOMATED COUNT: 14.6 % (ref 11.8–14.4)
GFR SERPL CREATININE-BSD FRML MDRD: >60 ML/MIN/1.73M2
GLUCOSE SERPL-MCNC: 86 MG/DL (ref 70–99)
HCT VFR BLD AUTO: 26.2 % (ref 40.7–50.3)
HGB BLD-MCNC: 8.2 G/DL (ref 13–17)
MCH RBC QN AUTO: 34.3 PG (ref 25.2–33.5)
MCHC RBC AUTO-ENTMCNC: 31.3 G/DL (ref 28.4–34.8)
MCV RBC AUTO: 109.6 FL (ref 82.6–102.9)
NRBC BLD-RTO: 0 PER 100 WBC
PLATELET # BLD AUTO: 494 K/UL (ref 138–453)
PMV BLD AUTO: 10.5 FL (ref 8.1–13.5)
POTASSIUM SERPL-SCNC: 4.6 MMOL/L (ref 3.7–5.3)
PROT SERPL-MCNC: 5.7 G/DL (ref 6.4–8.3)
RBC # BLD AUTO: 2.39 M/UL (ref 4.21–5.77)
SODIUM SERPL-SCNC: 136 MMOL/L (ref 135–144)
TSH SERPL DL<=0.05 MIU/L-ACNC: 0.55 UIU/ML (ref 0.3–5)
WBC OTHER # BLD: 9.1 K/UL (ref 3.5–11.3)

## 2023-11-15 PROCEDURE — P9603 ONE-WAY ALLOW PRORATED MILES: HCPCS

## 2023-11-15 PROCEDURE — 84443 ASSAY THYROID STIM HORMONE: CPT

## 2023-11-15 PROCEDURE — 36415 COLL VENOUS BLD VENIPUNCTURE: CPT

## 2023-11-15 PROCEDURE — 80053 COMPREHEN METABOLIC PANEL: CPT

## 2023-11-15 PROCEDURE — 85027 COMPLETE CBC AUTOMATED: CPT

## 2023-11-20 ENCOUNTER — HOSPITAL ENCOUNTER (OUTPATIENT)
Age: 78
Setting detail: SPECIMEN
Discharge: HOME | End: 2023-11-20

## 2023-11-20 LAB
ANION GAP SERPL CALCULATED.3IONS-SCNC: 10 MMOL/L (ref 9–17)
BUN SERPL-MCNC: 35 MG/DL (ref 8–23)
BUN/CREAT SERPL: 25 (ref 9–20)
CALCIUM SERPL-MCNC: 9.1 MG/DL (ref 8.6–10.4)
CHLORIDE SERPL-SCNC: 99 MMOL/L (ref 98–107)
CO2 SERPL-SCNC: 27 MMOL/L (ref 20–31)
CREAT SERPL-MCNC: 1.4 MG/DL (ref 0.7–1.2)
GFR SERPL CREATININE-BSD FRML MDRD: 51 ML/MIN/1.73M2
GLUCOSE SERPL-MCNC: 86 MG/DL (ref 70–99)
POTASSIUM SERPL-SCNC: 4.8 MMOL/L (ref 3.7–5.3)
SODIUM SERPL-SCNC: 136 MMOL/L (ref 135–144)

## 2023-11-20 PROCEDURE — 36415 COLL VENOUS BLD VENIPUNCTURE: CPT

## 2023-11-20 PROCEDURE — P9603 ONE-WAY ALLOW PRORATED MILES: HCPCS

## 2023-11-20 PROCEDURE — 80048 BASIC METABOLIC PNL TOTAL CA: CPT

## 2023-11-28 ENCOUNTER — HOSPITAL ENCOUNTER (OUTPATIENT)
Age: 78
Setting detail: SPECIMEN
Discharge: HOME | End: 2023-11-28

## 2023-11-28 LAB
ANION GAP SERPL CALCULATED.3IONS-SCNC: 9 MMOL/L (ref 9–17)
BUN SERPL-MCNC: 48 MG/DL (ref 8–23)
BUN/CREAT SERPL: 34 (ref 9–20)
CALCIUM SERPL-MCNC: 9.4 MG/DL (ref 8.6–10.4)
CHLORIDE SERPL-SCNC: 105 MMOL/L (ref 98–107)
CO2 SERPL-SCNC: 27 MMOL/L (ref 20–31)
CREAT SERPL-MCNC: 1.4 MG/DL (ref 0.7–1.2)
GFR SERPL CREATININE-BSD FRML MDRD: 51 ML/MIN/1.73M2
GLUCOSE SERPL-MCNC: 84 MG/DL (ref 70–99)
POTASSIUM SERPL-SCNC: 4.2 MMOL/L (ref 3.7–5.3)
SODIUM SERPL-SCNC: 141 MMOL/L (ref 135–144)

## 2023-11-28 PROCEDURE — 80048 BASIC METABOLIC PNL TOTAL CA: CPT

## 2023-11-28 PROCEDURE — P9603 ONE-WAY ALLOW PRORATED MILES: HCPCS

## 2023-11-28 PROCEDURE — 36415 COLL VENOUS BLD VENIPUNCTURE: CPT

## 2023-12-13 ENCOUNTER — OFFICE VISIT (OUTPATIENT)
Dept: ORTHOPEDIC SURGERY | Age: 78
End: 2023-12-13

## 2023-12-13 VITALS — WEIGHT: 242 LBS | HEIGHT: 71 IN | BODY MASS INDEX: 33.88 KG/M2 | RESPIRATION RATE: 14 BRPM

## 2023-12-13 DIAGNOSIS — S42.201D CLOSED FRACTURE OF PROXIMAL END OF RIGHT HUMERUS WITH ROUTINE HEALING, UNSPECIFIED FRACTURE MORPHOLOGY, SUBSEQUENT ENCOUNTER: Primary | ICD-10-CM

## 2023-12-16 NOTE — PROGRESS NOTES
HPI: Mr. Everton Jin is a 77-year-old gentleman approximately 2 months out from a closed right proximal humerus fracture that is being managed conservatively. He indicates that he has minimal pain in the shoulder and physical therapy is \"going good\". They continue to work with him in therapy on his mobility. Physical examination:  Evaluation the patient's right shoulder and upper extremity demonstrates intact skin without warmth or erythema. He has mild swelling still present. He is mildly tender to palpation over the shoulder as well. He has a 2+ radial pulse with brisk cap refill in his fingers. Actively he is unable to elevate or abduct the shoulder. Imaging studies 4 x-ray views of the right shoulder completed on 12/13/2023 were reviewed independently demonstrating a proximal humerus fracture with varus alignment which remains unchanged in alignment compared to previous x-rays. There does appear to be some interval increasing callus ration across the fracture site. Impression and plan:  Mr. Everton Jin is a 77-year-old gentleman approximately 2 months out from a closed right proximal humerus fracture. This is currently being managed conservatively. He demonstrates some mild increase in healing radiographically however clinically he is inability to actively elevate this arm is concerning for lack of healing. He is neurovascularly intact. I did have a discussion with the patient today about his progress. At this time he was encouraged to keep up with physical therapy working on active/active assisted range of motion. He may use the arm for light activities of daily living. I will see him back for reevaluation in a month with repeat x-rays.   He may return or call earlier with questions or concerns

## 2023-12-23 ENCOUNTER — HOSPITAL ENCOUNTER (EMERGENCY)
Age: 78
Discharge: ANOTHER HOSPITAL | DRG: 193 | End: 2023-12-23
Attending: EMERGENCY MEDICINE
Payer: MEDICARE

## 2023-12-23 ENCOUNTER — APPOINTMENT (OUTPATIENT)
Dept: GENERAL RADIOLOGY | Age: 78
DRG: 193 | End: 2023-12-23
Payer: MEDICARE

## 2023-12-23 VITALS
BODY MASS INDEX: 38.23 KG/M2 | OXYGEN SATURATION: 94 % | SYSTOLIC BLOOD PRESSURE: 112 MMHG | TEMPERATURE: 98.1 F | RESPIRATION RATE: 18 BRPM | DIASTOLIC BLOOD PRESSURE: 53 MMHG | WEIGHT: 274 LBS | HEART RATE: 61 BPM

## 2023-12-23 DIAGNOSIS — J18.9 PNEUMONIA DUE TO INFECTIOUS ORGANISM, UNSPECIFIED LATERALITY, UNSPECIFIED PART OF LUNG: ICD-10-CM

## 2023-12-23 DIAGNOSIS — M79.89 LEG SWELLING: Primary | ICD-10-CM

## 2023-12-23 LAB
ANION GAP SERPL CALCULATED.3IONS-SCNC: 11 MMOL/L (ref 9–17)
BASOPHILS # BLD: 0 K/UL (ref 0–0.2)
BASOPHILS NFR BLD: 1 % (ref 0–2)
BNP SERPL-MCNC: 6343 PG/ML
BUN SERPL-MCNC: 51 MG/DL (ref 8–23)
CALCIUM SERPL-MCNC: 9.5 MG/DL (ref 8.6–10.4)
CHLORIDE SERPL-SCNC: 102 MMOL/L (ref 98–107)
CO2 SERPL-SCNC: 23 MMOL/L (ref 20–31)
CREAT SERPL-MCNC: 1.6 MG/DL (ref 0.7–1.2)
EOSINOPHIL # BLD: 0.1 K/UL (ref 0–0.4)
EOSINOPHILS RELATIVE PERCENT: 1 % (ref 1–4)
ERYTHROCYTE [DISTWIDTH] IN BLOOD BY AUTOMATED COUNT: 16.8 % (ref 12.5–15.4)
GFR SERPL CREATININE-BSD FRML MDRD: 44 ML/MIN/1.73M2
GLUCOSE SERPL-MCNC: 113 MG/DL (ref 70–99)
HCT VFR BLD AUTO: 26.1 % (ref 41–53)
HGB BLD-MCNC: 8.7 G/DL (ref 13.5–17.5)
INR PPP: 1.3
LYMPHOCYTES NFR BLD: 1.2 K/UL (ref 1–4.8)
LYMPHOCYTES RELATIVE PERCENT: 17 % (ref 24–44)
MAGNESIUM SERPL-MCNC: 2.1 MG/DL (ref 1.6–2.6)
MCH RBC QN AUTO: 34 PG (ref 26–34)
MCHC RBC AUTO-ENTMCNC: 33.5 G/DL (ref 31–37)
MCV RBC AUTO: 101.6 FL (ref 80–100)
MONOCYTES NFR BLD: 0.7 K/UL (ref 0.1–1.2)
MONOCYTES NFR BLD: 10 % (ref 2–11)
NEUTROPHILS NFR BLD: 71 % (ref 36–66)
NEUTS SEG NFR BLD: 5.2 K/UL (ref 1.8–7.7)
PARTIAL THROMBOPLASTIN TIME: 31.8 SEC (ref 21.3–31.3)
PLATELET # BLD AUTO: 397 K/UL (ref 140–450)
PMV BLD AUTO: 8.2 FL (ref 6–12)
POTASSIUM SERPL-SCNC: 4.2 MMOL/L (ref 3.7–5.3)
PROTHROMBIN TIME: 13.8 SEC (ref 9.4–12.6)
RBC # BLD AUTO: 2.56 M/UL (ref 4.5–5.9)
SARS-COV-2 RDRP RESP QL NAA+PROBE: NOT DETECTED
SODIUM SERPL-SCNC: 136 MMOL/L (ref 135–144)
SPECIMEN DESCRIPTION: NORMAL
TROPONIN I SERPL HS-MCNC: 40 NG/L (ref 0–22)
TROPONIN I SERPL HS-MCNC: 40 NG/L (ref 0–22)
WBC OTHER # BLD: 7.3 K/UL (ref 3.5–11)

## 2023-12-23 PROCEDURE — 96366 THER/PROPH/DIAG IV INF ADDON: CPT | Performed by: EMERGENCY MEDICINE

## 2023-12-23 PROCEDURE — 83880 ASSAY OF NATRIURETIC PEPTIDE: CPT

## 2023-12-23 PROCEDURE — 2580000003 HC RX 258: Performed by: EMERGENCY MEDICINE

## 2023-12-23 PROCEDURE — 87635 SARS-COV-2 COVID-19 AMP PRB: CPT

## 2023-12-23 PROCEDURE — 84484 ASSAY OF TROPONIN QUANT: CPT

## 2023-12-23 PROCEDURE — 6360000002 HC RX W HCPCS: Performed by: EMERGENCY MEDICINE

## 2023-12-23 PROCEDURE — 80048 BASIC METABOLIC PNL TOTAL CA: CPT

## 2023-12-23 PROCEDURE — 85610 PROTHROMBIN TIME: CPT

## 2023-12-23 PROCEDURE — 71045 X-RAY EXAM CHEST 1 VIEW: CPT

## 2023-12-23 PROCEDURE — 93005 ELECTROCARDIOGRAM TRACING: CPT

## 2023-12-23 PROCEDURE — 85025 COMPLETE CBC W/AUTO DIFF WBC: CPT

## 2023-12-23 PROCEDURE — 6370000000 HC RX 637 (ALT 250 FOR IP): Performed by: EMERGENCY MEDICINE

## 2023-12-23 PROCEDURE — 36415 COLL VENOUS BLD VENIPUNCTURE: CPT

## 2023-12-23 PROCEDURE — 96365 THER/PROPH/DIAG IV INF INIT: CPT | Performed by: EMERGENCY MEDICINE

## 2023-12-23 PROCEDURE — 99285 EMERGENCY DEPT VISIT HI MDM: CPT | Performed by: EMERGENCY MEDICINE

## 2023-12-23 PROCEDURE — 83735 ASSAY OF MAGNESIUM: CPT

## 2023-12-23 PROCEDURE — 85730 THROMBOPLASTIN TIME PARTIAL: CPT

## 2023-12-23 PROCEDURE — 96375 TX/PRO/DX INJ NEW DRUG ADDON: CPT | Performed by: EMERGENCY MEDICINE

## 2023-12-23 RX ORDER — FERROUS SULFATE 325(65) MG
325 TABLET ORAL
COMMUNITY

## 2023-12-23 RX ORDER — HYDROCODONE BITARTRATE AND ACETAMINOPHEN 5; 325 MG/1; MG/1
1 TABLET ORAL ONCE
Status: COMPLETED | OUTPATIENT
Start: 2023-12-23 | End: 2023-12-23

## 2023-12-23 RX ORDER — PANTOPRAZOLE SODIUM 40 MG/1
40 TABLET, DELAYED RELEASE ORAL DAILY
COMMUNITY

## 2023-12-23 RX ORDER — TAMSULOSIN HYDROCHLORIDE 0.4 MG/1
0.4 CAPSULE ORAL DAILY
COMMUNITY

## 2023-12-23 RX ORDER — FUROSEMIDE 10 MG/ML
20 INJECTION INTRAMUSCULAR; INTRAVENOUS ONCE
Status: COMPLETED | OUTPATIENT
Start: 2023-12-23 | End: 2023-12-23

## 2023-12-23 RX ORDER — ACETAMINOPHEN 500 MG
500 TABLET ORAL EVERY 6 HOURS PRN
COMMUNITY

## 2023-12-23 RX ORDER — AMOXICILLIN 250 MG
1 CAPSULE ORAL 2 TIMES DAILY PRN
COMMUNITY

## 2023-12-23 RX ADMIN — FUROSEMIDE 20 MG: 10 INJECTION, SOLUTION INTRAMUSCULAR; INTRAVENOUS at 18:30

## 2023-12-23 RX ADMIN — HYDROCODONE BITARTRATE AND ACETAMINOPHEN 1 TABLET: 5; 325 TABLET ORAL at 20:36

## 2023-12-23 RX ADMIN — PIPERACILLIN AND TAZOBACTAM 3375 MG: 3; .375 INJECTION, POWDER, FOR SOLUTION INTRAVENOUS at 18:37

## 2023-12-23 NOTE — ED PROVIDER NOTES
12 [facility] Emergency Department  [id number] [facility]. [facility] [address]  Phone: [phone]  Fax: [phone]        Pt Name: Qi Saavedra  MRN: [id number]  [address] 1945  Date of evaluation: 12/23/23      CHIEF COMPLAINT     Chief Complaint   Patient presents with    Shortness of Breath     X1 week  Brought by squad from [facility]-pt is there for rehab for R shoulder    Leg Swelling     Bilateral leg swelling- gained 40 pounds in less than 2 weeks           HISTORY OF PRESENT ILLNESS    Qi Saavedra is a 66 y.o. male who presents to our Emergency Department. Patient presents emerged department from nursing facility for shortness of breath. Patient states that been ongoing for the past couple weeks. Patient states that he gained approximately 40 pounds over the past couple weeks. Patient states that he has a history of CHF as well as A-fib and he takes Eliquis for this. Patient states that he was recently in the hospital couple weeks ago for recurrent pleural effusions that required chest tube drainage. Patient states that he has improved since then but over the past 3 to 4 days he has progressively become more short of breath again. States that he has a slight cough. Has been taking his Eliquis. States that he has bilateral lower extremity edema. Has a recent fracture of his right humerus that they are treating nonop. Patient states he is slightly short of breath but thinks that is from fluid reaccumulation. They recently changed him from furosemide to torsemide and patient states that he has not been urinating as much as he used to          REVIEW OF SYSTEMS       Review of Systems   Constitutional:  Negative for chills, diaphoresis and fever. HENT:  Negative for drooling. Eyes:  Negative for redness. Respiratory:  Positive for cough and shortness of breath. Negative for chest tightness. Cardiovascular:  Positive for leg swelling.  Negative for chest

## 2023-12-23 NOTE — ED NOTES
Phoned bhanu nunez spoke with VALERIE Aponte-initiated transfer to [facility] per Dr. Kori Mehta

## 2023-12-24 ENCOUNTER — HOSPITAL ENCOUNTER (INPATIENT)
Age: 78
LOS: 3 days | Discharge: NURSING, REHAB OR LONG-TERM CARE | DRG: 193 | End: 2023-12-27
Attending: INTERNAL MEDICINE | Admitting: INTERNAL MEDICINE
Payer: MEDICARE

## 2023-12-24 PROBLEM — I50.9 ACUTE CONGESTIVE HEART FAILURE, UNSPECIFIED HEART FAILURE TYPE (HCC): Status: ACTIVE | Noted: 2023-12-24

## 2023-12-24 LAB
ANION GAP SERPL CALCULATED.3IONS-SCNC: 9 MMOL/L (ref 9–17)
BUN SERPL-MCNC: 50 MG/DL (ref 8–23)
BUN/CREAT SERPL: 31 (ref 9–20)
CALCIUM SERPL-MCNC: 9 MG/DL (ref 8.6–10.4)
CHLORIDE SERPL-SCNC: 103 MMOL/L (ref 98–107)
CO2 SERPL-SCNC: 22 MMOL/L (ref 20–31)
CREAT SERPL-MCNC: 1.6 MG/DL (ref 0.7–1.2)
GFR SERPL CREATININE-BSD FRML MDRD: 44 ML/MIN/1.73M2
GLUCOSE SERPL-MCNC: 83 MG/DL (ref 70–99)
IRON SATN MFR SERPL: 12 % (ref 20–55)
IRON SERPL-MCNC: 46 UG/DL (ref 59–158)
MAGNESIUM SERPL-MCNC: 2.1 MG/DL (ref 1.6–2.6)
POTASSIUM SERPL-SCNC: 4.2 MMOL/L (ref 3.7–5.3)
PROCALCITONIN SERPL-MCNC: 0.07 NG/ML
SODIUM SERPL-SCNC: 134 MMOL/L (ref 135–144)
TIBC SERPL-MCNC: 371 UG/DL (ref 250–450)
UNSATURATED IRON BINDING CAPACITY: 325 UG/DL (ref 112–347)

## 2023-12-24 PROCEDURE — 83540 ASSAY OF IRON: CPT

## 2023-12-24 PROCEDURE — 99233 SBSQ HOSP IP/OBS HIGH 50: CPT | Performed by: STUDENT IN AN ORGANIZED HEALTH CARE EDUCATION/TRAINING PROGRAM

## 2023-12-24 PROCEDURE — 83550 IRON BINDING TEST: CPT

## 2023-12-24 PROCEDURE — 6370000000 HC RX 637 (ALT 250 FOR IP): Performed by: NURSE PRACTITIONER

## 2023-12-24 PROCEDURE — 6370000000 HC RX 637 (ALT 250 FOR IP): Performed by: STUDENT IN AN ORGANIZED HEALTH CARE EDUCATION/TRAINING PROGRAM

## 2023-12-24 PROCEDURE — 36415 COLL VENOUS BLD VENIPUNCTURE: CPT

## 2023-12-24 PROCEDURE — 97530 THERAPEUTIC ACTIVITIES: CPT

## 2023-12-24 PROCEDURE — 97167 OT EVAL HIGH COMPLEX 60 MIN: CPT

## 2023-12-24 PROCEDURE — 2T015 HOSPITALIST 2ND TOUCH: CPT | Performed by: INTERNAL MEDICINE

## 2023-12-24 PROCEDURE — 97535 SELF CARE MNGMENT TRAINING: CPT

## 2023-12-24 PROCEDURE — 97116 GAIT TRAINING THERAPY: CPT

## 2023-12-24 PROCEDURE — 6360000002 HC RX W HCPCS: Performed by: INTERNAL MEDICINE

## 2023-12-24 PROCEDURE — 2060000000 HC ICU INTERMEDIATE R&B

## 2023-12-24 PROCEDURE — 97163 PT EVAL HIGH COMPLEX 45 MIN: CPT

## 2023-12-24 PROCEDURE — 80048 BASIC METABOLIC PNL TOTAL CA: CPT

## 2023-12-24 PROCEDURE — 6360000002 HC RX W HCPCS: Performed by: STUDENT IN AN ORGANIZED HEALTH CARE EDUCATION/TRAINING PROGRAM

## 2023-12-24 PROCEDURE — 84145 PROCALCITONIN (PCT): CPT

## 2023-12-24 PROCEDURE — 83735 ASSAY OF MAGNESIUM: CPT

## 2023-12-24 PROCEDURE — 2580000003 HC RX 258: Performed by: INTERNAL MEDICINE

## 2023-12-24 PROCEDURE — 6370000000 HC RX 637 (ALT 250 FOR IP): Performed by: INTERNAL MEDICINE

## 2023-12-24 PROCEDURE — 97110 THERAPEUTIC EXERCISES: CPT

## 2023-12-24 PROCEDURE — 2580000003 HC RX 258: Performed by: STUDENT IN AN ORGANIZED HEALTH CARE EDUCATION/TRAINING PROGRAM

## 2023-12-24 RX ORDER — ONDANSETRON 4 MG/1
4 TABLET, ORALLY DISINTEGRATING ORAL EVERY 8 HOURS PRN
Status: DISCONTINUED | OUTPATIENT
Start: 2023-12-24 | End: 2023-12-27 | Stop reason: HOSPADM

## 2023-12-24 RX ORDER — LANOLIN ALCOHOL/MO/W.PET/CERES
400 CREAM (GRAM) TOPICAL DAILY
Status: DISCONTINUED | OUTPATIENT
Start: 2023-12-24 | End: 2023-12-27 | Stop reason: HOSPADM

## 2023-12-24 RX ORDER — FOLIC ACID 1 MG/1
1 TABLET ORAL DAILY
Status: DISCONTINUED | OUTPATIENT
Start: 2023-12-24 | End: 2023-12-27 | Stop reason: HOSPADM

## 2023-12-24 RX ORDER — LEVOTHYROXINE SODIUM 0.07 MG/1
150 TABLET ORAL DAILY
Status: DISCONTINUED | OUTPATIENT
Start: 2023-12-24 | End: 2023-12-27 | Stop reason: HOSPADM

## 2023-12-24 RX ORDER — MIDODRINE HYDROCHLORIDE 5 MG/1
5 TABLET ORAL
Status: DISCONTINUED | OUTPATIENT
Start: 2023-12-24 | End: 2023-12-27 | Stop reason: HOSPADM

## 2023-12-24 RX ORDER — FENOFIBRATE 160 MG/1
160 TABLET ORAL DAILY
Status: DISCONTINUED | OUTPATIENT
Start: 2023-12-24 | End: 2023-12-27 | Stop reason: HOSPADM

## 2023-12-24 RX ORDER — SODIUM CHLORIDE 0.9 % (FLUSH) 0.9 %
10 SYRINGE (ML) INJECTION PRN
Status: DISCONTINUED | OUTPATIENT
Start: 2023-12-24 | End: 2023-12-27 | Stop reason: HOSPADM

## 2023-12-24 RX ORDER — SODIUM CHLORIDE 0.9 % (FLUSH) 0.9 %
5-40 SYRINGE (ML) INJECTION EVERY 12 HOURS SCHEDULED
Status: DISCONTINUED | OUTPATIENT
Start: 2023-12-24 | End: 2023-12-27 | Stop reason: HOSPADM

## 2023-12-24 RX ORDER — METOPROLOL SUCCINATE 50 MG/1
100 TABLET, EXTENDED RELEASE ORAL NIGHTLY
Status: DISCONTINUED | OUTPATIENT
Start: 2023-12-24 | End: 2023-12-27 | Stop reason: HOSPADM

## 2023-12-24 RX ORDER — POLYETHYLENE GLYCOL 3350 17 G/17G
17 POWDER, FOR SOLUTION ORAL DAILY PRN
Status: DISCONTINUED | OUTPATIENT
Start: 2023-12-24 | End: 2023-12-27 | Stop reason: HOSPADM

## 2023-12-24 RX ORDER — SODIUM CHLORIDE 9 MG/ML
INJECTION, SOLUTION INTRAVENOUS PRN
Status: DISCONTINUED | OUTPATIENT
Start: 2023-12-24 | End: 2023-12-27 | Stop reason: HOSPADM

## 2023-12-24 RX ORDER — SENNA AND DOCUSATE SODIUM 50; 8.6 MG/1; MG/1
1 TABLET, FILM COATED ORAL DAILY
Status: DISCONTINUED | OUTPATIENT
Start: 2023-12-24 | End: 2023-12-24

## 2023-12-24 RX ORDER — ACETAMINOPHEN 325 MG/1
650 TABLET ORAL EVERY 6 HOURS PRN
Status: DISCONTINUED | OUTPATIENT
Start: 2023-12-24 | End: 2023-12-27 | Stop reason: HOSPADM

## 2023-12-24 RX ORDER — POTASSIUM CHLORIDE 7.45 MG/ML
10 INJECTION INTRAVENOUS PRN
Status: DISCONTINUED | OUTPATIENT
Start: 2023-12-24 | End: 2023-12-27 | Stop reason: HOSPADM

## 2023-12-24 RX ORDER — AMLODIPINE BESYLATE 5 MG/1
5 TABLET ORAL DAILY
Status: DISCONTINUED | OUTPATIENT
Start: 2023-12-24 | End: 2023-12-27 | Stop reason: HOSPADM

## 2023-12-24 RX ORDER — ROSUVASTATIN CALCIUM 10 MG/1
20 TABLET, COATED ORAL DAILY
Status: DISCONTINUED | OUTPATIENT
Start: 2023-12-24 | End: 2023-12-27 | Stop reason: HOSPADM

## 2023-12-24 RX ORDER — PANTOPRAZOLE SODIUM 40 MG/1
40 TABLET, DELAYED RELEASE ORAL DAILY
Status: DISCONTINUED | OUTPATIENT
Start: 2023-12-24 | End: 2023-12-27 | Stop reason: HOSPADM

## 2023-12-24 RX ORDER — TAMSULOSIN HYDROCHLORIDE 0.4 MG/1
0.4 CAPSULE ORAL DAILY
Status: DISCONTINUED | OUTPATIENT
Start: 2023-12-24 | End: 2023-12-27 | Stop reason: HOSPADM

## 2023-12-24 RX ORDER — LANOLIN ALCOHOL/MO/W.PET/CERES
325 CREAM (GRAM) TOPICAL
Status: DISCONTINUED | OUTPATIENT
Start: 2023-12-24 | End: 2023-12-27 | Stop reason: HOSPADM

## 2023-12-24 RX ORDER — ACETAMINOPHEN 650 MG/1
650 SUPPOSITORY RECTAL EVERY 6 HOURS PRN
Status: DISCONTINUED | OUTPATIENT
Start: 2023-12-24 | End: 2023-12-27 | Stop reason: HOSPADM

## 2023-12-24 RX ORDER — FUROSEMIDE 10 MG/ML
40 INJECTION INTRAMUSCULAR; INTRAVENOUS 2 TIMES DAILY
Status: DISCONTINUED | OUTPATIENT
Start: 2023-12-24 | End: 2023-12-25

## 2023-12-24 RX ORDER — ONDANSETRON 2 MG/ML
4 INJECTION INTRAMUSCULAR; INTRAVENOUS EVERY 6 HOURS PRN
Status: DISCONTINUED | OUTPATIENT
Start: 2023-12-24 | End: 2023-12-27 | Stop reason: HOSPADM

## 2023-12-24 RX ORDER — AZITHROMYCIN 250 MG/1
500 TABLET, FILM COATED ORAL DAILY
Status: DISCONTINUED | OUTPATIENT
Start: 2023-12-24 | End: 2023-12-27 | Stop reason: HOSPADM

## 2023-12-24 RX ORDER — SENNA AND DOCUSATE SODIUM 50; 8.6 MG/1; MG/1
1 TABLET, FILM COATED ORAL 2 TIMES DAILY
Status: DISCONTINUED | OUTPATIENT
Start: 2023-12-24 | End: 2023-12-24

## 2023-12-24 RX ORDER — MAGNESIUM SULFATE IN WATER 40 MG/ML
2000 INJECTION, SOLUTION INTRAVENOUS PRN
Status: DISCONTINUED | OUTPATIENT
Start: 2023-12-24 | End: 2023-12-27 | Stop reason: HOSPADM

## 2023-12-24 RX ORDER — POTASSIUM CHLORIDE 20 MEQ/1
40 TABLET, EXTENDED RELEASE ORAL PRN
Status: DISCONTINUED | OUTPATIENT
Start: 2023-12-24 | End: 2023-12-27 | Stop reason: HOSPADM

## 2023-12-24 RX ADMIN — METOPROLOL SUCCINATE 100 MG: 50 TABLET, EXTENDED RELEASE ORAL at 20:07

## 2023-12-24 RX ADMIN — PANTOPRAZOLE SODIUM 40 MG: 40 TABLET, DELAYED RELEASE ORAL at 05:34

## 2023-12-24 RX ADMIN — LEVOTHYROXINE SODIUM 150 MCG: 0.07 TABLET ORAL at 05:34

## 2023-12-24 RX ADMIN — SODIUM CHLORIDE, PRESERVATIVE FREE 10 ML: 5 INJECTION INTRAVENOUS at 10:01

## 2023-12-24 RX ADMIN — CHOLECALCIFEROL TAB 125 MCG (5000 UNIT) 5000 UNITS: 125 TAB at 10:00

## 2023-12-24 RX ADMIN — FOLIC ACID 1 MG: 1 TABLET ORAL at 10:07

## 2023-12-24 RX ADMIN — TAMSULOSIN HYDROCHLORIDE 0.4 MG: 0.4 CAPSULE ORAL at 10:00

## 2023-12-24 RX ADMIN — AZITHROMYCIN DIHYDRATE 500 MG: 250 TABLET ORAL at 10:00

## 2023-12-24 RX ADMIN — MIDODRINE HYDROCHLORIDE 5 MG: 5 TABLET ORAL at 17:19

## 2023-12-24 RX ADMIN — FUROSEMIDE 40 MG: 10 INJECTION, SOLUTION INTRAMUSCULAR; INTRAVENOUS at 17:20

## 2023-12-24 RX ADMIN — FUROSEMIDE 40 MG: 10 INJECTION, SOLUTION INTRAMUSCULAR; INTRAVENOUS at 10:01

## 2023-12-24 RX ADMIN — CEFTRIAXONE SODIUM 1000 MG: 1 INJECTION, POWDER, FOR SOLUTION INTRAMUSCULAR; INTRAVENOUS at 23:42

## 2023-12-24 RX ADMIN — FENOFIBRATE 160 MG: 160 TABLET ORAL at 10:00

## 2023-12-24 RX ADMIN — FERROUS SULFATE TAB EC 325 MG (65 MG FE EQUIVALENT) 325 MG: 325 (65 FE) TABLET DELAYED RESPONSE at 10:00

## 2023-12-24 RX ADMIN — Medication 400 MG: at 10:00

## 2023-12-24 RX ADMIN — CEFTRIAXONE SODIUM 1000 MG: 1 INJECTION, POWDER, FOR SOLUTION INTRAMUSCULAR; INTRAVENOUS at 15:08

## 2023-12-24 RX ADMIN — APIXABAN 5 MG: 5 TABLET, FILM COATED ORAL at 10:00

## 2023-12-24 RX ADMIN — APIXABAN 5 MG: 5 TABLET, FILM COATED ORAL at 20:07

## 2023-12-24 RX ADMIN — ROSUVASTATIN CALCIUM 20 MG: 10 TABLET, FILM COATED ORAL at 10:00

## 2023-12-24 RX ADMIN — SODIUM CHLORIDE, PRESERVATIVE FREE 10 ML: 5 INJECTION INTRAVENOUS at 20:10

## 2023-12-24 NOTE — CONSULTS
Pulmonary Medicine and [address] Brock MD      Patient - Jatin Noyola   MRN -  [id number]   5 [facility] # - [de-identified]   - 1945      Date of Admission -  2023 12:25 AM  Date of evaluation -  2023  Room - -01   [address] - Blood, Whit Brock,  Primary Care Physician - ANDERSON Esquivel - CNP     Reason for Consult    Left pleural effusion    Assessment   Left recurrent pleural effusion, s/p Pleurx catheter from  till   X-ray chest in the a.m. to evaluate the need for thoracentesis  Depending upon the frequency of thoracentesis patient may need a new Pleurx catheter which was taken out secondary to nonfunctioning. Pneumonia  Zithromax  IV rocephin  CKD  Congestive heart failure   Cardiology on consult  IV lasix 40 mg BID  DVT prophylaxis, on eliquis  Will follow with you      Problem List      Patient Active Problem List   Diagnosis    Hyperlipidemia    Essential hypertension    Status post total hip replacement, left    Alcohol abuse    Depression    PAF (paroxysmal atrial fibrillation) (HCC)    Status post catheter ablation of atrial fibrillation    Long term current use of antiarrhythmic drug    Long term (current) use of anticoagulants    Macrocytic anemia    Chronic systolic congestive heart failure, NYHA class 3 (HCC)    Recurrent pleural effusion    Pleural effusion    Pleural effusion on left    Atelectasis, left    Chronic diastolic congestive heart failure ([address])    TIERNEY (acute kidney injury) ([address])    Leukocytosis    Arterial hypotension    Acute congestive heart failure, unspecified heart failure type [facility])       HPI     Jatin Noyola is 66 y.o.,  male, admitted because of shortness of breath and leg swelling. He is from an [facility] where he was doing rehab for his right shoulder. He has gain about 40 lbs in two weeks. He has a history of CHF and a-fib. He had a pleural drain which was removed about 1 week ago.  He has seen a

## 2023-12-24 NOTE — PROGRESS NOTES
shuffling steps, guarded  Gait Deviations: Slow Ignacia;Decreased step length;Decreased step height;Decreased head and trunk rotation; Shuffles  Distance: 15ft  Comments: Pt demonstrating fair- steadiness throughout ambulation short distances within room this date. Pt requiring mod-max verbal cueing to maintain HELDER within RW w/ poor return demo. Pt initially ambulating w/ step-to pattern however progressing to step-through throughout. Pt w/ decreased pace and trunk rotation, appearing generally guarded throughout. More Ambulation?: No  Stairs/Curb  Stairs?: No       Balance  Posture: Fair  Sitting - Static: Good;-  Sitting - Dynamic: Fair;+  Standing - Static: Fair;+  Standing - Dynamic: Fair;-  Single Leg Stance R Le  Single Leg Stance L Le  Comments: Standing balance assessed w/ RW  Exercise Treatment: standing pre-gait weight shifting, marches, STS x 2, posture correction, pursed lip breathing        AM-PAC - Mobility  AM-PAC Basic Mobility - Inpatient   How much help is needed turning from your back to your side while in a flat bed without using bedrails?: Total  How much help is needed moving from lying on your back to sitting on the side of a flat bed without using bedrails?: A Lot  How much help is needed moving to and from a bed to a chair?: A Lot  How much help is needed standing up from a chair using your arms?: A Lot  How much help is needed walking in hospital room?: A Little  How much help is needed climbing 3-5 steps with a railing?: A Lot  AM-PAC Inpatient Mobility Raw Score : 12  AM-PAC Inpatient T-Scale Score : 35.33  Mobility Inpatient CMS 0-100% Score: 68.66  Mobility Inpatient CMS G-Code Modifier : CL    Functional Outcome Measure-   Single Leg Stance Test:  0 sec.  (<5 sec.= fall risk)      Goals  Short Term Goals  Time Frame for Short Term Goals: 12 visits  Short Term Goal 1: Pt to demonstrate bed mobility SBA  Short Term Goal 2: Pt to perform STS transfers CGA  Short Term Goal 3: Pt to ambulate at least 50ft w/ RW independently  Short Term Goal 4: Pt to actively participate in at least 30 minutes of physical therapy for ther act, ther ex, balance, gait, and endurance training  Short Term Goal 5: Pt to be indep w/ pressure relief techniques in order to maintain skin integrity and prevent pressure injuries  Patient Goals   Patient Goals : To get better       Education  Patient Education  Education Given To: Patient  Education Provided: Role of Therapy;Plan of Care;Transfer Training;Energy Conservation;Equipment;Fall Prevention Strategies  Education Provided Comments: Pt educated on: purpose of acute PT eval, importance of continued mobility throughout admission, general safety awareness, safe transfers & ambulation w/ RW, fall risk prevention, pursed lip breathing, \"use it or lose it\" principle, and PT POC.  Pt w/ poor return demo.  Pt requires continued reinforcement of education.  Education Method: Demonstration;Verbal  Barriers to Learning: Cognition  Education Outcome: Continued education needed      Therapy Time   Individual Concurrent Group Co-treatment   Time In 0742         Time Out 0835         Minutes 53         Treatment time: 40 minutes     Co-treatment with OT warranted secondary to decreased safety and independence requiring 2 skilled therapy professionals to address individual discipline's goals. PT addressing weight shifting prior to transfers and transfer training.      Yenny Hilario, PT

## 2023-12-24 NOTE — DISCHARGE INSTR - COC
Continuity of Care Form    Patient Name: Tiffanie Medina   :  [id number]  MRN:  [id number]    [address] date:  2023  Discharge date:  23    Code Status Order: DNR-CCA   Advance Directives:     Admitting Physician:  Chano Martinez DO  PCP: ANDERSON Yanez CNP    Discharging Nurse: [address] Unit/Room#: 0388/7603-78  Discharging Unit Phone Number: [phone]    Emergency Contact:   Extended Emergency Contact Information  Primary Emergency Contact: [address] [facility] Phone: [phone]  Relation: Child  Secondary Emergency Contact: [address] [facility] Phone: [phone]  Relation: Child  Preferred language: English    Past Surgical History:  Past Surgical History:   Procedure Laterality Date    ATRIAL ABLATION SURGERY      JOINT REPLACEMENT Left     TONSILLECTOMY         Immunization History:   Immunization History   Administered Date(s) Administered    COVID-19, MODERNA BLUE border, Primary or Immunocompromised, (age 12y+), IM, 100 mcg/0.5mL 2021, 2021, 2022    Influenza, FLUAD, (age 72 y+), Adjuvanted, 0.5mL 2023    Pneumococcal, PCV20, PREVNAR 21, (age 6w+), IM, 0.5mL 2023    Pneumococcal, PPSV23, PNEUMOVAX 21, (age 2y+), SC/IM, 0.5mL 2022       Active Problems:  Patient Active Problem List   Diagnosis Code    Hyperlipidemia E78.5    Essential hypertension I10    Status post total hip replacement, 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 A    PAF (paroxysmal atrial fibrillation) ([facility]) I48.0    Status post catheter ablation of atrial fibrillation Z98.890    Long term current use of antiarrhythmic drug Z79.899    Long term (current) use of anticoagulants Z79.01    Macrocytic anemia D53.9    Chronic systolic congestive heart failure, NYHA class 3 ([facility]) I50.22    Recurrent pleural effusion J90    Pleural effusion J90    Pleural effusion on left J90    Atelectasis, left J98.11    Chronic diastolic congestive heart failure

## 2023-12-24 NOTE — H&P
[facility]  Office: [address], DO, Victorino Virk, DO, Brenda Bai, DO, Jesse Shannon Blood, DO, Mendel Casper MD, Dayday Carey MD, Zach Gill MD, Hannah Xiong MD,  Kamille Ya MD, Oneil Camarillo MD, Geoff Parada MD,  Marc Bower MD, Demar Colin MD, Ryan Aguiar DO, Gopal Jeffers MD,  Castillo Castillo DO, Alo Mcmullen MD, Kal Ni MD, Chris Bear MD, Sydney Hodges MD,  Selvin Mckinnon MD, Onur Kennedy MD, Andrea Garcia MD, Vel Carey MD, Khalif Live MD, Elda Seay MD, Nael Peralta DO, Dominique Gilliam DO, Renetta Dsouza MD,  Miguel Garcia MD, Laci King, CNP,  Renetta Richardson, CNP, Eveline Greene, CNP,  Shelton Clay, [facility], Mariann Andrew, CNP, Ron Dumont, CNP, Ramin Suero, CNP, Radha Dsouza, CNP, Flores Fortune, CNP, Joshua Reeves PAArshC, Bertha Blakely PAArshC, Juan Antonio CNP, Jon Whatley, CNS, Iain Pitts, CNP, Shruthi Cloud, CNP, Branden Byrne, CNP         [address]    HISTORY AND PHYSICAL EXAMINATION            Date:   12/24/2023  Patient name:  Qi Saavedra  Date of admission:  12/24/2023 12:25 AM  MRN:   [id number]  Account:  [de-identified]  YOB: 1945  PCP:    ANDERSON Chun CNP  Room:   8577/3168-93  Code Status:    DNR-CCA    Chief Complaint:     No chief complaint on file. Shortness of breath and cough    History Obtained From:     patient, electronic medical record    History of Present Illness:     Qi Saavedra is a 66 y.o. Unavailable / unknown male who presents with No chief complaint on file. and is admitted to the hospital for the management of Acute congestive heart failure, unspecified heart failure type ([address] 68-year-old male with a past medical history of CHF, A-fib, hypertension, proteinuria, and BPH presents emergency department for shortness of breath and cough for 1 week.   Patient was

## 2023-12-24 NOTE — PLAN OF CARE
Problem: Chronic Conditions and Co-morbidities  Goal: Patient's chronic conditions and co-morbidity symptoms are monitored and maintained or improved  12/24/2023 1527 by Bruce Villalobos RN  Outcome: Progressing  12/24/2023 0432 by Jean Boucher RN  Outcome: Progressing  Flowsheets (Taken 12/24/2023 0010)  Care Plan - Patient's Chronic Conditions and Co-Morbidity Symptoms are Monitored and Maintained or Improved:   Monitor and assess patient's chronic conditions and comorbid symptoms for stability, deterioration, or improvement   Collaborate with multidisciplinary team to address chronic and comorbid conditions and prevent exacerbation or deterioration   Update acute care plan with appropriate goals if chronic or comorbid symptoms are exacerbated and prevent overall improvement and discharge     Problem: Discharge Planning  Goal: Discharge to home or other facility with appropriate resources  12/24/2023 1527 by Bruce Villalobos RN  Outcome: Progressing  12/24/2023 0432 by Jean Boucher RN  Outcome: Progressing  Flowsheets (Taken 12/24/2023 0010)  Discharge to home or other facility with appropriate resources:   Identify barriers to discharge with patient and caregiver   Arrange for needed discharge resources and transportation as appropriate   Identify discharge learning needs (meds, wound care, etc)   Refer to discharge planning if patient needs post-hospital services based on physician order or complex needs related to functional status, cognitive ability or social support system     Problem: Safety - Adult  Goal: Free from fall injury  12/24/2023 1527 by Bruce Villalobos RN  Outcome: Progressing  12/24/2023 0432 by Jean Boucher RN  Outcome: Progressing  Flowsheets (Taken 12/24/2023 0010)  Free From Fall Injury:   Instruct family/caregiver on patient safety   Based on caregiver fall risk screen, instruct family/caregiver to ask for assistance with transferring infant if caregiver noted to have fall risk factors RN  Outcome: Progressing  12/24/2023 0432 by Joann Massey RN  Outcome: Progressing     Problem: Infection - Adult  Goal: Absence of infection at discharge  12/24/2023 1527 by Shani Cordoba RN  Outcome: Progressing  12/24/2023 0432 by Joann Massey RN  Outcome: Progressing  Goal: Absence of infection during hospitalization  12/24/2023 1527 by Shani Cordoba RN  Outcome: Progressing  12/24/2023 0432 by Joann Massey RN  Outcome: Progressing  Goal: Absence of fever/infection during anticipated neutropenic period  12/24/2023 1527 by Shani Cordoba RN  Outcome: Progressing  12/24/2023 0432 by Joann Massey RN  Outcome: Progressing     Problem: Hematologic - Adult  Goal: Maintains hematologic stability  12/24/2023 1527 by Shani Cordoba RN  Outcome: Progressing  12/24/2023 0432 by Joann Massey RN  Outcome: Progressing

## 2023-12-24 NOTE — PLAN OF CARE
Problem: Chronic Conditions and Co-morbidities  Goal: Patient's chronic conditions and co-morbidity symptoms are monitored and maintained or improved  Outcome: Progressing  Flowsheets (Taken 12/24/2023 0010)  Care Plan - Patient's Chronic Conditions and Co-Morbidity Symptoms are Monitored and Maintained or Improved:   Monitor and assess patient's chronic conditions and comorbid symptoms for stability, deterioration, or improvement   Collaborate with multidisciplinary team to address chronic and comorbid conditions and prevent exacerbation or deterioration   Update acute care plan with appropriate goals if chronic or comorbid symptoms are exacerbated and prevent overall improvement and discharge     Problem: Discharge Planning  Goal: Discharge to home or other facility with appropriate resources  Outcome: Progressing  Flowsheets (Taken 12/24/2023 0010)  Discharge to home or other facility with appropriate resources:   Identify barriers to discharge with patient and caregiver   Arrange for needed discharge resources and transportation as appropriate   Identify discharge learning needs (meds, wound care, etc)   Refer to discharge planning if patient needs post-hospital services based on physician order or complex needs related to functional status, cognitive ability or social support system     Problem: Safety - Adult  Goal: Free from fall injury  Outcome: Progressing  Flowsheets (Taken 12/24/2023 0010)  Free From Fall Injury:   Instruct family/caregiver on patient safety   Based on caregiver fall risk screen, instruct family/caregiver to ask for assistance with transferring infant if caregiver noted to have fall risk factors     Problem: Skin/Tissue Integrity  Goal: Absence of new skin breakdown  Description: 1. Monitor for areas of redness and/or skin breakdown  2. Assess vascular access sites hourly  3. Every 4-6 hours minimum:  Change oxygen saturation probe site  4.   Every 4-6 hours:  If on nasal continuous

## 2023-12-24 NOTE — PROGRESS NOTES
Occupational Therapy  Facility/Department: [facility] CVICU  Occupational Therapy Initial Assessment    Name: Ovidio Rosas  : 1945  MRN: [id number]  Date of Service: 2023    Discharge Recommendations:  Patient would benefit from continued therapy after discharge   Pt currently functioning below baseline. Recommend daily inpatient skilled therapy at time of discharge to maximize long term outcomes and prevent re-admission. Please refer to AM-PAC score for current level of function. Patient Diagnosis(es): There were no encounter diagnoses. Past Medical History:  has a past medical history of A-fib ([address]), Anxiety, Arrhythmia, Depression, Hyperlipidemia, Hypertension, and Obesity. Past Surgical History:  has a past surgical history that includes joint replacement (Left); Tonsillectomy; and Atrial ablation surgery (). HPI: Ovidio Rosas is a 66 y.o. Unavailable / unknown male who presents with No chief complaint on file. and is admitted to the hospital for the management of Acute congestive heart failure, unspecified heart failure type ([address] 69-year-old male with a past medical history of CHF, A-fib, hypertension, proteinuria, and BPH presents emergency department for shortness of breath and cough for 1 week. Patient was states he is gained 40 pounds since 1 month ago. [facility] of right shoulder has been difficulty performing rehab secondary to shortness of breath. He states his been compliant with outpatient follow-ups and home medications. He states that has been getting more lower extremity swelling than usual.  He states that last month he required a left pleural catheter secondary to effusion. Patient denies nausea, vomiting, diarrhea, headache, numbness, chest pain, fevers, or sick contacts. Assessment   Performance deficits / Impairments: Decreased endurance;Decreased functional mobility ; Decreased ADL status; Decreased   Naila Terrell, OT

## 2023-12-24 NOTE — PROGRESS NOTES
Pt admitted to room. Oriented to room, call light and bed mechanics. Side rails up x2. Call light within reach. Orders reviewed. Initial Vitals obtained. Initial assessment completed.

## 2023-12-24 NOTE — PLAN OF CARE
[facility]  Office: [address], DO, Kaylie Vargas, DO, Ervin Deal, DO, Kamran Shelley Blood, DO, Liv Veliz MD, Qi Jacobsen MD, Marah Serrano MD, Reginaldo Mendiola MD,  Reginald Vivar MD, Steve Traylor MD, Rosa Kahn MD,  Faiza Hunt MD, Meme Malik MD, Barrie Bolaños, DO, Kiya Mary MD,  Dorys Cid, DO, Eulalia Young MD, Kitty Valdes MD, Phyllis Lucero MD, Hannah Lam MD,  Monster Mayen MD, Levar Sauer MD, Hoa Will MD, Ryan Sewell MD, Latosha Ellsworth MD, Rafael Willis MD, Tonya Stoner, DO, Socrates Avitia, DO, Elena Valderrama MD,  Breanne Muse MD, Linden Carbone, CNP,  Alex Mead, CNP, Kristie Saliva, CNP,  Aurelio De Los Santos, DNP, Ingrid Breed, CNP, Silver Hora, CNP, Ivet Cosier, CNP, Brittany Lessdavid, CNP, Librado Ball, CNP, Dary Rodrigez PAArshC, Bertha Blakely PAArshC, Juan Antonio, CNP, Kathe Brewster, CNS, Mercer Fabry, CNP, Neal Lam, CNP, Larissa Collins, CNP         [place]    Second Visit Note  For more detailed information please refer to the progress note of the day      12/24/2023    4:01 PM    Name:   Antonio Ahumada  MRN:     [id number]     [address]:      <[facility]>   Room:   2040/2040-01   Day:  0  Admit Date:  12/24/2023 12:25 AM    PCP:   ANDERSON Denny CNP  Code Status:  DNR-CCA      Pt vitals were reviewed   New labs were reviewed   Patient was seen    Updated plan :     Massively volume overloaded, continue diuresis  May need delta Farley Blood, DO  12/24/2023  4:01 PM

## 2023-12-24 NOTE — PROGRESS NOTES
4 Eyes Skin Assessment     NAME:  Marleni Wooten OF BIRTH:  1945  MEDICAL RECORD NUMBER:  [id number]    The patient is being assessed for  Admission    I agree that at least one RN has performed a thorough Head to Toe Skin Assessment on the patient. ALL assessment sites listed below have been assessed. Areas assessed by both nurses:    Head, Face, Ears, Shoulders, Back, Chest, Arms, Elbows, Hands, Sacrum. Buttock, Coccyx, Ischium, and Legs. Feet and Heels        Does the Patient have a Wound? Yes wound(s) were present on assessment.  LDA wound assessment was Initiated and completed by RN       [place] Prevention initiated by RN: Yes  Wound Care Orders initiated by RN: No    Pressure Injury (Stage 3,4, Unstageable, DTI, NWPT, and Complex wounds) if present, place Wound referral order by RN under : No    New Ostomies, if present place, Ostomy referral order under : No     Nurse 1 eSignature: Electronically signed by Alecia Guy RN on 12/24/23 at 6:10 AM EST    **SHARE this note so that the co-signing nurse can place an eSignature**    Nurse 2 eSignature: Electronically signed by Miranda Serrano RN on 12/24/23 at 6:17 AM EST

## 2023-12-25 ENCOUNTER — APPOINTMENT (OUTPATIENT)
Dept: GENERAL RADIOLOGY | Age: 78
DRG: 193 | End: 2023-12-25
Attending: INTERNAL MEDICINE
Payer: MEDICARE

## 2023-12-25 PROBLEM — I50.33 ACUTE ON CHRONIC HEART FAILURE WITH PRESERVED EJECTION FRACTION (HCC): Status: ACTIVE | Noted: 2023-12-24

## 2023-12-25 LAB
ANION GAP SERPL CALCULATED.3IONS-SCNC: 10 MMOL/L (ref 9–17)
BNP SERPL-MCNC: 5463 PG/ML
BUN SERPL-MCNC: 43 MG/DL (ref 8–23)
BUN/CREAT SERPL: 27 (ref 9–20)
CALCIUM SERPL-MCNC: 8.8 MG/DL (ref 8.6–10.4)
CHLORIDE SERPL-SCNC: 102 MMOL/L (ref 98–107)
CO2 SERPL-SCNC: 25 MMOL/L (ref 20–31)
CREAT SERPL-MCNC: 1.6 MG/DL (ref 0.7–1.2)
GFR SERPL CREATININE-BSD FRML MDRD: 44 ML/MIN/1.73M2
GLUCOSE SERPL-MCNC: 94 MG/DL (ref 70–99)
MAGNESIUM SERPL-MCNC: 1.9 MG/DL (ref 1.6–2.6)
POTASSIUM SERPL-SCNC: 4.1 MMOL/L (ref 3.7–5.3)
SODIUM SERPL-SCNC: 137 MMOL/L (ref 135–144)

## 2023-12-25 PROCEDURE — 6360000002 HC RX W HCPCS: Performed by: INTERNAL MEDICINE

## 2023-12-25 PROCEDURE — 99232 SBSQ HOSP IP/OBS MODERATE 35: CPT | Performed by: INTERNAL MEDICINE

## 2023-12-25 PROCEDURE — 6370000000 HC RX 637 (ALT 250 FOR IP): Performed by: NURSE PRACTITIONER

## 2023-12-25 PROCEDURE — 36415 COLL VENOUS BLD VENIPUNCTURE: CPT

## 2023-12-25 PROCEDURE — 6370000000 HC RX 637 (ALT 250 FOR IP): Performed by: STUDENT IN AN ORGANIZED HEALTH CARE EDUCATION/TRAINING PROGRAM

## 2023-12-25 PROCEDURE — 2060000000 HC ICU INTERMEDIATE R&B

## 2023-12-25 PROCEDURE — 6360000002 HC RX W HCPCS: Performed by: STUDENT IN AN ORGANIZED HEALTH CARE EDUCATION/TRAINING PROGRAM

## 2023-12-25 PROCEDURE — 83880 ASSAY OF NATRIURETIC PEPTIDE: CPT

## 2023-12-25 PROCEDURE — 83735 ASSAY OF MAGNESIUM: CPT

## 2023-12-25 PROCEDURE — 80048 BASIC METABOLIC PNL TOTAL CA: CPT

## 2023-12-25 PROCEDURE — 2580000003 HC RX 258: Performed by: INTERNAL MEDICINE

## 2023-12-25 PROCEDURE — 71045 X-RAY EXAM CHEST 1 VIEW: CPT

## 2023-12-25 PROCEDURE — 6370000000 HC RX 637 (ALT 250 FOR IP): Performed by: INTERNAL MEDICINE

## 2023-12-25 RX ORDER — FUROSEMIDE 10 MG/ML
60 INJECTION INTRAMUSCULAR; INTRAVENOUS 2 TIMES DAILY
Status: DISCONTINUED | OUTPATIENT
Start: 2023-12-25 | End: 2023-12-27

## 2023-12-25 RX ADMIN — FOLIC ACID 1 MG: 1 TABLET ORAL at 08:34

## 2023-12-25 RX ADMIN — CHOLECALCIFEROL TAB 125 MCG (5000 UNIT) 5000 UNITS: 125 TAB at 08:35

## 2023-12-25 RX ADMIN — TAMSULOSIN HYDROCHLORIDE 0.4 MG: 0.4 CAPSULE ORAL at 08:34

## 2023-12-25 RX ADMIN — METOPROLOL SUCCINATE 100 MG: 50 TABLET, EXTENDED RELEASE ORAL at 22:26

## 2023-12-25 RX ADMIN — FUROSEMIDE 60 MG: 10 INJECTION, SOLUTION INTRAMUSCULAR; INTRAVENOUS at 18:22

## 2023-12-25 RX ADMIN — MIDODRINE HYDROCHLORIDE 5 MG: 5 TABLET ORAL at 08:34

## 2023-12-25 RX ADMIN — AZITHROMYCIN DIHYDRATE 500 MG: 250 TABLET ORAL at 08:34

## 2023-12-25 RX ADMIN — ACETAMINOPHEN 650 MG: 325 TABLET ORAL at 14:29

## 2023-12-25 RX ADMIN — PANTOPRAZOLE SODIUM 40 MG: 40 TABLET, DELAYED RELEASE ORAL at 06:05

## 2023-12-25 RX ADMIN — FERROUS SULFATE TAB EC 325 MG (65 MG FE EQUIVALENT) 325 MG: 325 (65 FE) TABLET DELAYED RESPONSE at 08:34

## 2023-12-25 RX ADMIN — FUROSEMIDE 40 MG: 10 INJECTION, SOLUTION INTRAMUSCULAR; INTRAVENOUS at 08:35

## 2023-12-25 RX ADMIN — APIXABAN 5 MG: 5 TABLET, FILM COATED ORAL at 22:26

## 2023-12-25 RX ADMIN — Medication 400 MG: at 08:34

## 2023-12-25 RX ADMIN — APIXABAN 5 MG: 5 TABLET, FILM COATED ORAL at 08:34

## 2023-12-25 RX ADMIN — ROSUVASTATIN CALCIUM 20 MG: 10 TABLET, FILM COATED ORAL at 08:34

## 2023-12-25 RX ADMIN — MIDODRINE HYDROCHLORIDE 5 MG: 5 TABLET ORAL at 18:23

## 2023-12-25 RX ADMIN — SODIUM CHLORIDE, PRESERVATIVE FREE 10 ML: 5 INJECTION INTRAVENOUS at 22:28

## 2023-12-25 RX ADMIN — FENOFIBRATE 160 MG: 160 TABLET ORAL at 08:34

## 2023-12-25 RX ADMIN — MIDODRINE HYDROCHLORIDE 5 MG: 5 TABLET ORAL at 14:25

## 2023-12-25 RX ADMIN — SODIUM CHLORIDE, PRESERVATIVE FREE 10 ML: 5 INJECTION INTRAVENOUS at 08:39

## 2023-12-25 ASSESSMENT — PAIN SCALES - GENERAL: PAINLEVEL_OUTOF10: 4

## 2023-12-25 ASSESSMENT — PAIN - FUNCTIONAL ASSESSMENT: PAIN_FUNCTIONAL_ASSESSMENT: ACTIVITIES ARE NOT PREVENTED

## 2023-12-25 ASSESSMENT — PAIN DESCRIPTION - LOCATION: LOCATION: HEAD

## 2023-12-25 ASSESSMENT — PAIN DESCRIPTION - DESCRIPTORS: DESCRIPTORS: DISCOMFORT;ACHING

## 2023-12-25 NOTE — PLAN OF CARE
Problem: Chronic Conditions and Co-morbidities  Goal: Patient's chronic conditions and co-morbidity symptoms are monitored and maintained or improved  Outcome: Progressing     Problem: Discharge Planning  Goal: Discharge to home or other facility with appropriate resources  Outcome: Progressing     Problem: Safety - Adult  Goal: Free from fall injury  Outcome: Progressing     Problem: Skin/Tissue Integrity  Goal: Absence of new skin breakdown  Description: 1. Monitor for areas of redness and/or skin breakdown  2. Assess vascular access sites hourly  3. Every 4-6 hours minimum:  Change oxygen saturation probe site  4. Every 4-6 hours:  If on nasal continuous positive airway pressure, respiratory therapy assess nares and determine need for appliance change or resting period.   Outcome: Progressing     Problem: ABCDS Injury Assessment  Goal: Absence of physical injury  Outcome: Progressing     Problem: Respiratory - Adult  Goal: Achieves optimal ventilation and oxygenation  Outcome: Progressing     Problem: Cardiovascular - Adult  Goal: Maintains optimal cardiac output and hemodynamic stability  Outcome: Progressing  Goal: Absence of cardiac dysrhythmias or at baseline  Outcome: Progressing     Problem: Skin/Tissue Integrity - Adult  Goal: Skin integrity remains intact  Outcome: Progressing  Goal: Incisions, wounds, or drain sites healing without S/S of infection  Outcome: Progressing  Goal: Oral mucous membranes remain intact  Outcome: Progressing     Problem: Infection - Adult  Goal: Absence of infection at discharge  Outcome: Progressing  Goal: Absence of infection during hospitalization  Outcome: Progressing  Goal: Absence of fever/infection during anticipated neutropenic period  Outcome: Progressing     Problem: Hematologic - Adult  Goal: Maintains hematologic stability  Outcome: Progressing

## 2023-12-25 NOTE — PROGRESS NOTES
Nutrition Education    Educated on Importance of weight monitoring, and following a low sodium diet. Learners: Patient  Readiness: Eager  Method: Explanation and Handout  Response: Verbalizes Understanding  Contact name and number provided.     So [address] RD  Contact Number: [phone]

## 2023-12-25 NOTE — PLAN OF CARE
Problem: Chronic Conditions and Co-morbidities  Goal: Patient's chronic conditions and co-morbidity symptoms are monitored and maintained or improved  12/25/2023 1603 by Mami Graham RN  Outcome: Progressing  12/25/2023 0529 by Duncan Serrano RN  Outcome: Progressing     Problem: Discharge Planning  Goal: Discharge to home or other facility with appropriate resources  12/25/2023 1603 by Mami Graham RN  Outcome: Progressing  12/25/2023 0529 by Duncan Serrano RN  Outcome: Progressing     Problem: Safety - Adult  Goal: Free from fall injury  12/25/2023 1603 by Mami Graham RN  Outcome: Progressing  12/25/2023 0529 by Duncan Serrano RN  Outcome: Progressing     Problem: Skin/Tissue Integrity  Goal: Absence of new skin breakdown  Description: 1. Monitor for areas of redness and/or skin breakdown  2. Assess vascular access sites hourly  3. Every 4-6 hours minimum:  Change oxygen saturation probe site  4. Every 4-6 hours:  If on nasal continuous positive airway pressure, respiratory therapy assess nares and determine need for appliance change or resting period.   12/25/2023 1603 by Mami Graham RN  Outcome: Progressing  12/25/2023 0529 by Duncan Serrano RN  Outcome: Progressing     Problem: ABCDS Injury Assessment  Goal: Absence of physical injury  12/25/2023 1603 by Mami Graham RN  Outcome: Progressing  12/25/2023 0529 by Duncan Serrano RN  Outcome: Progressing     Problem: Respiratory - Adult  Goal: Achieves optimal ventilation and oxygenation  12/25/2023 1603 by Mami Graham RN  Outcome: Progressing  12/25/2023 0529 by Duncan Serrano RN  Outcome: Progressing     Problem: Cardiovascular - Adult  Goal: Maintains optimal cardiac output and hemodynamic stability  12/25/2023 1603 by Mami Graham RN  Outcome: Progressing  12/25/2023 0529 by Duncan Serrano RN  Outcome: Progressing  Goal: Absence of cardiac dysrhythmias or at

## 2023-12-25 NOTE — PROGRESS NOTES
[facility]  Office: [id number]  1826, DO, Tristin Baptist, DO, Jim Zapata, DO, Breonna Nash Blood, DO, Елена Wise MD, Eliu Richard MD, Edith Harris MD, Raphael Garcia MD,  Madhavi Garcia MD, Caryl Hendrix MD, Richard Monson MD,  Onofre Dennis MD, Ayan Bazan MD, Shimon Bobo, DO, Raj Vu MD,  Digna Mayer DO, Nakul Victoria MD, Nelly Badillo MD, Armida Tierney MD, Ancelmo Gutierres MD,  Emani Brown MD, Jocelynn Hector MD, Mae Zepeda MD, Emil Mckeon MD, Caryl Dominguez MD, Addison Del Rio MD, Eleanor Cuellar, DO, Delcia Epley, DO, Clara Bone MD,  Modesto Rivera MD, Alaina Saucedo, CNP,  Gennaro Beyer, CNP, Lora Madrid, CNP,  Roddy Willson, [facility], Joseph Ruiz, CNP, Chadwick Locke, CNP, Mandy Skinner, CNP, Jeanie Mccormack, CNP, Sally Box, CNP, Shazia Fam, PA-C, Bertha Blakely PA-C, Juan Antonio, CNP, Kiya Abreu, CNS, Chan Bauer, CNP, Precious Vallecillo, CNP, [facility], [facility]    Progress Note    12/25/2023    11:32 AM    Name:   Ovidio Rosas  MRN:     [id number]     [address]:      [de-identified]   Room:   2040/2040-01   Day:  1  Admit Date:  12/24/2023 12:25 AM    PCP:   ANDERSON Izaguirre CNP  Code Status:  DNR-CCA    Subjective:     C/C: weight gain  Interval History Status: improved. Legs were seeping yesterday, are not today  Denies cp/sob/n/v    Brief History:     Per my partner:  Ovidio Rosas is a 66 y.o. Unavailable / unknown male who presents with No chief complaint on file. and is admitted to the hospital for the management of Acute congestive heart failure, unspecified heart failure type ([address]). 66-year-old male with a past medical history of CHF, A-fib, hypertension, proteinuria, and BPH presents emergency department for shortness of breath and cough for 1 week. Patient was states he is gained 40 pounds since 1 month ago. baseline cr probably 1.1    Krunal BAILEY Blood, DO  12/25/2023  11:32 AM

## 2023-12-26 ENCOUNTER — APPOINTMENT (OUTPATIENT)
Dept: GENERAL RADIOLOGY | Age: 78
DRG: 193 | End: 2023-12-26
Attending: INTERNAL MEDICINE
Payer: MEDICARE

## 2023-12-26 ENCOUNTER — APPOINTMENT (OUTPATIENT)
Dept: INTERVENTIONAL RADIOLOGY/VASCULAR | Age: 78
DRG: 193 | End: 2023-12-26
Attending: INTERNAL MEDICINE
Payer: MEDICARE

## 2023-12-26 LAB
ANION GAP SERPL CALCULATED.3IONS-SCNC: 11 MMOL/L (ref 9–17)
BUN SERPL-MCNC: 39 MG/DL (ref 8–23)
BUN/CREAT SERPL: 22 (ref 9–20)
CALCIUM SERPL-MCNC: 8.9 MG/DL (ref 8.6–10.4)
CHLORIDE SERPL-SCNC: 99 MMOL/L (ref 98–107)
CO2 SERPL-SCNC: 25 MMOL/L (ref 20–31)
CREAT SERPL-MCNC: 1.8 MG/DL (ref 0.7–1.2)
GFR SERPL CREATININE-BSD FRML MDRD: 38 ML/MIN/1.73M2
GLUCOSE SERPL-MCNC: 83 MG/DL (ref 70–99)
MAGNESIUM SERPL-MCNC: 1.9 MG/DL (ref 1.6–2.6)
POTASSIUM SERPL-SCNC: 3.8 MMOL/L (ref 3.7–5.3)
PROCALCITONIN SERPL-MCNC: 0.11 NG/ML
SODIUM SERPL-SCNC: 135 MMOL/L (ref 135–144)

## 2023-12-26 PROCEDURE — 94761 N-INVAS EAR/PLS OXIMETRY MLT: CPT

## 2023-12-26 PROCEDURE — 36415 COLL VENOUS BLD VENIPUNCTURE: CPT

## 2023-12-26 PROCEDURE — 6360000002 HC RX W HCPCS: Performed by: STUDENT IN AN ORGANIZED HEALTH CARE EDUCATION/TRAINING PROGRAM

## 2023-12-26 PROCEDURE — 2580000003 HC RX 258: Performed by: INTERNAL MEDICINE

## 2023-12-26 PROCEDURE — 84145 PROCALCITONIN (PCT): CPT

## 2023-12-26 PROCEDURE — 76604 US EXAM CHEST: CPT

## 2023-12-26 PROCEDURE — 2580000003 HC RX 258: Performed by: STUDENT IN AN ORGANIZED HEALTH CARE EDUCATION/TRAINING PROGRAM

## 2023-12-26 PROCEDURE — 80048 BASIC METABOLIC PNL TOTAL CA: CPT

## 2023-12-26 PROCEDURE — 97535 SELF CARE MNGMENT TRAINING: CPT

## 2023-12-26 PROCEDURE — 2060000000 HC ICU INTERMEDIATE R&B

## 2023-12-26 PROCEDURE — 6370000000 HC RX 637 (ALT 250 FOR IP): Performed by: NURSE PRACTITIONER

## 2023-12-26 PROCEDURE — 6370000000 HC RX 637 (ALT 250 FOR IP): Performed by: STUDENT IN AN ORGANIZED HEALTH CARE EDUCATION/TRAINING PROGRAM

## 2023-12-26 PROCEDURE — 99232 SBSQ HOSP IP/OBS MODERATE 35: CPT | Performed by: INTERNAL MEDICINE

## 2023-12-26 PROCEDURE — 97530 THERAPEUTIC ACTIVITIES: CPT

## 2023-12-26 PROCEDURE — 71045 X-RAY EXAM CHEST 1 VIEW: CPT

## 2023-12-26 PROCEDURE — 97110 THERAPEUTIC EXERCISES: CPT

## 2023-12-26 PROCEDURE — 97116 GAIT TRAINING THERAPY: CPT

## 2023-12-26 PROCEDURE — 6370000000 HC RX 637 (ALT 250 FOR IP): Performed by: INTERNAL MEDICINE

## 2023-12-26 PROCEDURE — 0BJQ3ZZ INSPECTION OF PLEURA, PERCUTANEOUS APPROACH: ICD-10-PCS | Performed by: RADIOLOGY

## 2023-12-26 PROCEDURE — 83735 ASSAY OF MAGNESIUM: CPT

## 2023-12-26 RX ORDER — LIDOCAINE 4 G/G
1 PATCH TOPICAL 2 TIMES DAILY
COMMUNITY

## 2023-12-26 RX ORDER — IPRATROPIUM BROMIDE AND ALBUTEROL SULFATE 2.5; .5 MG/3ML; MG/3ML
1 SOLUTION RESPIRATORY (INHALATION) EVERY 4 HOURS PRN
COMMUNITY

## 2023-12-26 RX ORDER — GUAIFENESIN/DEXTROMETHORPHAN 100-10MG/5
10 SYRUP ORAL 4 TIMES DAILY PRN
COMMUNITY

## 2023-12-26 RX ADMIN — MIDODRINE HYDROCHLORIDE 5 MG: 5 TABLET ORAL at 17:27

## 2023-12-26 RX ADMIN — FOLIC ACID 1 MG: 1 TABLET ORAL at 11:09

## 2023-12-26 RX ADMIN — FERROUS SULFATE TAB EC 325 MG (65 MG FE EQUIVALENT) 325 MG: 325 (65 FE) TABLET DELAYED RESPONSE at 11:07

## 2023-12-26 RX ADMIN — FUROSEMIDE 60 MG: 10 INJECTION, SOLUTION INTRAMUSCULAR; INTRAVENOUS at 12:45

## 2023-12-26 RX ADMIN — FENOFIBRATE 160 MG: 160 TABLET ORAL at 11:07

## 2023-12-26 RX ADMIN — TAMSULOSIN HYDROCHLORIDE 0.4 MG: 0.4 CAPSULE ORAL at 11:06

## 2023-12-26 RX ADMIN — AZITHROMYCIN DIHYDRATE 500 MG: 250 TABLET ORAL at 11:07

## 2023-12-26 RX ADMIN — Medication 400 MG: at 11:09

## 2023-12-26 RX ADMIN — CHOLECALCIFEROL TAB 125 MCG (5000 UNIT) 5000 UNITS: 125 TAB at 11:07

## 2023-12-26 RX ADMIN — APIXABAN 5 MG: 5 TABLET, FILM COATED ORAL at 11:06

## 2023-12-26 RX ADMIN — SODIUM CHLORIDE, PRESERVATIVE FREE 10 ML: 5 INJECTION INTRAVENOUS at 11:10

## 2023-12-26 RX ADMIN — LEVOTHYROXINE SODIUM 150 MCG: 0.07 TABLET ORAL at 05:59

## 2023-12-26 RX ADMIN — ACETAMINOPHEN 650 MG: 325 TABLET ORAL at 12:45

## 2023-12-26 RX ADMIN — SODIUM CHLORIDE, PRESERVATIVE FREE 10 ML: 5 INJECTION INTRAVENOUS at 21:18

## 2023-12-26 RX ADMIN — METOPROLOL SUCCINATE 100 MG: 50 TABLET, EXTENDED RELEASE ORAL at 21:14

## 2023-12-26 RX ADMIN — MIDODRINE HYDROCHLORIDE 5 MG: 5 TABLET ORAL at 11:08

## 2023-12-26 RX ADMIN — ROSUVASTATIN CALCIUM 20 MG: 10 TABLET, FILM COATED ORAL at 11:07

## 2023-12-26 RX ADMIN — FUROSEMIDE 60 MG: 10 INJECTION, SOLUTION INTRAMUSCULAR; INTRAVENOUS at 17:27

## 2023-12-26 RX ADMIN — APIXABAN 5 MG: 5 TABLET, FILM COATED ORAL at 21:11

## 2023-12-26 RX ADMIN — CEFTRIAXONE SODIUM 1000 MG: 1 INJECTION, POWDER, FOR SOLUTION INTRAMUSCULAR; INTRAVENOUS at 01:35

## 2023-12-26 RX ADMIN — PANTOPRAZOLE SODIUM 40 MG: 40 TABLET, DELAYED RELEASE ORAL at 05:59

## 2023-12-26 NOTE — PLAN OF CARE
Problem: Chronic Conditions and Co-morbidities  Goal: Patient's chronic conditions and co-morbidity symptoms are monitored and maintained or improved  12/25/2023 2352 by Karoline Morales RN  Outcome: Progressing  Flowsheets (Taken 12/25/2023 2000)  Care Plan - Patient's Chronic Conditions and Co-Morbidity Symptoms are Monitored and Maintained or Improved:   Monitor and assess patient's chronic conditions and comorbid symptoms for stability, deterioration, or improvement   Collaborate with multidisciplinary team to address chronic and comorbid conditions and prevent exacerbation or deterioration   Update acute care plan with appropriate goals if chronic or comorbid symptoms are exacerbated and prevent overall improvement and discharge  12/25/2023 1603 by Simon Potts RN  Outcome: Progressing     Problem: Discharge Planning  Goal: Discharge to home or other facility with appropriate resources  12/25/2023 2352 by Karoline Morales RN  Outcome: Progressing  Flowsheets (Taken 12/25/2023 2000)  Discharge to home or other facility with appropriate resources:   Identify barriers to discharge with patient and caregiver   Arrange for needed discharge resources and transportation as appropriate   Identify discharge learning needs (meds, wound care, etc)   Refer to discharge planning if patient needs post-hospital services based on physician order or complex needs related to functional status, cognitive ability or social support system  12/25/2023 1603 by Simon Potts RN  Outcome: Progressing     Problem: Safety - Adult  Goal: Free from fall injury  12/25/2023 2352 by Karoline Morales RN  Outcome: Progressing  Flowsheets (Taken 12/25/2023 2000)  Free From Fall Injury:   Instruct family/caregiver on patient safety   Based on caregiver fall risk screen, instruct family/caregiver to ask for assistance with transferring infant if caregiver noted to have fall risk factors  12/25/2023 1603 by Simon Potts RN  Outcome: Progressing output and notify Licensed Independent Practitioner for values outside of normal range   Assess for signs of decreased cardiac output  12/25/2023 1603 by Shani Cordoba RN  Outcome: Progressing  Goal: Absence of cardiac dysrhythmias or at baseline  12/25/2023 2352 by Joann Massey RN  Outcome: Progressing  Flowsheets (Taken 12/25/2023 2000)  Absence of cardiac dysrhythmias or at baseline:   Monitor cardiac rate and rhythm   Assess for signs of decreased cardiac output  12/25/2023 1603 by Shani Cordoba RN  Outcome: Progressing     Problem: Skin/Tissue Integrity - Adult  Goal: Skin integrity remains intact  12/25/2023 2352 by Joann Massey RN  Outcome: Progressing  Flowsheets (Taken 12/25/2023 2000)  Skin Integrity Remains Intact:   Monitor for areas of redness and/or skin breakdown   Assess vascular access sites hourly  12/25/2023 1603 by Shani Cordoba RN  Outcome: Progressing  Goal: Incisions, wounds, or drain sites healing without S/S of infection  12/25/2023 2352 by Joann Massey RN  Outcome: Progressing  12/25/2023 1603 by Shani Cordoba RN  Outcome: Progressing  Goal: Oral mucous membranes remain intact  12/25/2023 2352 by Joann Massey RN  Outcome: Progressing  Flowsheets (Taken 12/25/2023 2000)  Oral Mucous Membranes Remain Intact:   Assess oral mucosa and hygiene practices   Implement preventative oral hygiene regimen   Implement oral medicated treatments as ordered  12/25/2023 1603 by Shani Cordoba RN  Outcome: Progressing     Problem: Infection - Adult  Goal: Absence of infection at discharge  12/25/2023 2352 by Joann Massey RN  Outcome: Progressing  Flowsheets (Taken 12/25/2023 2000)  Absence of infection at discharge:   Assess and monitor for signs and symptoms of infection   Monitor lab/diagnostic results   Monitor all insertion sites i.e., indwelling lines, tubes and drains   Monitor endotracheal (as able) and nasal secretions for changes in amount and color   Administer medications as

## 2023-12-26 NOTE — PROGRESS NOTES
positioning with assistive devices with inconsistant return on education.      Environmental Mobility  Ambulation  Surface: Level surface  Device: Rolling walker  Distance: 10 feet  Activity: Within Room  Activity Comments: patient with poor positioning with assistive device and decreased safety awareness throughout  Additional Factors: Set-up;Verbal cues;Hand placement cues;Increased time to complete  Assistance Level: Minimal assistance;Moderate assistance;Requires x 2 assistance  Gait Deviations: Decreased heel strike right;Decreased heel strike left    Neuromuscular Education  Neuromuscular Education: Yes Gait ;Lower extremity;Sitting;Standing    PT Exercises  A/AROM Exercises: Seated ANEUDY LE AROM See attached HEP  Breathing Techniques: Pursed lip breathing techniques and the purspose and desired effect of technique  Disease-specific Exercises: Patient provided educational handout regarding heart failure.  Patient educated on things to watch for (new or worsening SOB, increases in physical activity, new or worsening swelling of feet and legs, no significant weight gain(+2 lbs in a day) (+5 lbs in a week), or chest pain.  Patient knows to pay attention to dry hacking cough, worsening SOB with activity, discomfort or swelling of abdomen, or trouble sleeping.  Patient is educated on seeking medical attention with frequent dry hacking cough, SOB at rest, increased discomfort or swelling in the lower body, sudden weight gain of more than 2-3 lbs in a 24 hour period (or 5 lbs in a week), new or worsening dizziness, confusion, sadness, or depression, loss of appetite, or increase trouble sleeping ( cannot lie flat).  Patient given trackers for weight, BP, pulse and Oxygen levels.    Access Code: [email or web address]  URL: [email or web address]  Date: 12/26/2023  Prepared by: MADDIE GRUBER    Exercises  - Correct Seated Posture  - 1 x daily - 7 x weekly - 3 sets - 10 reps  - Seated Heel Raise  - 1 x daily - 7 x weekly -  3 sets - 10 reps  - Seated Toe Raise  - 1 x daily - 7 x weekly - 3 sets - 10 reps  - Seated Long Arc Quad  - 1 x daily - 7 x weekly - 3 sets - 10 reps  - Seated March  - 1 x daily - 7 x weekly - 3 sets - 10 reps  - Seated Hip Adduction Isometrics with Ball  - 1 x daily - 7 x weekly - 3 sets - 10 reps  - Seated Gluteal Sets  - 1 x daily - 7 x weekly - 3 sets - 10 reps  - Seated Hip Abduction  - 1 x daily - 7 x weekly - 3 sets - 10 reps    ASSESSMENT/PROGRESS TOWARDS GOALS     Assessment  Assessment: Patient with global decondiioning and requires increased time and effort for minimal progression. Patient with CHF and requires frequent rest breaks and education on task managment throughout. Activity Tolerance: Patient limited by endurance; Patient limited by pain  Discharge Recommendations: Patient would benefit from continued therapy after discharge    Goals  Patient Goals   Patient Goals : To get better  Short Term Goals  Time Frame for Short Term Goals: 12 visits  Short Term Goal 1: Pt to demonstrate bed mobility SBA  Short Term Goal 2: Pt to perform STS transfers CGA  Short Term Goal 3: Pt to ambulate at least 50ft w/ RW independently  Short Term Goal 4: Pt to actively participate in at least 30 minutes of physical therapy for ther act, ther ex, balance, gait, and endurance training  Short Term Goal 5: Pt to be indep w/ pressure relief techniques in order to maintain skin integrity and prevent pressure injuries    PLAN OF CARE/SAFETY  Physical Therapy Plan  General Plan: 5-7 times per week  Specific Instructions for Next Treatment: Trial Quad cane and Sinan walker for better control in ambulation. Current Treatment Recommendations: Strengthening;ROM;Balance training;Functional mobility training;Transfer training;Gait training;Stair training;Neuromuscular re-education;Home exercise program;Endurance training;Pain management; Safety education & training;Patient/Caregiver education & training;Equipment evaluation,

## 2023-12-26 NOTE — PROGRESS NOTES
Occupational Therapy  Facility/Department: [facility]  Rehabilitation Occupational Therapy Daily Treatment Note    Date: 23  Patient Name: Stiven Fernando       Room: 2296/8097-72  MRN: [id number]  Account: [de-identified]   : 1945  (74 y.o.) Gender: male      RN Ovidio Garcia reports patient is medically stable for therapy treatment this date. Chart reviewed prior to treatment and patient is agreeable for therapy. All lines intact and patient positioned comfortably at end of treatment. All patient needs addressed prior to ending therapy session. Pt currently functioning below baseline. Recommend daily inpatient skilled therapy at time of discharge to maximize long term outcomes and prevent re-admission. Please refer to AM-PAC score for current level of function. Past Medical History:  has a past medical history of A-fib ([address]), Anxiety, Arrhythmia, Depression, Hyperlipidemia, Hypertension, and Obesity. Past Surgical History:   has a past surgical history that includes joint replacement (Left); Tonsillectomy; and Atrial ablation surgery (). Restrictions  Restrictions/Precautions: General Precautions, Fall Risk  Other position/activity restrictions: Up w/ assist, LUE IV, telemetry, ext cath, recent R shoulder injury s/p fall- per most recent f/u with ortho, pt is able to complete gentle AA/AROM to shoulder and use arm for simple ADLs. Required Braces or Orthoses?: No    Subjective  Subjective: Pt resting in bed upon arrival agreeable to treatment. Restrictions/Precautions: General Precautions; Fall Risk             Objective     Cognition  Overall Cognitive Status: Exceptions  Arousal/Alertness: Appropriate responses to stimuli  Following Commands: Follows one step commands with repetition; Follows one step commands with increased time  Attention Span: Difficulty attending to directions; Attends with cues to redirect  Memory: Decreased long term memory;Decreased short term

## 2023-12-26 NOTE — PROGRESS NOTES
Transitions of Care Pharmacy Service   Medication Review    The patient's list of current home medications has been reviewed. Source(s) of information: Med list from [employer]    Based on information provided by the above source(s), I have updated the patient's home med list as described below. Please review the ACTION REQUESTED section of this note below for any discrepancies on current hospital orders. I changed or updated the following medications on the patient's home medication list:  Removed Magnesium 400mg cap     Added Robitussin-DM  Duoneb  Lidocaine 4% Patch  Saline Nasal Spray     Adjusted   Tylenol - for pain/fever  Midodrine - hold for SBP > 140  Senna S - q12h prn  Torsemide 40mg qd   Other Notes None                 Please feel free to call me with any questions about this encounter. Thank you. Rashmi William, [facility]   Transitions of Care Pharmacy Service  Phone:  [phone]  Fax: [phone]      Electronically signed by Rashmi William [facility] on 12/26/2023 at 2:06 PM           Medications Prior to Admission:   guaiFENesin-dextromethorphan (ROBITUSSIN DM) 100-10 MG/5ML syrup, Take 10 mLs by mouth 4 times daily as needed for Cough  ipratropium 0.5 mg-albuterol 2.5 mg (DUONEB) 0.5-2.5 (3) MG/3ML SOLN nebulizer solution, Inhale 3 mLs into the lungs every 4 hours as needed for Shortness of Breath or Wheezing  lidocaine (HM LIDOCAINE PATCH) 4 % external patch, Place 1 patch onto the skin 2 times daily Apply to right shoulder topically two times daily for pain  sodium chloride (OCEAN, BABY AYR) 0.65 % nasal spray, 1 spray by Nasal route as needed for Congestion 1 spray in each nostril every 2 hours as needed for congestion.   acetaminophen (TYLENOL) 500 MG tablet, Take 1 tablet by mouth every 6 hours as needed for Pain or Fever  ferrous sulfate (IRON 325) 325 (65 Fe) MG tablet, Take 1 tablet by mouth daily (with breakfast)  tamsulosin (FLOMAX) 0.4 MG capsule, Take 1 capsule by mouth daily  pantoprazole (PROTONIX) 40 MG tablet, Take 1 tablet by mouth daily  senna-docusate (PERICOLACE) 8.6-50 MG per tablet, Take 1 tablet by mouth 2 times daily as needed  Torsemide 40 MG TABS, Take 1 tablet by mouth daily  midodrine (PROAMATINE) 5 MG tablet, Take 1 tablet by mouth 3 times daily (with meals) (Patient taking differently: Take 1 tablet by mouth 3 times daily (with meals) Hold for SBP more than 140)  melatonin 3 MG TABS tablet, Take 1 tablet by mouth nightly as needed (sleep)  vitamin D (CHOLECALCIFEROL) 125 MCG (5000 UT) CAPS capsule, Take 1 capsule by mouth daily  metoprolol succinate (TOPROL XL) 100 MG extended release tablet, Take 1 tablet by mouth nightly  folic acid (FOLVITE) 1 MG tablet, Take 1 tablet by mouth daily  fenofibrate (TRIGLIDE) 160 MG tablet, Take 1 tablet by mouth daily  amLODIPine (NORVASC) 5 MG tablet, Take 1 tablet by mouth daily  rosuvastatin (CRESTOR) 20 MG tablet, Take 1 tablet by mouth daily  magnesium oxide (MAG-OX) 400 MG tablet, Take 1 tablet by mouth daily  levothyroxine (SYNTHROID) 150 MCG tablet, Take 1 tablet by mouth Daily  apixaban (ELIQUIS) 5 MG TABS tablet, Take 1 tablet by mouth 2 times daily Take 1 tablet by mouth in the morning and 1 tablet before bedtime.  vitamin B-12 (CYANOCOBALAMIN) 500 MCG tablet, Take 1 tablet by mouth daily Take 1 tablet by mouth in the morning.

## 2023-12-26 NOTE — PLAN OF CARE
Problem: Chronic Conditions and Co-morbidities  Goal: Patient's chronic conditions and co-morbidity symptoms are monitored and maintained or improved  Outcome: Progressing     Problem: Discharge Planning  Goal: Discharge to home or other facility with appropriate resources  Outcome: Progressing     Problem: Safety - Adult  Goal: Free from fall injury  Outcome: Progressing     Problem: Skin/Tissue Integrity  Goal: Absence of new skin breakdown  Description: 1. Monitor for areas of redness and/or skin breakdown  2. Assess vascular access sites hourly  3. Every 4-6 hours minimum:  Change oxygen saturation probe site  4. Every 4-6 hours:  If on nasal continuous positive airway pressure, respiratory therapy assess nares and determine need for appliance change or resting period.   Outcome: Progressing     Problem: ABCDS Injury Assessment  Goal: Absence of physical injury  Outcome: Progressing     Problem: Respiratory - Adult  Goal: Achieves optimal ventilation and oxygenation  Outcome: Progressing     Problem: Cardiovascular - Adult  Goal: Maintains optimal cardiac output and hemodynamic stability  Outcome: Progressing  Goal: Absence of cardiac dysrhythmias or at baseline  Outcome: Progressing     Problem: Skin/Tissue Integrity - Adult  Goal: Skin integrity remains intact  Outcome: Progressing  Goal: Incisions, wounds, or drain sites healing without S/S of infection  Outcome: Progressing  Goal: Oral mucous membranes remain intact  Outcome: Progressing     Problem: Infection - Adult  Goal: Absence of infection at discharge  Outcome: Progressing  Goal: Absence of infection during hospitalization  Outcome: Progressing  Goal: Absence of fever/infection during anticipated neutropenic period  Outcome: Progressing     Problem: Hematologic - Adult  Goal: Maintains hematologic stability  Outcome: Progressing     Problem: Pain  Goal: Verbalizes/displays adequate comfort level or baseline comfort level  Outcome: Progressing

## 2023-12-26 NOTE — PROGRESS NOTES
Pulmonary Critical Care Progress Note    Patient seen for the follow up of Acute on chronic heart failure with preserved ejection fraction (HCC)     Subjective:      He has been on room air.  No shortness of breath at rest.  He has no chest pain.  He has occasional dry cough.  He complains of persistent leg edema.IR attempted thoracentesis not enough fluid      Examination:    Vitals: BP (!) 144/65   Pulse 73   Temp 97.5 °F (36.4 °C) (Temporal)   Resp 20   Ht 1.803 m (5' 11\")   Wt 120.4 kg (265 lb 6.4 oz)   SpO2 93%   BMI 37.02 kg/m²   SpO2  Av.5 %  Min: 91 %  Max: 97 %  General appearance: alert and cooperative with exam  Neck: No JVD  Lungs: Decreased breath sound more on left side mild crackles  Heart: regular rate and rhythm, S1, S2 normal, no gallop  Abdomen: Soft, non tender, + BS  Extremities: no cyanosis or clubbing. significant edema  3+ chronic venous insufficiency    LABs:    CBC:   No results for input(s): \"WBC\", \"HGB\", \"HCT\", \"PLT\" in the last 72 hours.    BMP:   Recent Labs     23  0556 23  0545    135   K 4.1 3.8   CO2 25 25   BUN 43* 39*   CREATININE 1.6* 1.8*   LABGLOM 44* 38*   GLUCOSE 94 83          Latest Reference Range & Units 23 06:06 23 05:45   Procalcitonin <0.09 ng/mL 0.07 0.11 (H)   (H): Data is abnormally high  Radiology:  CXR   No change     Chest x-ray   Spectrum of findings may favor CHF and worsening pulmonary edema.          Impression/recommendation;      Left recurrent pleural effusion, s/p Pleurx catheter from  till  removed for nonfunctioning/atelectasis  Not enough fluid for left thoracentesis; suspect due to loculation     Pneumonia  Zithromax  IV rocephin  Check CBC    Suspected sleep apnea/obesity   Outpatient sleep evaluation    CKD  Congestive heart failure   Cardiology on consult  IV lasix 40 mg BID    Discussed with RN   Discussed with daughter again today at bedside  PTOT  DVT prophylaxis, on eliquis      Cayden  Cory Caldwell MD, MD, [facility]  Pulmonary Critical Care and Sleep Medicine,  [facility]  Cell: [phone]  Office: [phone]

## 2023-12-27 VITALS
WEIGHT: 262.2 LBS | OXYGEN SATURATION: 99 % | HEART RATE: 68 BPM | TEMPERATURE: 97.7 F | RESPIRATION RATE: 18 BRPM | SYSTOLIC BLOOD PRESSURE: 130 MMHG | BODY MASS INDEX: 36.71 KG/M2 | HEIGHT: 71 IN | DIASTOLIC BLOOD PRESSURE: 69 MMHG

## 2023-12-27 LAB
ANION GAP SERPL CALCULATED.3IONS-SCNC: 11 MMOL/L (ref 9–17)
BASOPHILS # BLD: 0.04 K/UL (ref 0–0.2)
BASOPHILS NFR BLD: 0 % (ref 0–2)
BNP SERPL-MCNC: 6839 PG/ML
BUN SERPL-MCNC: 35 MG/DL (ref 8–23)
BUN/CREAT SERPL: 22 (ref 9–20)
CALCIUM SERPL-MCNC: 9 MG/DL (ref 8.6–10.4)
CHLORIDE SERPL-SCNC: 99 MMOL/L (ref 98–107)
CO2 SERPL-SCNC: 26 MMOL/L (ref 20–31)
CREAT SERPL-MCNC: 1.6 MG/DL (ref 0.7–1.2)
EKG ATRIAL RATE: 56 BPM
EKG P AXIS: 56 DEGREES
EKG P-R INTERVAL: 236 MS
EKG Q-T INTERVAL: 512 MS
EKG QRS DURATION: 142 MS
EKG QTC CALCULATION (BAZETT): 494 MS
EKG R AXIS: 95 DEGREES
EKG T AXIS: 5 DEGREES
EKG VENTRICULAR RATE: 56 BPM
EOSINOPHIL # BLD: 0.11 K/UL (ref 0–0.44)
EOSINOPHILS RELATIVE PERCENT: 1 % (ref 1–4)
ERYTHROCYTE [DISTWIDTH] IN BLOOD BY AUTOMATED COUNT: 16.1 % (ref 11.8–14.4)
GFR SERPL CREATININE-BSD FRML MDRD: 44 ML/MIN/1.73M2
GLUCOSE SERPL-MCNC: 102 MG/DL (ref 70–99)
HCT VFR BLD AUTO: 28.6 % (ref 40.7–50.3)
HGB BLD-MCNC: 8.6 G/DL (ref 13–17)
IMM GRANULOCYTES # BLD AUTO: 0.03 K/UL (ref 0–0.3)
IMM GRANULOCYTES NFR BLD: 0 %
LYMPHOCYTES NFR BLD: 1.68 K/UL (ref 1.1–3.7)
LYMPHOCYTES RELATIVE PERCENT: 18 % (ref 24–43)
MAGNESIUM SERPL-MCNC: 1.8 MG/DL (ref 1.6–2.6)
MCH RBC QN AUTO: 32.1 PG (ref 25.2–33.5)
MCHC RBC AUTO-ENTMCNC: 30.1 G/DL (ref 28.4–34.8)
MCV RBC AUTO: 106.7 FL (ref 82.6–102.9)
MONOCYTES NFR BLD: 0.91 K/UL (ref 0.1–1.2)
MONOCYTES NFR BLD: 10 % (ref 3–12)
NEUTROPHILS NFR BLD: 71 % (ref 36–65)
NEUTS SEG NFR BLD: 6.36 K/UL (ref 1.5–8.1)
NRBC BLD-RTO: 0 PER 100 WBC
PLATELET # BLD AUTO: 367 K/UL (ref 138–453)
PMV BLD AUTO: 10.2 FL (ref 8.1–13.5)
POTASSIUM SERPL-SCNC: 3.5 MMOL/L (ref 3.7–5.3)
POTASSIUM SERPL-SCNC: 4.1 MMOL/L (ref 3.7–5.3)
RBC # BLD AUTO: 2.68 M/UL (ref 4.21–5.77)
RBC # BLD: ABNORMAL 10*6/UL
RBC # BLD: ABNORMAL 10*6/UL
SODIUM SERPL-SCNC: 136 MMOL/L (ref 135–144)
WBC OTHER # BLD: 9.1 K/UL (ref 3.5–11.3)

## 2023-12-27 PROCEDURE — 85025 COMPLETE CBC W/AUTO DIFF WBC: CPT

## 2023-12-27 PROCEDURE — 97530 THERAPEUTIC ACTIVITIES: CPT

## 2023-12-27 PROCEDURE — 99239 HOSP IP/OBS DSCHRG MGMT >30: CPT | Performed by: INTERNAL MEDICINE

## 2023-12-27 PROCEDURE — 2580000003 HC RX 258: Performed by: STUDENT IN AN ORGANIZED HEALTH CARE EDUCATION/TRAINING PROGRAM

## 2023-12-27 PROCEDURE — 6370000000 HC RX 637 (ALT 250 FOR IP): Performed by: NURSE PRACTITIONER

## 2023-12-27 PROCEDURE — 6360000002 HC RX W HCPCS: Performed by: STUDENT IN AN ORGANIZED HEALTH CARE EDUCATION/TRAINING PROGRAM

## 2023-12-27 PROCEDURE — 83880 ASSAY OF NATRIURETIC PEPTIDE: CPT

## 2023-12-27 PROCEDURE — 84132 ASSAY OF SERUM POTASSIUM: CPT

## 2023-12-27 PROCEDURE — 6370000000 HC RX 637 (ALT 250 FOR IP): Performed by: INTERNAL MEDICINE

## 2023-12-27 PROCEDURE — 80048 BASIC METABOLIC PNL TOTAL CA: CPT

## 2023-12-27 PROCEDURE — 6370000000 HC RX 637 (ALT 250 FOR IP): Performed by: STUDENT IN AN ORGANIZED HEALTH CARE EDUCATION/TRAINING PROGRAM

## 2023-12-27 PROCEDURE — 2580000003 HC RX 258: Performed by: INTERNAL MEDICINE

## 2023-12-27 PROCEDURE — 36415 COLL VENOUS BLD VENIPUNCTURE: CPT

## 2023-12-27 PROCEDURE — 83735 ASSAY OF MAGNESIUM: CPT

## 2023-12-27 PROCEDURE — 97535 SELF CARE MNGMENT TRAINING: CPT

## 2023-12-27 RX ORDER — FUROSEMIDE 40 MG/1
40 TABLET ORAL 2 TIMES DAILY
Qty: 60 TABLET | Refills: 3 | DISCHARGE
Start: 2023-12-27

## 2023-12-27 RX ORDER — FUROSEMIDE 40 MG/1
40 TABLET ORAL 2 TIMES DAILY
Status: DISCONTINUED | OUTPATIENT
Start: 2023-12-27 | End: 2023-12-27 | Stop reason: HOSPADM

## 2023-12-27 RX ADMIN — CHOLECALCIFEROL TAB 125 MCG (5000 UNIT) 5000 UNITS: 125 TAB at 10:13

## 2023-12-27 RX ADMIN — LEVOTHYROXINE SODIUM 150 MCG: 0.07 TABLET ORAL at 06:06

## 2023-12-27 RX ADMIN — ROSUVASTATIN CALCIUM 20 MG: 10 TABLET, FILM COATED ORAL at 10:36

## 2023-12-27 RX ADMIN — MIDODRINE HYDROCHLORIDE 5 MG: 5 TABLET ORAL at 12:44

## 2023-12-27 RX ADMIN — FUROSEMIDE 60 MG: 10 INJECTION, SOLUTION INTRAMUSCULAR; INTRAVENOUS at 10:13

## 2023-12-27 RX ADMIN — PANTOPRAZOLE SODIUM 40 MG: 40 TABLET, DELAYED RELEASE ORAL at 06:06

## 2023-12-27 RX ADMIN — CEFTRIAXONE SODIUM 1000 MG: 1 INJECTION, POWDER, FOR SOLUTION INTRAMUSCULAR; INTRAVENOUS at 00:49

## 2023-12-27 RX ADMIN — FERROUS SULFATE TAB EC 325 MG (65 MG FE EQUIVALENT) 325 MG: 325 (65 FE) TABLET DELAYED RESPONSE at 10:12

## 2023-12-27 RX ADMIN — POTASSIUM CHLORIDE 40 MEQ: 1500 TABLET, EXTENDED RELEASE ORAL at 06:05

## 2023-12-27 RX ADMIN — Medication 400 MG: at 10:12

## 2023-12-27 RX ADMIN — FUROSEMIDE 40 MG: 40 TABLET ORAL at 17:36

## 2023-12-27 RX ADMIN — FENOFIBRATE 160 MG: 160 TABLET ORAL at 10:12

## 2023-12-27 RX ADMIN — FOLIC ACID 1 MG: 1 TABLET ORAL at 10:12

## 2023-12-27 RX ADMIN — SODIUM CHLORIDE, PRESERVATIVE FREE 10 ML: 5 INJECTION INTRAVENOUS at 10:13

## 2023-12-27 RX ADMIN — SODIUM CHLORIDE: 9 INJECTION, SOLUTION INTRAVENOUS at 00:48

## 2023-12-27 RX ADMIN — AZITHROMYCIN DIHYDRATE 500 MG: 250 TABLET ORAL at 10:12

## 2023-12-27 RX ADMIN — APIXABAN 5 MG: 5 TABLET, FILM COATED ORAL at 10:12

## 2023-12-27 RX ADMIN — TAMSULOSIN HYDROCHLORIDE 0.4 MG: 0.4 CAPSULE ORAL at 10:13

## 2023-12-27 NOTE — DISCHARGE SUMMARY
[facility]  Office: 7900  1826, DO, Aiden Kamara, DO, Montie Cabot, DO, Hernan Martinez, DO, Lauro Frost MD, Cesar Zhang MD, Karly Sosa MD, Tali Leung MD,  Levar Colon MD, Geneva Werner MD, Maxine Gary MD,  Paris Galvez MD, Marina Méndez MD, Gavi Sarah DO, Chad Gonzalez MD,  Connor Vázquez, DO, Robbie Rondon MD, Bev Phillips MD, Stanton Hammond MD, Leon Pulido MD,  Elisa Mckeon MD, Nora Cesar MD, Shraddha Soares MD, Valerie Youngblood MD, Radu Díaz MD, Adrian Andujar MD, Linda Gutierrez DO, Sharan Prescott DO, Yelena Bennett MD,  Devin Perez MD, Nubia Leyva, CNP,  Nando Conde, CNP, Sandra Cardenas, CNP,  Michael Samuels, [facility], Caesar Fortallen, CNP, Kristie Mainamalia, CNP, Dao Muir, CNP, Mykel Juares, CNP, Luis Castillo, CNP, Jenna Rushing PAArshC, ALLAN RobbinsC, Messi Mcbride, CNP, Agustina Batista, CNS, Ezequiel Esparza, CNP, Cindy Tyson, CNP, [facility], [address] [facility]    Discharge Summary     Patient ID: Paul Leslie  :  1945   MRN: [id number]     ACCOUNT:  [de-identified]   Patient's PCP: ANDERSON Arambula CNP  Admit Date: 2023   Discharge Date: 2023     Length of Stay: 3  Code Status:  DNR-CCA  Admitting Physician: Kanika Martinez DO  Discharge Physician: Kanika Martinez DO     Active Discharge Diagnoses:     Hospital Problem Lists:  Principal Problem:    Acute on chronic heart failure with preserved ejection fraction [facility]  Active Problems:    Essential hypertension    PAF (paroxysmal atrial fibrillation) (HCC)    Pleural effusion on left  Resolved Problems:    * No resolved hospital problems.  *      Admission Condition:  poor     Discharged Condition: stable    Hospital Stay:     Hospital Course:  Paul Leslie is a 66 y.o. male who was admitted for the management of  Acute on chronic heart failure with Date: 12/23/2023  Slight improvement but significant left inferior hemithoracic opacity in left perihilar opacity remains with left effusion.  Vascularity appears prominent.     XR CHEST 1 VIEW    Result Date: 12/21/2023  *Unchanged small left, trace right pleural effusion. *Interval removal of tunneled pleural catheter. Approved by:Chao Green12/21/2023 2:38 PM. I, Gabo Oleary,have reviewed the image(s) and agree with the findings in this report. Electronically signed: Gabo Oleary.      Consultations:    Consults:     Final Specialist Recommendations/Findings:   IP CONSULT TO HEART FAILURE NURSE/COORDINATOR  IP CONSULT TO DIETITIAN  IP CONSULT TO CARDIOLOGY  IP CONSULT TO PULMONOLOGY      The patient was seen and examined on day of discharge and this discharge summary is in conjunction with any daily progress note from day of discharge.    Discharge plan:     Disposition: snf    Physician Follow Up:     Zuleika Arreola, APRN - CNP  [address] [facility] [address] [facility] [address] [place], [place] [id number] [phone]    Follow up in 3 week(s)      [facility]    Follow up in 4 week(s)         Requiring Further Evaluation/Follow Up POST HOSPITALIZATION/Incidental Findings: monitor renal function    Diet: cardiac diet    Activity: As tolerated    Instructions to Patient: take medications as prescribed      Discharge Medications:      Medication List        START taking these medications      furosemide 40 MG tablet  Commonly known as: LASIX  Take 1 tablet by mouth in the morning and 1 tablet in the evening.            CONTINUE taking these medications      acetaminophen 500 MG tablet  Commonly known as: TYLENOL     apixaban 5 MG Tabs tablet  Commonly known as: Eliquis  Take 1 tablet by mouth 2 times daily Take 1 tablet by mouth in the morning and 1 tablet before bedtime.     fenofibrate 160 MG tablet  Commonly known as: TRIGLIDE  Take 1 tablet by mouth daily     ferrous sulfate 325 (65 Fe) MG tablet  Commonly known as: IRON

## 2023-12-27 NOTE — PROGRESS NOTES
Physician Progress Note      PATIENT:               Evelio Carlos  CSN #:                  [id number]  :                       1945  ADMIT DATE:       2023 12:25 AM  DISCH DATE:  Aric Morrison  PROVIDER #:        Pilo Martinez DO          QUERY TEXT:    Patient admitted with acute on chronic combined CHF, noted to have paroxysmal   atrial fibrillation and is maintained on Eliquis. If possible, please document   in progress notes and discharge summary if you are evaluating and/or treating   any of the following: The medical record reflects the following:  Risk Factors: paroxysmal A fib, Eliquis  Clinical Indicators: On Eliquis for a fib. History of DC cardioversion with   amiodarone 2021 Amiodarone discontinued due to thyroid toxicity. Treatment: continue home Eliquis dosing 5 mg twice daily. Thanks, Lluvia Cork  Options provided:  -- Secondary hypercoagulable state in a patient with atrial fibrillation  -- Other - I will add my own diagnosis  -- Disagree - Not applicable / Not valid  -- Disagree - Clinically unable to determine / Unknown  -- Refer to Clinical Documentation Reviewer    PROVIDER RESPONSE TEXT:    This patient has secondary hypercoagulable state in a patient with atrial   fibrillation.     Query created by: Onur Dunne on 2023 12:00 PM      Electronically signed by:  Pilo Martinez DO 2023 3:14 PM

## 2023-12-27 NOTE — PROGRESS NOTES
Nutrition Note    Positive nutrition screen received for 34 lb or more weight loss. Patient reports significant weight gain from congestive heart failure. Patient was started on lasix and lost a considerable amount of water weight. Patient is not at risk for malnutrition. P.O intakes are adequate. Patient is expected to discharge to [facility] today.         Zaina GERMAN, RDN, LDN  Lead Clinical Dietitian  RD Office Phone [phone]

## 2023-12-27 NOTE — PROGRESS NOTES
possible pneumonia  Monitor renal function; baseline cr probably 1.1  Dc back to snf today    Krunal BAILEY Blood, DO  12/27/2023  12:17 PM

## 2023-12-27 NOTE — PLAN OF CARE
Problem: Chronic Conditions and Co-morbidities  Goal: Patient's chronic conditions and co-morbidity symptoms are monitored and maintained or improved  12/27/2023 0029 by Julia Coto RN  Outcome: Progressing  12/26/2023 1433 by Khris Simpson RN  Outcome: Progressing     Problem: Discharge Planning  Goal: Discharge to home or other facility with appropriate resources  12/27/2023 0029 by Julia Coto RN  Outcome: Progressing  12/26/2023 1433 by Khris Simpson RN  Outcome: Progressing     Problem: Safety - Adult  Goal: Free from fall injury  12/27/2023 0029 by Julia Coto RN  Outcome: Progressing  12/26/2023 1433 by Khris Simpson RN  Outcome: Progressing     Problem: Skin/Tissue Integrity  Goal: Absence of new skin breakdown  Description: 1. Monitor for areas of redness and/or skin breakdown  2. Assess vascular access sites hourly  3. Every 4-6 hours minimum:  Change oxygen saturation probe site  4. Every 4-6 hours:  If on nasal continuous positive airway pressure, respiratory therapy assess nares and determine need for appliance change or resting period.   12/27/2023 0029 by Julia Coto RN  Outcome: Progressing  12/26/2023 1433 by Khris Simpson RN  Outcome: Progressing     Problem: ABCDS Injury Assessment  Goal: Absence of physical injury  12/27/2023 0029 by Julia Coto RN  Outcome: Progressing  12/26/2023 1433 by Khris Simpson RN  Outcome: Progressing     Problem: Respiratory - Adult  Goal: Achieves optimal ventilation and oxygenation  12/27/2023 0029 by Julia Coto RN  Outcome: Progressing  12/26/2023 1433 by Khris Simpson RN  Outcome: Progressing     Problem: Cardiovascular - Adult  Goal: Maintains optimal cardiac output and hemodynamic stability  12/27/2023 0029 by Julia Coto RN  Outcome: Progressing  12/26/2023 1433 by Khris Simpson RN  Outcome: Progressing  Goal: Absence of cardiac dysrhythmias or at baseline  12/27/2023 0029 by Joseph Hollins

## 2023-12-27 NOTE — PLAN OF CARE
Problem: Chronic Conditions and Co-morbidities  Goal: Patient's chronic conditions and co-morbidity symptoms are monitored and maintained or improved  12/27/2023 1247 by Brianna Lange RN  Outcome: Progressing  12/27/2023 0029 by Jagruti Yousif RN  Outcome: Progressing     Problem: Discharge Planning  Goal: Discharge to home or other facility with appropriate resources  12/27/2023 1247 by Brianna Lange RN  Outcome: Progressing  12/27/2023 0029 by Jagruti Yousif RN  Outcome: Progressing     Problem: Safety - Adult  Goal: Free from fall injury  12/27/2023 1247 by Brianna Lange RN  Outcome: Progressing  12/27/2023 0029 by Jagruti Yousif RN  Outcome: Progressing     Problem: Skin/Tissue Integrity  Goal: Absence of new skin breakdown  Description: 1. Monitor for areas of redness and/or skin breakdown  2. Assess vascular access sites hourly  3. Every 4-6 hours minimum:  Change oxygen saturation probe site  4. Every 4-6 hours:  If on nasal continuous positive airway pressure, respiratory therapy assess nares and determine need for appliance change or resting period.   12/27/2023 1247 by Brianna Lange RN  Outcome: Progressing  12/27/2023 0029 by Jagruti Yousif RN  Outcome: Progressing     Problem: ABCDS Injury Assessment  Goal: Absence of physical injury  12/27/2023 1247 by Biranna Lange RN  Outcome: Progressing  12/27/2023 0029 by Jagruti Yousif RN  Outcome: Progressing     Problem: Respiratory - Adult  Goal: Achieves optimal ventilation and oxygenation  12/27/2023 1247 by Brianna Lange RN  Outcome: Progressing  12/27/2023 0029 by Jagruti Yousif RN  Outcome: Progressing     Problem: Cardiovascular - Adult  Goal: Maintains optimal cardiac output and hemodynamic stability  12/27/2023 1247 by Brianna Lange RN  Outcome: Progressing  12/27/2023 0029 by Jagruti Yousif RN  Outcome: Progressing  Goal: Absence of cardiac dysrhythmias or at baseline  12/27/2023 1247 by Brianna Lange

## 2023-12-27 NOTE — PROGRESS NOTES
Occupational Therapy  Facility/Department: [facility]  Daily Treatment Note  NAME: Jareth Paulino  : 1945  MRN: [id number]    Date of Service: 2023    Discharge Recommendations:  Patient would benefit from continued therapy after discharge     Upon writer exit, call light within reach, pt retired to chair. All lines intact and patient positioned comfortably. All patient needs addressed prior to ending therapy session. Chart reviewed prior to treatment and patient is agreeable for therapy.  RN reports patient is medically stable for therapy treatment this date.    Patient Diagnosis(es): There were no encounter diagnoses.     Assessment    Activity Tolerance: Patient limited by endurance;Patient limited by pain  Discharge Recommendations: Patient would benefit from continued therapy after discharge      Plan   Occupational Therapy Plan  Times Per Week: 4-5x/week  Current Treatment Recommendations: Strengthening;Balance training;Functional mobility training;Self-Care / ADL;Safety education & training;Endurance training;Patient/Caregiver education & training;Equipment evaluation, education, & procurement;Positioning;Cognitive/Perceptual training     Restrictions   Restrictions/Precautions  Restrictions/Precautions: General Precautions, Fall Risk  Required Braces or Orthoses?: No  Position Activity Restriction  Other position/activity restrictions: Up w/ assist, LUE IV, telemetry, ext cath, recent R shoulder injury s/p fall- per most recent f/u with ortho, pt is able to complete gentle AA/AROM to shoulder and use arm for simple ADLs.    Subjective   Subjective  Subjective: \"Everyone has their own idea on how to help me.\"  Orientation  Overall Orientation Status: Within Functional Limits  Cognition  Overall Cognitive Status: Exceptions  Arousal/Alertness: Appropriate responses to stimuli  Following Commands: Follows one step commands with repetition;Follows one step commands with increased time  Attention Span:  Attends with cues to redirect  Memory: Decreased long term memory;Decreased short term memory;Decreased recall of recent events;Decreased recall of precautions  Safety Judgement: Decreased awareness of need for safety;Decreased awareness of need for assistance  Problem Solving: Decreased awareness of errors;Assistance required to identify errors made;Assistance required to correct errors made;Assistance required to implement solutions;Assistance required to generate solutions  Insights: Decreased awareness of deficits  Initiation: Requires cues for some  Sequencing: Requires cues for some        Objective      Bed Mobility Training  Bed Mobility Training: Yes  Overall Level of Assistance: Moderate assistance  Interventions: Manual cues; Safety awareness training;Verbal cues  Rolling: Moderate assistance  Supine to Sit: Moderate assistance    Balance  Sitting:  (SBA)  Standing:  (CGA with krista walker)    Transfer Training  Transfer Training: Yes  Overall Level of Assistance: Minimum assistance;Contact-guard assistance  Interventions: Safety awareness training  Sit to Stand: Minimum assistance;Contact-guard assistance  Stand to Sit: Contact-guard assistance;Minimum assistance  Bed to Chair: Minimum assistance;Contact-guard assistance    Gait  Overall Level of Assistance: Minimum assistance  Assistive Device: Gait belt;Walker krista  Interventions: Safety awareness training;Verbal cues     Functional Mobility: Minimal assistance  Functional Mobility Skilled Clinical Factors: Patient completed functional mobility around the room with krista walker after visual demonstration. Patient demonstrated fair return however required cues for safety with keeping the krista walker within base of support. Additional Comments: ADLs limited by R shoulder ROM deficits/pain, as well as general deconditioning, generalized weakness, and cognition.  Proivded patient with different activities to include RUE to e encourage ROM        Safety

## 2023-12-27 NOTE — CARE COORDINATION
Case Management Assessment  Initial Evaluation    Date/Time of Evaluation: 12/24/2023 12:27 PM  Assessment Completed by: Jenn Madison RN    If patient is discharged prior to next notation, then this note serves as note for discharge by case management. Patient Name: Ritchie Polo                   YOB: 1945  Diagnosis: Acute congestive heart failure, unspecified heart failure type [facility]) [I50.9]                   Date / Time: 12/24/2023 12:25 AM    Patient Admission Status: Inpatient   Readmission Risk (Low < 19, Mod (19-27), High > 27): Readmission Risk Score: 22.3    Current PCP: ANDERSON Goncalves CNP  PCP verified by CM? Yes    Chart Reviewed: Yes      History Provided by: Patient  Patient Orientation: Alert and Oriented, Person, Place, Situation, Self    Patient Cognition: Alert    Hospitalization in the last 30 days (Readmission):  No    If yes, Readmission Assessment in CM Navigator will be completed. Advance Directives:      Code Status: DNR-CCA   Patient's Primary Decision Maker is: Legal Next of Kin    Primary Decision MakerDmichoacano Johnson Child - [phone]    Discharge Planning:    Patient lives with: Alone Type of Home: House  Primary Care Giver: Other (Comment) (from gladys SANCHEZ)  Patient Support Systems include: Children   Current Financial resources: None  Current community resources: None  Current services prior to admission: Durable Medical Equipment            Current DME: Cane, Shower Chair (RTS)            Type of Home Care services:  None    ADLS  Prior functional level: Assistance with the following:, Bathing, Toileting, Cooking, Housework, Shopping, Mobility  Current functional level: Assistance with the following:, Bathing, Toileting, Cooking, Housework, Dressing, Shopping, Mobility    PT AM-PAC: 12 /24  OT AM-PAC: 11 /24    Family can provide assistance at DC:  Yes  Would you like Case Management to discuss the discharge plan with any other family
Discharge planning    Patient being discharged and returning back to [facility]. Transportation at 1800.
IMM letter provided to patient. Patient offered four hours to make informed decision regarding appeal process; patient agreeable to discharge.
Social work: Patient to [employer] to [place] via [address]  at [facility] [employer].  # for RN report: [phone]. Completed DAGOBERTO faxed to [phone]. Informed RN, pt, and facility of dc time, agreeable to plan.
Social work: spoke to Lindy Prince at [facility]. Pt is ok to return there when ready if care needs can be met. Phone: [phone]  Fax [phone]  Faxed and called no precert is needed. They are planning on pt returning. Call when ready and fax randal and Rx at discharge. They were sent updates today.    Report at the building once they approve the return is [address] [facility],[address] Report  Marianna castañeda
DC instructions

## 2024-01-02 ENCOUNTER — HOSPITAL ENCOUNTER (OUTPATIENT)
Age: 79
Setting detail: SPECIMEN
Discharge: HOME | End: 2024-01-02

## 2024-01-02 PROCEDURE — 36415 COLL VENOUS BLD VENIPUNCTURE: CPT

## 2024-01-02 PROCEDURE — 80048 BASIC METABOLIC PNL TOTAL CA: CPT

## 2024-01-02 PROCEDURE — 85027 COMPLETE CBC AUTOMATED: CPT

## 2024-01-02 PROCEDURE — 83880 ASSAY OF NATRIURETIC PEPTIDE: CPT

## 2024-01-03 LAB
ANION GAP SERPL CALCULATED.3IONS-SCNC: 8 MMOL/L
BNP SERPL-MCNC: 7922 PG/ML
BUN SERPL-MCNC: 29 MG/DL (ref 8–23)
BUN/CREAT SERPL: 19 (ref 9–20)
CALCIUM SERPL-MCNC: 9.1 MG/DL (ref 8.6–10.4)
CHLORIDE SERPL-SCNC: 98 MMOL/L (ref 98–107)
CO2 SERPL-SCNC: 29 MMOL/L (ref 20–31)
CREAT SERPL-MCNC: 1.5 MG/DL (ref 0.7–1.2)
ERYTHROCYTE [DISTWIDTH] IN BLOOD BY AUTOMATED COUNT: 15.5 % (ref 11.5–14.5)
GFR SERPL CREATININE-BSD FRML MDRD: 47 ML/MIN/1.73M2
GLUCOSE SERPL-MCNC: 91 MG/DL (ref 70–99)
HCT VFR BLD AUTO: 30.3 % (ref 41–53)
HGB BLD-MCNC: 9.3 G/DL (ref 13.5–17.5)
MCH RBC QN AUTO: 32.3 PG (ref 26–34)
MCHC RBC AUTO-ENTMCNC: 30.7 G/DL (ref 31–37)
MCV RBC AUTO: 105.2 FL (ref 80–100)
PLATELET # BLD AUTO: 335 K/UL (ref 130–400)
POTASSIUM SERPL-SCNC: 3.9 MMOL/L (ref 3.7–5.3)
RBC # BLD AUTO: 2.88 M/UL (ref 4.5–5.9)
SODIUM SERPL-SCNC: 135 MMOL/L (ref 135–144)
WBC OTHER # BLD: 6.8 K/UL (ref 3.5–11)

## 2024-01-17 ENCOUNTER — HOSPITAL ENCOUNTER (OUTPATIENT)
Age: 79
Setting detail: SPECIMEN
Discharge: HOME | End: 2024-01-17

## 2024-01-17 ENCOUNTER — OFFICE VISIT (OUTPATIENT)
Dept: ORTHOPEDIC SURGERY | Age: 79
End: 2024-01-17

## 2024-01-17 VITALS — HEIGHT: 71 IN | WEIGHT: 262 LBS | BODY MASS INDEX: 36.68 KG/M2 | RESPIRATION RATE: 14 BRPM

## 2024-01-17 DIAGNOSIS — S42.201D CLOSED FRACTURE OF PROXIMAL END OF RIGHT HUMERUS WITH ROUTINE HEALING, UNSPECIFIED FRACTURE MORPHOLOGY, SUBSEQUENT ENCOUNTER: Primary | ICD-10-CM

## 2024-01-17 LAB
ANION GAP SERPL CALCULATED.3IONS-SCNC: 13 MMOL/L (ref 9–17)
BUN SERPL-MCNC: 48 MG/DL (ref 8–23)
BUN/CREAT SERPL: 28 (ref 9–20)
CALCIUM SERPL-MCNC: 9.3 MG/DL (ref 8.6–10.4)
CHLORIDE SERPL-SCNC: 98 MMOL/L (ref 98–107)
CO2 SERPL-SCNC: 25 MMOL/L (ref 20–31)
CREAT SERPL-MCNC: 1.7 MG/DL (ref 0.7–1.2)
GFR SERPL CREATININE-BSD FRML MDRD: 41 ML/MIN/1.73M2
GLUCOSE SERPL-MCNC: 121 MG/DL (ref 70–99)
HCT VFR BLD AUTO: 29 % (ref 40.7–50.3)
HGB BLD-MCNC: 8.7 G/DL (ref 13–17)
POTASSIUM SERPL-SCNC: 4.4 MMOL/L (ref 3.7–5.3)
SODIUM SERPL-SCNC: 136 MMOL/L (ref 135–144)

## 2024-01-17 PROCEDURE — 80048 BASIC METABOLIC PNL TOTAL CA: CPT

## 2024-01-17 PROCEDURE — 36415 COLL VENOUS BLD VENIPUNCTURE: CPT

## 2024-01-17 PROCEDURE — 99024 POSTOP FOLLOW-UP VISIT: CPT | Performed by: ORTHOPAEDIC SURGERY

## 2024-01-17 PROCEDURE — 85014 HEMATOCRIT: CPT

## 2024-01-17 PROCEDURE — 85018 HEMOGLOBIN: CPT

## 2024-01-17 PROCEDURE — P9603 ONE-WAY ALLOW PRORATED MILES: HCPCS

## 2024-01-17 NOTE — PROGRESS NOTES
to facilitate this.  He may continue to use the arm as he feels comfortable at this point.  I will see him back for reevaluation in 3 months but he may return or call earlier with questions or concerns.

## 2024-01-18 ENCOUNTER — TELEPHONE (OUTPATIENT)
Dept: ORTHOPEDIC SURGERY | Age: 79
End: 2024-01-18

## 2024-01-18 NOTE — TELEPHONE ENCOUNTER
Patient's daughter, Tushar, called stating that the facility that the patient is staying at is seeking clarification regarding PT and WB status of the patient with his affected arm. After a discussion with the daughter I confirmed with her that Dr. Guerrier is alright with proceeding with conservative tx and is understanding that the main goal for the patient at this point is just to be able to push himself out of his chair. Daughter is going to talk with the facility and attempt to explain this to them. I informed daughter that we could fax over 1/17 OV note to the facility if that would be helpful. She will call to provide fax number if that needs to be done. Daughter voiced understanding.

## 2024-01-22 ENCOUNTER — HOSPITAL ENCOUNTER (OUTPATIENT)
Age: 79
Setting detail: SPECIMEN
Discharge: HOME | End: 2024-01-22

## 2024-01-22 LAB
ANION GAP SERPL CALCULATED.3IONS-SCNC: 8 MMOL/L (ref 9–17)
BUN SERPL-MCNC: 50 MG/DL (ref 8–23)
BUN/CREAT SERPL: 31 (ref 9–20)
CALCIUM SERPL-MCNC: 9.4 MG/DL (ref 8.6–10.4)
CHLORIDE SERPL-SCNC: 103 MMOL/L (ref 98–107)
CO2 SERPL-SCNC: 27 MMOL/L (ref 20–31)
CREAT SERPL-MCNC: 1.6 MG/DL (ref 0.7–1.2)
GFR SERPL CREATININE-BSD FRML MDRD: 44 ML/MIN/1.73M2
GLUCOSE SERPL-MCNC: 81 MG/DL (ref 70–99)
POTASSIUM SERPL-SCNC: 4.1 MMOL/L (ref 3.7–5.3)
SODIUM SERPL-SCNC: 138 MMOL/L (ref 135–144)

## 2024-01-22 PROCEDURE — P9603 ONE-WAY ALLOW PRORATED MILES: HCPCS

## 2024-01-22 PROCEDURE — 80048 BASIC METABOLIC PNL TOTAL CA: CPT

## 2024-01-22 PROCEDURE — 36415 COLL VENOUS BLD VENIPUNCTURE: CPT

## 2024-01-24 ENCOUNTER — HOSPITAL ENCOUNTER (OUTPATIENT)
Age: 79
Setting detail: SPECIMEN
Discharge: HOME | End: 2024-01-24

## 2024-01-24 LAB
ANION GAP SERPL CALCULATED.3IONS-SCNC: 12 MMOL/L (ref 9–17)
BUN SERPL-MCNC: 52 MG/DL (ref 8–23)
BUN/CREAT SERPL: 29 (ref 9–20)
CALCIUM SERPL-MCNC: 9.3 MG/DL (ref 8.6–10.4)
CHLORIDE SERPL-SCNC: 102 MMOL/L (ref 98–107)
CO2 SERPL-SCNC: 25 MMOL/L (ref 20–31)
CREAT SERPL-MCNC: 1.8 MG/DL (ref 0.7–1.2)
GFR SERPL CREATININE-BSD FRML MDRD: 38 ML/MIN/1.73M2
GLUCOSE SERPL-MCNC: 94 MG/DL (ref 70–99)
HCT VFR BLD AUTO: 28.5 % (ref 40.7–50.3)
HGB BLD-MCNC: 8.8 G/DL (ref 13–17)
POTASSIUM SERPL-SCNC: 3.9 MMOL/L (ref 3.7–5.3)
SODIUM SERPL-SCNC: 139 MMOL/L (ref 135–144)

## 2024-01-24 PROCEDURE — 85018 HEMOGLOBIN: CPT

## 2024-01-24 PROCEDURE — P9603 ONE-WAY ALLOW PRORATED MILES: HCPCS

## 2024-01-24 PROCEDURE — 80048 BASIC METABOLIC PNL TOTAL CA: CPT

## 2024-01-24 PROCEDURE — 36415 COLL VENOUS BLD VENIPUNCTURE: CPT

## 2024-01-24 PROCEDURE — 85014 HEMATOCRIT: CPT

## 2024-01-29 ENCOUNTER — HOSPITAL ENCOUNTER (OUTPATIENT)
Age: 79
Setting detail: SPECIMEN
Discharge: HOME | End: 2024-01-29

## 2024-01-29 LAB
ANION GAP SERPL CALCULATED.3IONS-SCNC: 8 MMOL/L (ref 9–17)
BUN SERPL-MCNC: 57 MG/DL (ref 8–23)
BUN/CREAT SERPL: 34 (ref 9–20)
CALCIUM SERPL-MCNC: 9.3 MG/DL (ref 8.6–10.4)
CHLORIDE SERPL-SCNC: 98 MMOL/L (ref 98–107)
CO2 SERPL-SCNC: 31 MMOL/L (ref 20–31)
CREAT SERPL-MCNC: 1.7 MG/DL (ref 0.7–1.2)
GFR SERPL CREATININE-BSD FRML MDRD: 41 ML/MIN/1.73M2
GLUCOSE SERPL-MCNC: 92 MG/DL (ref 70–99)
HCT VFR BLD AUTO: 31 % (ref 40.7–50.3)
HGB BLD-MCNC: 9.4 G/DL (ref 13–17)
POTASSIUM SERPL-SCNC: 3.8 MMOL/L (ref 3.7–5.3)
SODIUM SERPL-SCNC: 137 MMOL/L (ref 135–144)

## 2024-01-29 PROCEDURE — 36415 COLL VENOUS BLD VENIPUNCTURE: CPT

## 2024-01-29 PROCEDURE — 85014 HEMATOCRIT: CPT

## 2024-01-29 PROCEDURE — 80048 BASIC METABOLIC PNL TOTAL CA: CPT

## 2024-01-29 PROCEDURE — 85018 HEMOGLOBIN: CPT

## 2024-01-29 PROCEDURE — P9603 ONE-WAY ALLOW PRORATED MILES: HCPCS

## 2024-02-05 ENCOUNTER — HOSPITAL ENCOUNTER (OUTPATIENT)
Age: 79
Setting detail: SPECIMEN
Discharge: HOME | End: 2024-02-05

## 2024-02-05 LAB
ANION GAP SERPL CALCULATED.3IONS-SCNC: 9 MMOL/L (ref 9–17)
BUN SERPL-MCNC: 57 MG/DL (ref 8–23)
BUN/CREAT SERPL: 36 (ref 9–20)
CALCIUM SERPL-MCNC: 9.1 MG/DL (ref 8.6–10.4)
CHLORIDE SERPL-SCNC: 97 MMOL/L (ref 98–107)
CO2 SERPL-SCNC: 27 MMOL/L (ref 20–31)
CREAT SERPL-MCNC: 1.6 MG/DL (ref 0.7–1.2)
GFR SERPL CREATININE-BSD FRML MDRD: 44 ML/MIN/1.73M2
GLUCOSE SERPL-MCNC: 74 MG/DL (ref 70–99)
HCT VFR BLD AUTO: 29.2 % (ref 40.7–50.3)
HGB BLD-MCNC: 9.2 G/DL (ref 13–17)
POTASSIUM SERPL-SCNC: 4.1 MMOL/L (ref 3.7–5.3)
SODIUM SERPL-SCNC: 133 MMOL/L (ref 135–144)

## 2024-02-05 PROCEDURE — 80048 BASIC METABOLIC PNL TOTAL CA: CPT

## 2024-02-05 PROCEDURE — P9603 ONE-WAY ALLOW PRORATED MILES: HCPCS

## 2024-02-05 PROCEDURE — 85014 HEMATOCRIT: CPT

## 2024-02-05 PROCEDURE — 36415 COLL VENOUS BLD VENIPUNCTURE: CPT

## 2024-02-05 PROCEDURE — 85018 HEMOGLOBIN: CPT

## 2024-02-12 ENCOUNTER — HOSPITAL ENCOUNTER (OUTPATIENT)
Age: 79
Setting detail: SPECIMEN
Discharge: HOME | End: 2024-02-12

## 2024-02-12 LAB
ANION GAP SERPL CALCULATED.3IONS-SCNC: 9 MMOL/L (ref 9–17)
BUN SERPL-MCNC: 48 MG/DL (ref 8–23)
BUN/CREAT SERPL: 34 (ref 9–20)
CALCIUM SERPL-MCNC: 9.3 MG/DL (ref 8.6–10.4)
CHLORIDE SERPL-SCNC: 100 MMOL/L (ref 98–107)
CO2 SERPL-SCNC: 27 MMOL/L (ref 20–31)
CREAT SERPL-MCNC: 1.4 MG/DL (ref 0.7–1.2)
ERYTHROCYTE [DISTWIDTH] IN BLOOD BY AUTOMATED COUNT: 17.1 % (ref 11.8–14.4)
GFR SERPL CREATININE-BSD FRML MDRD: 51 ML/MIN/1.73M2
GLUCOSE SERPL-MCNC: 73 MG/DL (ref 70–99)
HCT VFR BLD AUTO: 30.1 % (ref 40.7–50.3)
HGB BLD-MCNC: 9.2 G/DL (ref 13–17)
MAGNESIUM SERPL-MCNC: 1.8 MG/DL (ref 1.6–2.6)
MCH RBC QN AUTO: 31.6 PG (ref 25.2–33.5)
MCHC RBC AUTO-ENTMCNC: 30.6 G/DL (ref 28.4–34.8)
MCV RBC AUTO: 103.4 FL (ref 82.6–102.9)
NRBC BLD-RTO: 0 PER 100 WBC
PHOSPHATE SERPL-MCNC: 3.1 MG/DL (ref 2.5–4.5)
PLATELET # BLD AUTO: 366 K/UL (ref 138–453)
PMV BLD AUTO: 10.1 FL (ref 8.1–13.5)
POTASSIUM SERPL-SCNC: 4.3 MMOL/L (ref 3.7–5.3)
RBC # BLD AUTO: 2.91 M/UL (ref 4.21–5.77)
SODIUM SERPL-SCNC: 136 MMOL/L (ref 135–144)
WBC OTHER # BLD: 6.4 K/UL (ref 3.5–11.3)

## 2024-02-12 PROCEDURE — P9603 ONE-WAY ALLOW PRORATED MILES: HCPCS

## 2024-02-12 PROCEDURE — 36415 COLL VENOUS BLD VENIPUNCTURE: CPT

## 2024-02-12 PROCEDURE — 80048 BASIC METABOLIC PNL TOTAL CA: CPT

## 2024-02-12 PROCEDURE — 83735 ASSAY OF MAGNESIUM: CPT

## 2024-02-12 PROCEDURE — 85027 COMPLETE CBC AUTOMATED: CPT

## 2024-02-12 PROCEDURE — 84100 ASSAY OF PHOSPHORUS: CPT

## 2024-02-20 ENCOUNTER — HOSPITAL ENCOUNTER (OUTPATIENT)
Age: 79
Setting detail: SPECIMEN
Discharge: HOME | End: 2024-02-20

## 2024-02-20 ENCOUNTER — TELEPHONE (OUTPATIENT)
Dept: PRIMARY CARE CLINIC | Age: 79
End: 2024-02-20

## 2024-02-20 LAB
BILIRUB UR QL STRIP: NEGATIVE
CLARITY UR: CLEAR
COLOR UR: YELLOW
COMMENT: NORMAL
GLUCOSE UR STRIP-MCNC: NEGATIVE MG/DL
HGB UR QL STRIP.AUTO: NEGATIVE
KETONES UR STRIP-MCNC: NEGATIVE MG/DL
LEUKOCYTE ESTERASE UR QL STRIP: NEGATIVE
NITRITE UR QL STRIP: NEGATIVE
PH UR STRIP: 6 [PH] (ref 5–8)
PROT UR STRIP-MCNC: NEGATIVE MG/DL
SP GR UR STRIP: 1.01 (ref 1–1.03)
UROBILINOGEN UR STRIP-ACNC: NORMAL EU/DL (ref 0–1)

## 2024-02-20 PROCEDURE — 81003 URINALYSIS AUTO W/O SCOPE: CPT

## 2024-02-20 NOTE — TELEPHONE ENCOUNTER
Josee from [facility] called into office stating pt is getting discharged from nursing home soon and wondering when he was seen last at the office. Writer informed Josee that he was seen last September of 2023, so she requested pt needs to follow-up with PCP. She asked if PCP will follow for Home Health, writer told her yes as far as pt continue to see PCP.   Writer called patient to get him schedule for a follow-up appt and patient was confused asking why PCP cannot come to his house to see him and he has issue with transportation. Writer explained we do not do that and patient comes to the clinic to be seen by their PCP. Pt stated he needs to talk to his family first and see who will bring him for the appt and office to give him a call back around 3 pm if possible because he will forget .   Josee: [phone]

## 2024-02-21 ENCOUNTER — TELEPHONE (OUTPATIENT)
Dept: PRIMARY CARE CLINIC | Age: 79
End: 2024-02-21

## 2024-02-21 NOTE — TELEPHONE ENCOUNTER
Elle from [facility] called and said Pt will be getting D/C and is asking if you will be ok following for home care services ? Please advise    Elle [phone]

## 2024-02-28 ENCOUNTER — TELEMEDICINE (OUTPATIENT)
Dept: PRIMARY CARE CLINIC | Age: 79
End: 2024-02-28
Payer: MEDICARE

## 2024-02-28 DIAGNOSIS — I10 ESSENTIAL HYPERTENSION: ICD-10-CM

## 2024-02-28 DIAGNOSIS — E66.01 SEVERE OBESITY (BMI 35.0-39.9) WITH COMORBIDITY (HCC): ICD-10-CM

## 2024-02-28 DIAGNOSIS — J90 RECURRENT PLEURAL EFFUSION: ICD-10-CM

## 2024-02-28 DIAGNOSIS — I48.0 PAF (PAROXYSMAL ATRIAL FIBRILLATION) (HCC): ICD-10-CM

## 2024-02-28 DIAGNOSIS — I50.33 ACUTE ON CHRONIC HEART FAILURE WITH PRESERVED EJECTION FRACTION (HCC): Primary | ICD-10-CM

## 2024-02-28 DIAGNOSIS — N18.30 STAGE 3 CHRONIC KIDNEY DISEASE, UNSPECIFIED WHETHER STAGE 3A OR 3B CKD (HCC): ICD-10-CM

## 2024-02-28 DIAGNOSIS — D68.69 SECONDARY HYPERCOAGULABLE STATE (HCC): ICD-10-CM

## 2024-02-28 DIAGNOSIS — I50.32 CHRONIC DIASTOLIC CONGESTIVE HEART FAILURE (HCC): ICD-10-CM

## 2024-02-28 PROCEDURE — G2211 COMPLEX E/M VISIT ADD ON: HCPCS | Performed by: NURSE PRACTITIONER

## 2024-02-28 PROCEDURE — G8484 FLU IMMUNIZE NO ADMIN: HCPCS | Performed by: NURSE PRACTITIONER

## 2024-02-28 PROCEDURE — G8417 CALC BMI ABV UP PARAM F/U: HCPCS | Performed by: NURSE PRACTITIONER

## 2024-02-28 PROCEDURE — 1123F ACP DISCUSS/DSCN MKR DOCD: CPT | Performed by: NURSE PRACTITIONER

## 2024-02-28 PROCEDURE — G8427 DOCREV CUR MEDS BY ELIG CLIN: HCPCS | Performed by: NURSE PRACTITIONER

## 2024-02-28 PROCEDURE — 99214 OFFICE O/P EST MOD 30 MIN: CPT | Performed by: NURSE PRACTITIONER

## 2024-02-28 PROCEDURE — 1036F TOBACCO NON-USER: CPT | Performed by: NURSE PRACTITIONER

## 2024-02-28 ASSESSMENT — PATIENT HEALTH QUESTIONNAIRE - PHQ9
6. FEELING BAD ABOUT YOURSELF - OR THAT YOU ARE A FAILURE OR HAVE LET YOURSELF OR YOUR FAMILY DOWN: 0
SUM OF ALL RESPONSES TO PHQ QUESTIONS 1-9: 0
SUM OF ALL RESPONSES TO PHQ9 QUESTIONS 1 & 2: 0
3. TROUBLE FALLING OR STAYING ASLEEP: 0
SUM OF ALL RESPONSES TO PHQ QUESTIONS 1-9: 0
4. FEELING TIRED OR HAVING LITTLE ENERGY: 0
7. TROUBLE CONCENTRATING ON THINGS, SUCH AS READING THE NEWSPAPER OR WATCHING TELEVISION: 0
8. MOVING OR SPEAKING SO SLOWLY THAT OTHER PEOPLE COULD HAVE NOTICED. OR THE OPPOSITE, BEING SO FIGETY OR RESTLESS THAT YOU HAVE BEEN MOVING AROUND A LOT MORE THAN USUAL: 0
SUM OF ALL RESPONSES TO PHQ QUESTIONS 1-9: 0
9. THOUGHTS THAT YOU WOULD BE BETTER OFF DEAD, OR OF HURTING YOURSELF: 0
2. FEELING DOWN, DEPRESSED OR HOPELESS: 0
SUM OF ALL RESPONSES TO PHQ QUESTIONS 1-9: 0
5. POOR APPETITE OR OVEREATING: 0
10. IF YOU CHECKED OFF ANY PROBLEMS, HOW DIFFICULT HAVE THESE PROBLEMS MADE IT FOR YOU TO DO YOUR WORK, TAKE CARE OF THINGS AT HOME, OR GET ALONG WITH OTHER PEOPLE: 0
1. LITTLE INTEREST OR PLEASURE IN DOING THINGS: 0

## 2024-02-28 NOTE — PROGRESS NOTES
[FreeTextEntry1] : RESULTS TRANSMISSION\par Melissa Keller is a 78-year-old female who was called 04/05/2023 for a discussion regarding their genetic testing results related to hereditary cancer predisposition. \par \par Ms. Keller was originally seen at [facility] on 03/01/2023 for hereditary cancer predisposition risk assessment. She has a personal history of breast cancer at age 73 and a family history of breast and pancreatic cancer in the setting of Ashkenazi Restoration heritage. Ms. Keller decided to pursue genetic testing using a guidelines-based breast and gyn cancers panel and pancreatic cancer panel (27 genes) offered by [facility]. \par \par TEST RESULTS: NEGATIVE\par \par No pathogenic (disease-causing) variants or VUSs were detected in the following genes: APC*, ERNESTINA*, BARD1, BMPR1A, BRCA1, BRCA2, BRIP1, CDH1, CDKN2A (p14ARF), CDKN2A ([id number]), CHEK2, EPCAM*, MEN1*, MLH1*, MSH2*, MSH6*, NF1*, PALB2, PMS2*, PTEN*, RAD51C, RAD51D, SMAD4, STK11, TP53, TSC1*, TSC2, VHL\par \par RESULTS INTERPRETATION AND ASSESSMENT:\par Given Ms. Keller’s personal and current reported family history of cancer, and her negative genetic test results, long-term management and surveillance should be based on her on- or post-treatment protocol as recommended by her oncology team.  In the absence of other indications, Ms. Keller should practice age-appropriate cancer screening of other organ systems as recommended for the general population.\par \par We also discussed that, while no cause of the patient’s personal and family history of cancer was identified, this result, while reassuring, does entirely not rule out a hereditary cancer risk in the patient. It is possible, although unlikely, the patient has a mutation in one of the genes tested that is not detectable by this analysis, or has a mutation in a different gene, either known or unknown. It is also possible there is a hereditary cancer predisposition in the family, but the patient did not inherit it.\par \par We informed Ms. Keller that our knowledge of genetics and inherited cancer conditions is changing rapidly. Therefore, we recommended that Ms. Keller contact our office, every 2 to 3 years, to discuss relevant advances in cancer genetics.  We emphasized the importance of re-contacting us with updates regarding her personal and family history of cancer as well as any updates regarding additional cancer genetic test results performed for the patient and/or family members.  Such updates could possibly change our risk assessment and recommendations. \par \par In addition, we discussed Ms. Keller’s daughter who was diagnosed with early-onset breast cancer could consider pursuing cancer risk assessment genetic counseling with the option of genetic testing in [place]. \par \par PLAN:\par 1.	See above for recommended screening and risk-reduction strategies.\par 2.	Patient informed consult note(s) will be available through their [facility] [facility] patient portal and genetic test results will be released via [facility]’s laboratory portal. \par 3.	Ms. Keller was encouraged to contact us every 2-3 years to discuss relevant advances in cancer genetics, or sooner if there are any changes in her personal or family history of cancer.\par \par \par For any additional questions please call [facility] at [phone]. \par \par \par Kushal Oliva MS, [facility] [place] Genetic Counselor, [facility]\par \par \par CC: \par Patient\par Dr. Angela Berry
difficulty breathing or respiratory distress        [] Abnormal-      Musculoskeletal:   [] Normal gait with no signs of ataxia         [x] Normal range of motion of neck        [] Abnormal-       Neurological:        [x] No Facial Asymmetry (Cranial nerve 7 motor function) (limited exam to video visit)          [] No gaze palsy        [] Abnormal-         Skin:        [x] No significant exanthematous lesions or discoloration noted on facial skin         [] Abnormal-            Psychiatric:       [x] Normal Affect [] No Hallucinations        [] Abnormal-     ASSESSMENT/PLAN:  1. Acute on chronic heart failure with preserved ejection fraction (HCC)  Following with cardiology.  He recently was started on Bumex twice a day.  He was able to lose a significant amount of weight.  Will try to contact nephrology for clarification now with his diuretics as they seem to be managing it.  2. Chronic diastolic congestive heart failure ([facility])  Follows with cards and nephrology     3. PAF (paroxysmal atrial fibrillation) ([facility])  Stable.  Follows with cardiology    4. Secondary hypercoagulable state ([facility])  No signs of bleeding.  Stable.  On Eliquis 5 mg twice a day    5. Essential hypertension  Patient states his blood pressure has been controlled at home.  Continue with metoprolol 100 mg send related nightly    6. Severe obesity (BMI 35.0-39.9) with comorbidity ([facility])  Stable. Weight is down d/t water retention. Will update weight next time in office. Pt does monitor at home.   7. Recurrent pleural effusion  Resolved   8. Stage 3 chronic kidney disease, unspecified whether stage 3a or 3b CKD ([facility])  Follows with nephrology  Will call to clarify Bumex      Return in about 3 months (around 5/28/2024) for follow up.    Jareth Paulino, was evaluated through a synchronous (real-time) audio-video encounter. The patient (or guardian if applicable) is aware that this is a billable service, which includes applicable co-pays. This Virtual Visit

## 2024-03-01 ENCOUNTER — TELEPHONE (OUTPATIENT)
Dept: PRIMARY CARE CLINIC | Age: 79
End: 2024-03-01

## 2024-03-01 PROBLEM — J90 RECURRENT PLEURAL EFFUSION: Status: RESOLVED | Noted: 2023-09-22 | Resolved: 2024-03-01

## 2024-03-01 PROBLEM — D68.69 SECONDARY HYPERCOAGULABLE STATE (HCC): Status: ACTIVE | Noted: 2024-03-01

## 2024-03-01 PROBLEM — Z96.642 STATUS POST TOTAL HIP REPLACEMENT, LEFT: Status: RESOLVED | Noted: 2018-03-01 | Resolved: 2024-03-01

## 2024-03-01 PROBLEM — J90 PLEURAL EFFUSION: Status: RESOLVED | Noted: 2023-11-01 | Resolved: 2024-03-01

## 2024-03-01 PROBLEM — J90 PLEURAL EFFUSION ON LEFT: Status: RESOLVED | Noted: 2023-11-02 | Resolved: 2024-03-01

## 2024-03-01 PROBLEM — N18.30 STAGE 3 CHRONIC KIDNEY DISEASE (HCC): Status: ACTIVE | Noted: 2024-03-01

## 2024-03-01 PROBLEM — E66.01 SEVERE OBESITY (BMI 35.0-39.9) WITH COMORBIDITY (HCC): Status: ACTIVE | Noted: 2024-03-01

## 2024-03-01 ASSESSMENT — ENCOUNTER SYMPTOMS
DIARRHEA: 0
EYE DISCHARGE: 0
EYE ITCHING: 0
SHORTNESS OF BREATH: 0
WHEEZING: 0
CHEST TIGHTNESS: 0
VOMITING: 0
EYE REDNESS: 0
NAUSEA: 0
TROUBLE SWALLOWING: 0
SINUS PAIN: 0
SINUS PRESSURE: 0
SORE THROAT: 0
ABDOMINAL PAIN: 0
COUGH: 0

## 2024-03-08 ENCOUNTER — TELEPHONE (OUTPATIENT)
Dept: ORTHOPEDIC SURGERY | Age: 79
End: 2024-03-08

## 2024-03-08 NOTE — TELEPHONE ENCOUNTER
Maci calling [facility] [phone] and wanted to verify pts weight bearing status regarding his arm.. According to the last OV note, pt can weight bare as tolerated.  Maci was informed of this and she will proceed accordingly.  This was verified with Maria C.

## 2024-03-11 ENCOUNTER — TELEPHONE (OUTPATIENT)
Dept: PRIMARY CARE CLINIC | Age: 79
End: 2024-03-11

## 2024-03-11 NOTE — TELEPHONE ENCOUNTER
[facility] called into office stating pt Fell this morning on his way to the bathroom without any of his device to help. She stated pt denied any injury and his friends where there to help him get up. She said pt might need an in-home care because of his weakness and he is unable to take care of himself like using the bathroom, bathing, getting things done around the house etc.     Please advise   Thanks

## 2024-03-11 NOTE — TELEPHONE ENCOUNTER
I am not sure we can get him \"in home care\". He may get an aid for a few hours a few times a week. I can see if our social workers can help    Irwin and jacob, can one of you look into this for me?

## 2024-03-12 ENCOUNTER — CARE COORDINATION (OUTPATIENT)
Dept: CARE COORDINATION | Age: 79
End: 2024-03-12

## 2024-03-12 NOTE — CARE COORDINATION
Writer received the Referral from the Patient's PCP and writer called the Nurse that was in the Home from [facility], she did not answer but called writer back.    She disclosed what she has seen while in the Home, Patient is a [place] and they are working with him from a Therapy aspect to aid with his mobility.    Writer inquired if they have AIDES available and she was not sure, but said to call the office.    Writer will speak to Patient's PCP and we will devise a plan of care for Patient.

## 2024-03-12 NOTE — TELEPHONE ENCOUNTER
On 02/202/2024 Encounter stated that Pt had declined any skilled nursing. He just wanted PT and OT . Please see encounter .

## 2024-03-14 ENCOUNTER — PATIENT MESSAGE (OUTPATIENT)
Dept: PRIMARY CARE CLINIC | Age: 79
End: 2024-03-14

## 2024-03-14 ENCOUNTER — CARE COORDINATION (OUTPATIENT)
Dept: CARE COORDINATION | Age: 79
End: 2024-03-14

## 2024-03-14 NOTE — TELEPHONE ENCOUNTER
From: Jareth Paulino  To: Zuleika Arreola  Sent: 3/14/2024 4:44 PM EDT  Subject: Jonh Paulino    Good afternoon     Have a question about meds.  My dad was always on Eliquis for his Blood Thinner.  When he came home from [facility] he was on Apixaban ..  Is it ok we went back to the Eliquis?

## 2024-03-14 NOTE — CARE COORDINATION
called Patient's Daughter to do a follow up Social service call with her in regards to Patient and his needs.    She did not answer the phone,  left a voicemail for her asking for a return call.

## 2024-03-19 ENCOUNTER — APPOINTMENT (OUTPATIENT)
Dept: CT IMAGING | Age: 79
End: 2024-03-19
Payer: MEDICARE

## 2024-03-19 ENCOUNTER — HOSPITAL ENCOUNTER (INPATIENT)
Age: 79
LOS: 3 days | Discharge: HOME HEALTH | End: 2024-03-22
Attending: EMERGENCY MEDICINE | Admitting: STUDENT IN AN ORGANIZED HEALTH CARE EDUCATION/TRAINING PROGRAM
Payer: MEDICARE

## 2024-03-19 ENCOUNTER — APPOINTMENT (OUTPATIENT)
Dept: GENERAL RADIOLOGY | Age: 79
End: 2024-03-19
Payer: MEDICARE

## 2024-03-19 ENCOUNTER — CARE COORDINATION (OUTPATIENT)
Dept: CARE COORDINATION | Age: 79
End: 2024-03-19

## 2024-03-19 DIAGNOSIS — N17.9 AKI (ACUTE KIDNEY INJURY) (HCC): Primary | ICD-10-CM

## 2024-03-19 DIAGNOSIS — R79.89 ELEVATED TROPONIN: ICD-10-CM

## 2024-03-19 LAB
ALBUMIN SERPL-MCNC: 2.9 G/DL (ref 3.5–5.2)
ALBUMIN/GLOB SERPL: 1.1 {RATIO} (ref 1–2.5)
ALP SERPL-CCNC: 141 U/L (ref 40–129)
ALT SERPL-CCNC: 42 U/L (ref 5–41)
ANION GAP SERPL CALCULATED.3IONS-SCNC: 10 MMOL/L (ref 9–17)
AST SERPL-CCNC: 66 U/L
BASOPHILS # BLD: 0 K/UL (ref 0–0.2)
BASOPHILS NFR BLD: 0 % (ref 0–2)
BILIRUB SERPL-MCNC: 0.6 MG/DL (ref 0.3–1.2)
BUN SERPL-MCNC: 61 MG/DL (ref 8–23)
CALCIUM SERPL-MCNC: 10 MG/DL (ref 8.6–10.4)
CHLORIDE SERPL-SCNC: 101 MMOL/L (ref 98–107)
CO2 SERPL-SCNC: 27 MMOL/L (ref 20–31)
CREAT SERPL-MCNC: 2.6 MG/DL (ref 0.7–1.2)
EOSINOPHIL # BLD: 0 K/UL (ref 0–0.4)
EOSINOPHILS RELATIVE PERCENT: 1 % (ref 1–4)
ERYTHROCYTE [DISTWIDTH] IN BLOOD BY AUTOMATED COUNT: 17.3 % (ref 12.5–15.4)
GFR SERPL CREATININE-BSD FRML MDRD: 24 ML/MIN/1.73M2
GLUCOSE SERPL-MCNC: 99 MG/DL (ref 70–99)
HCT VFR BLD AUTO: 32.9 % (ref 41–53)
HGB BLD-MCNC: 11 G/DL (ref 13.5–17.5)
LYMPHOCYTES NFR BLD: 1.7 K/UL (ref 1–4.8)
LYMPHOCYTES RELATIVE PERCENT: 19 % (ref 24–44)
MCH RBC QN AUTO: 32.6 PG (ref 26–34)
MCHC RBC AUTO-ENTMCNC: 33.5 G/DL (ref 31–37)
MCV RBC AUTO: 97.5 FL (ref 80–100)
MONOCYTES NFR BLD: 1 K/UL (ref 0.1–1.2)
MONOCYTES NFR BLD: 11 % (ref 2–11)
NEUTROPHILS NFR BLD: 69 % (ref 36–66)
NEUTS SEG NFR BLD: 6.2 K/UL (ref 1.8–7.7)
PLATELET # BLD AUTO: 334 K/UL (ref 140–450)
PMV BLD AUTO: 8.3 FL (ref 6–12)
POTASSIUM SERPL-SCNC: 4.4 MMOL/L (ref 3.7–5.3)
PROT SERPL-MCNC: 5.6 G/DL (ref 6.4–8.3)
RBC # BLD AUTO: 3.38 M/UL (ref 4.5–5.9)
SODIUM SERPL-SCNC: 138 MMOL/L (ref 135–144)
TROPONIN I SERPL HS-MCNC: 83 NG/L (ref 0–22)
TROPONIN I SERPL HS-MCNC: 93 NG/L (ref 0–22)
WBC OTHER # BLD: 8.9 K/UL (ref 3.5–11)

## 2024-03-19 PROCEDURE — 2060000000 HC ICU INTERMEDIATE R&B

## 2024-03-19 PROCEDURE — 73060 X-RAY EXAM OF HUMERUS: CPT

## 2024-03-19 PROCEDURE — 72125 CT NECK SPINE W/O DYE: CPT

## 2024-03-19 PROCEDURE — 84484 ASSAY OF TROPONIN QUANT: CPT

## 2024-03-19 PROCEDURE — 85025 COMPLETE CBC W/AUTO DIFF WBC: CPT

## 2024-03-19 PROCEDURE — 2580000003 HC RX 258: Performed by: NURSE PRACTITIONER

## 2024-03-19 PROCEDURE — 93005 ELECTROCARDIOGRAM TRACING: CPT | Performed by: NURSE PRACTITIONER

## 2024-03-19 PROCEDURE — 80053 COMPREHEN METABOLIC PANEL: CPT

## 2024-03-19 PROCEDURE — 36415 COLL VENOUS BLD VENIPUNCTURE: CPT

## 2024-03-19 PROCEDURE — 70450 CT HEAD/BRAIN W/O DYE: CPT

## 2024-03-19 PROCEDURE — 99285 EMERGENCY DEPT VISIT HI MDM: CPT

## 2024-03-19 PROCEDURE — 6370000000 HC RX 637 (ALT 250 FOR IP): Performed by: STUDENT IN AN ORGANIZED HEALTH CARE EDUCATION/TRAINING PROGRAM

## 2024-03-19 PROCEDURE — 71045 X-RAY EXAM CHEST 1 VIEW: CPT

## 2024-03-19 PROCEDURE — 2580000003 HC RX 258: Performed by: STUDENT IN AN ORGANIZED HEALTH CARE EDUCATION/TRAINING PROGRAM

## 2024-03-19 PROCEDURE — 6360000002 HC RX W HCPCS: Performed by: STUDENT IN AN ORGANIZED HEALTH CARE EDUCATION/TRAINING PROGRAM

## 2024-03-19 PROCEDURE — 99222 1ST HOSP IP/OBS MODERATE 55: CPT | Performed by: STUDENT IN AN ORGANIZED HEALTH CARE EDUCATION/TRAINING PROGRAM

## 2024-03-19 RX ORDER — SODIUM CHLORIDE 0.9 % (FLUSH) 0.9 %
5-40 SYRINGE (ML) INJECTION PRN
Status: DISCONTINUED | OUTPATIENT
Start: 2024-03-19 | End: 2024-03-22 | Stop reason: HOSPADM

## 2024-03-19 RX ORDER — ACETAMINOPHEN 325 MG/1
650 TABLET ORAL EVERY 6 HOURS PRN
Status: DISCONTINUED | OUTPATIENT
Start: 2024-03-19 | End: 2024-03-22 | Stop reason: HOSPADM

## 2024-03-19 RX ORDER — HEPARIN SODIUM 5000 [USP'U]/ML
5000 INJECTION, SOLUTION INTRAVENOUS; SUBCUTANEOUS EVERY 8 HOURS SCHEDULED
Status: DISCONTINUED | OUTPATIENT
Start: 2024-03-19 | End: 2024-03-22 | Stop reason: HOSPADM

## 2024-03-19 RX ORDER — ACETAMINOPHEN 650 MG/1
650 SUPPOSITORY RECTAL EVERY 6 HOURS PRN
Status: DISCONTINUED | OUTPATIENT
Start: 2024-03-19 | End: 2024-03-22 | Stop reason: HOSPADM

## 2024-03-19 RX ORDER — SODIUM CHLORIDE 9 MG/ML
INJECTION, SOLUTION INTRAVENOUS PRN
Status: DISCONTINUED | OUTPATIENT
Start: 2024-03-19 | End: 2024-03-22 | Stop reason: HOSPADM

## 2024-03-19 RX ORDER — PANTOPRAZOLE SODIUM 40 MG/1
40 TABLET, DELAYED RELEASE ORAL DAILY
Status: DISCONTINUED | OUTPATIENT
Start: 2024-03-20 | End: 2024-03-22 | Stop reason: HOSPADM

## 2024-03-19 RX ORDER — LEVOTHYROXINE SODIUM 0.07 MG/1
150 TABLET ORAL DAILY
Status: DISCONTINUED | OUTPATIENT
Start: 2024-03-20 | End: 2024-03-22 | Stop reason: HOSPADM

## 2024-03-19 RX ORDER — LANOLIN ALCOHOL/MO/W.PET/CERES
325 CREAM (GRAM) TOPICAL
Status: DISCONTINUED | OUTPATIENT
Start: 2024-03-20 | End: 2024-03-22 | Stop reason: HOSPADM

## 2024-03-19 RX ORDER — ESCITALOPRAM OXALATE 10 MG/1
10 TABLET ORAL DAILY
Status: DISCONTINUED | OUTPATIENT
Start: 2024-03-20 | End: 2024-03-22 | Stop reason: HOSPADM

## 2024-03-19 RX ORDER — ESCITALOPRAM OXALATE 10 MG/1
10 TABLET ORAL DAILY
Qty: 30 TABLET | Refills: 0 | Status: SHIPPED | OUTPATIENT
Start: 2024-03-19

## 2024-03-19 RX ORDER — SODIUM CHLORIDE 9 MG/ML
INJECTION, SOLUTION INTRAVENOUS CONTINUOUS
Status: ACTIVE | OUTPATIENT
Start: 2024-03-19 | End: 2024-03-21

## 2024-03-19 RX ORDER — ROSUVASTATIN CALCIUM 5 MG/1
5 TABLET, COATED ORAL DAILY
Status: DISCONTINUED | OUTPATIENT
Start: 2024-03-19 | End: 2024-03-22 | Stop reason: HOSPADM

## 2024-03-19 RX ORDER — IPRATROPIUM BROMIDE AND ALBUTEROL SULFATE 2.5; .5 MG/3ML; MG/3ML
1 SOLUTION RESPIRATORY (INHALATION) EVERY 4 HOURS PRN
Status: DISCONTINUED | OUTPATIENT
Start: 2024-03-19 | End: 2024-03-22 | Stop reason: HOSPADM

## 2024-03-19 RX ORDER — ONDANSETRON 2 MG/ML
4 INJECTION INTRAMUSCULAR; INTRAVENOUS EVERY 6 HOURS PRN
Status: DISCONTINUED | OUTPATIENT
Start: 2024-03-19 | End: 2024-03-22 | Stop reason: HOSPADM

## 2024-03-19 RX ORDER — MIDODRINE HYDROCHLORIDE 5 MG/1
5 TABLET ORAL
Status: DISCONTINUED | OUTPATIENT
Start: 2024-03-20 | End: 2024-03-22 | Stop reason: HOSPADM

## 2024-03-19 RX ORDER — 0.9 % SODIUM CHLORIDE 0.9 %
500 INTRAVENOUS SOLUTION INTRAVENOUS ONCE
Status: COMPLETED | OUTPATIENT
Start: 2024-03-19 | End: 2024-03-19

## 2024-03-19 RX ORDER — POLYETHYLENE GLYCOL 3350 17 G/17G
17 POWDER, FOR SOLUTION ORAL DAILY PRN
Status: DISCONTINUED | OUTPATIENT
Start: 2024-03-19 | End: 2024-03-22 | Stop reason: HOSPADM

## 2024-03-19 RX ORDER — ONDANSETRON 4 MG/1
4 TABLET, ORALLY DISINTEGRATING ORAL EVERY 8 HOURS PRN
Status: DISCONTINUED | OUTPATIENT
Start: 2024-03-19 | End: 2024-03-22 | Stop reason: HOSPADM

## 2024-03-19 RX ORDER — SODIUM CHLORIDE 0.9 % (FLUSH) 0.9 %
5-40 SYRINGE (ML) INJECTION EVERY 12 HOURS SCHEDULED
Status: DISCONTINUED | OUTPATIENT
Start: 2024-03-19 | End: 2024-03-22 | Stop reason: HOSPADM

## 2024-03-19 RX ORDER — TAMSULOSIN HYDROCHLORIDE 0.4 MG/1
0.4 CAPSULE ORAL DAILY
Status: DISCONTINUED | OUTPATIENT
Start: 2024-03-19 | End: 2024-03-22 | Stop reason: HOSPADM

## 2024-03-19 RX ORDER — BUMETANIDE 2 MG/1
4 TABLET ORAL DAILY
Status: ON HOLD | COMMUNITY
Start: 2024-03-01 | End: 2024-03-22

## 2024-03-19 RX ADMIN — SODIUM CHLORIDE 500 ML: 9 INJECTION, SOLUTION INTRAVENOUS at 16:14

## 2024-03-19 RX ADMIN — SODIUM CHLORIDE: 9 INJECTION, SOLUTION INTRAVENOUS at 20:40

## 2024-03-19 RX ADMIN — HEPARIN SODIUM 5000 UNITS: 5000 INJECTION INTRAVENOUS; SUBCUTANEOUS at 21:59

## 2024-03-19 RX ADMIN — TAMSULOSIN HYDROCHLORIDE 0.4 MG: 0.4 CAPSULE ORAL at 21:57

## 2024-03-19 ASSESSMENT — ENCOUNTER SYMPTOMS
DIARRHEA: 0
BACK PAIN: 0
ABDOMINAL PAIN: 0
VOMITING: 0
NAUSEA: 0
EYE DISCHARGE: 0
SHORTNESS OF BREATH: 1
COUGH: 0
CONSTIPATION: 0

## 2024-03-19 ASSESSMENT — PAIN - FUNCTIONAL ASSESSMENT: PAIN_FUNCTIONAL_ASSESSMENT: NONE - DENIES PAIN

## 2024-03-19 NOTE — ED PROVIDER NOTES
[facility] EMERGENCY DEPARTMENT  EMERGENCY DEPARTMENT ENCOUNTER      Pt Name: Jareth Paulino  MRN: [id number]  Birthdate 1945  Date of evaluation: 3/19/2024  Provider: ANDERSON Mills CNP  7:21 PM    CHIEF COMPLAINT       Chief Complaint   Patient presents with    Fatigue     Discharged from [place] 3 week ago, increased weakness at home.         HISTORY OF PRESENT ILLNESS    Jareth Paulino is a 79 y.o. male who presents to the emergency department      This is a nontoxic-appearing 79-year-old male presenting via EMS from home, the patient states that he was standing up from his wheelchair to go lay down in the bed when he fell forward into the bed half on the bed and his legs still on the floor, he is anticoagulated with Eliquis due to A-fib; he has visiting nurses that come in 6 days a week, reports that he has no acute pain complaints but states that he is overall had fatigued, and chronic shortness of breath he does have a history of pneumonia and a right humerus fracture the patient states approximately 4 months ago, was at an extended care facility for rehab and has since returned home.  The patient's daughter was concerned due to the fall prompting her to call EMS to have the patient evaluated in the emergency department the patient denies that he has had any fevers recently states that he has been eating and drinking okay, he has had increased urination which he attributes to the increased water pills that he is on, he has been moving his bowels without difficulty.  Patient states that he was slightly woozy when he stood up from the wheelchair but he had no headache prior to the fall, chest pain or acute shortness of breath.    The history is provided by the patient, medical records and the EMS personnel.       Nursing Notes were reviewed.    REVIEW OF SYSTEMS       Review of Systems   Constitutional:  Positive for fatigue. Negative for chills and fever.        Positive for fall from  TIERNEY (acute kidney injury) (HCC)    2. Elevated troponin          DISPOSITION/PLAN   DISPOSITION Admitted 03/19/2024 05:39:26 PM      PATIENT REFERRED TO:  No follow-up provider specified.    DISCHARGE MEDICATIONS:  New Prescriptions    No medications on file     Controlled Substances Monitoring:          No data to display                (Please note that portions of this note were completed with a voice recognition program.  Efforts were made to edit the dictations but occasionally words are mis-transcribed.)    ANDERSON Mills CNP (electronically signed)  Attending Emergency Physician           Leeann Roblero APRN - CNP  03/19/24 [id number]

## 2024-03-19 NOTE — ED PROVIDER NOTES
here.      (Please note that portions of this note were completed with a voice recognition program.  Efforts were made to edit the dictations but occasionally words are mis-transcribed.)    Dirk Jackson MD,, MD, F.A.C.E.P.  Attending Emergency Medicine Physician        Dirk Jackson MD  03/19/24 [id number]       Dirk Jackson MD  03/19/24 5202

## 2024-03-19 NOTE — H&P
[facility]  Office: [phone]  Ebenezer Iyer DO, Ta Curran DO, Matt Beckford DO, Krunal Martinez DO, Natacha Rosario MD, Katy Loomis MD, Jolene Arnett MD, Malka Llamas MD,  Christoph Brooks MD, Carlos Bowie MD, Gabbi Teran MD,  Wang Silver DO, Nenita Guerrero MD, Rashel Ha MD, Theo Iyer DO, Marifer Simmons MD,  Markie So DO, Cathi Hawkins MD, Marjorie Davis MD, Claudette Shen MD, Brian Brown MD,  Robin Andino MD, Kimberley Bowser MD, Con Mitchell MD, Robinson Devries MD, Stewart Harley MD, Erik Tang MD, Hilario Hurtado DO, Ranjeet Smith DO, Alyse Phillips MD,  Parish Dale MD, Shirley Waterhouse, CNP,  Michelle Fischer, CNP, Melo Ram, CNP,  Aparna Cardoso, DNP, Zara Haywood, CNP, Mary Jane Rice, CNP, Batool Burns CNP, Carmelita Mckeon, CNP, Vale Barakat, CNP, Bryanna Ibarra, PA-C, Bertha Blakely, PA-C, Sue Brady, CNP, Belia Argueta, CNP, Meme Spangler, CNP, Raysa Terrell, CNS, Tami Clemens, CNP, Brenda Stokes, CNP, Tracy Schwab, CNP         [facility]   IN-PATIENT SERVICE   [facility]    HISTORY AND PHYSICAL EXAMINATION            Date:   3/19/2024  Patient name:  Jareth Paulino  Date of admission:  3/19/2024  2:37 PM  MRN:   [id number]  Account:  [id number]  YOB: 1945  PCP:    Zuleika Arreola APRN - CNP  Room:   ER06/ER06  Code Status:    Prior      History Obtained From:     patient    History of Present Illness:     Jareth Paulino is a 79 y.o. male with a past medical history of HFpEF, paroxsymal atrial fibrillation (on Eliquis), hypothyroidism and hyperlipidemia who presented to the emergency department on 3/19/2024 complaining of fatigue and frequent falls at home. The patient states that he was just released from [facility] a few weeks ago and states that he has felt increasingly weak since then. He recently had his Bumex increased to 2 mg daily and says that he has lost quite a bit of  intracranial abnormalities are noted. Multilevel cervical spondylosis and degenerative disc disease. Evidence of paracervical spasm. Grade 1 spondylolisthesis of C2 on C3, possibly chronic. RECOMMENDATIONS: Further evaluation of the cervical spine should be obtained with MR imaging if clinically indicated.     CT CERVICAL SPINE WO CONTRAST    Result Date: 3/19/2024  Patient motion artifact degrading image resolution Mild central and cortical cerebral atrophy. Mild chronic deep white matter ischemic changes No acute intracranial abnormalities are noted. Multilevel cervical spondylosis and degenerative disc disease. Evidence of paracervical spasm. Grade 1 spondylolisthesis of C2 on C3, possibly chronic. RECOMMENDATIONS: Further evaluation of the cervical spine should be obtained with MR imaging if clinically indicated.       Assessment :      Hospital Problems             Last Modified POA    * (Principal) TIERNEY (acute kidney injury) ([facility]) 3/19/2024 Yes    Hyperlipidemia 3/19/2024 Yes    Essential hypertension 3/19/2024 Yes    PAF (paroxysmal atrial fibrillation) ([facility]) 3/19/2024 Yes    Chronic diastolic congestive heart failure ([facility]) 3/19/2024 Yes    Secondary hypercoagulable state ([facility]) 3/19/2024 Yes    Severe obesity (BMI 35.0-39.9) with comorbidity ([facility]) 3/19/2024 Yes    Stage 3 chronic kidney disease ([facility]) 3/19/2024 Yes       Plan:     TIERNEY on CKD   -Likely secondary to diuresis   -Start maintenance fluids at 50 mL/hr   -Daily BMP   -Strict I&O's   -Will consider nephrology consultation if renal function worsens     Elevated troponin   -Trending down   -Secondary to TIERNEY as above     Right humerus fracture   -The patient follows with Dr. Guerrier who has recommended non-operative management     Paroxsymal atrial fibrillation   -Continue home metoprolol   -Continue home Eliquis     HFpEF   -Not currently in acute exacerbation   -Holding home Bumex 2/2 TIERNEY as above     Hypothyroidism   -Continue home levothyroxine

## 2024-03-19 NOTE — CARE COORDINATION
Writer called Patient's Daughter to do a follow up Social service call with her.    She reported that she was at work, but she was taking Patient to go get checked out on today as she felt he was not himself.    Vegar asked for her to call me once she gets him checked out and let me know what was said that will give us a better idea on what Patient's needs are at that point.    She said that she would do so, writer will wait to hear back from Patient's Daughter.

## 2024-03-20 ENCOUNTER — APPOINTMENT (OUTPATIENT)
Age: 79
End: 2024-03-20
Attending: STUDENT IN AN ORGANIZED HEALTH CARE EDUCATION/TRAINING PROGRAM
Payer: MEDICARE

## 2024-03-20 LAB
ANION GAP SERPL CALCULATED.3IONS-SCNC: 10 MMOL/L (ref 9–17)
BUN SERPL-MCNC: 59 MG/DL (ref 8–23)
CALCIUM SERPL-MCNC: 9.5 MG/DL (ref 8.6–10.4)
CHLORIDE SERPL-SCNC: 104 MMOL/L (ref 98–107)
CO2 SERPL-SCNC: 24 MMOL/L (ref 20–31)
CREAT SERPL-MCNC: 2.1 MG/DL (ref 0.7–1.2)
ECHO AO ROOT DIAM: 3.8 CM
ECHO AO ROOT INDEX: 1.68 CM/M2
ECHO AV MEAN GRADIENT: 2 MMHG
ECHO AV MEAN VELOCITY: 0.7 M/S
ECHO AV PEAK GRADIENT: 3 MMHG
ECHO AV PEAK VELOCITY: 0.9 M/S
ECHO AV VELOCITY RATIO: 0.78
ECHO AV VTI: 20.7 CM
ECHO BSA: 2.32 M2
ECHO IVC EXP: 1.2 CM
ECHO IVC INSP: 0.4 CM
ECHO LA AREA 2C: 25.8 CM2
ECHO LA AREA 4C: 28.7 CM2
ECHO LA DIAMETER INDEX: 2.3 CM/M2
ECHO LA DIAMETER: 5.2 CM
ECHO LA MAJOR AXIS: 6.5 CM
ECHO LA MINOR AXIS: 6.3 CM
ECHO LA TO AORTIC ROOT RATIO: 1.37
ECHO LA VOL BP: 94 ML (ref 18–58)
ECHO LA VOL MOD A2C: 86 ML (ref 18–58)
ECHO LA VOL MOD A4C: 100 ML (ref 18–58)
ECHO LA VOL/BSA BIPLANE: 42 ML/M2 (ref 16–34)
ECHO LA VOLUME INDEX MOD A2C: 38 ML/M2 (ref 16–34)
ECHO LA VOLUME INDEX MOD A4C: 44 ML/M2 (ref 16–34)
ECHO LV E' LATERAL VELOCITY: 6 CM/S
ECHO LV E' SEPTAL VELOCITY: 5 CM/S
ECHO LV FRACTIONAL SHORTENING: 43 % (ref 28–44)
ECHO LV INTERNAL DIMENSION DIASTOLE INDEX: 1.86 CM/M2
ECHO LV INTERNAL DIMENSION DIASTOLIC: 4.2 CM (ref 4.2–5.9)
ECHO LV INTERNAL DIMENSION SYSTOLIC INDEX: 1.06 CM/M2
ECHO LV INTERNAL DIMENSION SYSTOLIC: 2.4 CM
ECHO LV IVSD: 1.2 CM (ref 0.6–1)
ECHO LV MASS 2D: 167.4 G (ref 88–224)
ECHO LV MASS INDEX 2D: 74.1 G/M2 (ref 49–115)
ECHO LV POSTERIOR WALL DIASTOLIC: 1.1 CM (ref 0.6–1)
ECHO LV RELATIVE WALL THICKNESS RATIO: 0.52
ECHO LVOT AREA: 4.5 CM2
ECHO LVOT AV VTI INDEX: 0.85
ECHO LVOT DIAM: 2.4 CM
ECHO LVOT MEAN GRADIENT: 1 MMHG
ECHO LVOT PEAK GRADIENT: 2 MMHG
ECHO LVOT PEAK VELOCITY: 0.7 M/S
ECHO LVOT STROKE VOLUME INDEX: 35 ML/M2
ECHO LVOT SV: 79.1 ML
ECHO LVOT VTI: 17.5 CM
ECHO MV A VELOCITY: 0.36 M/S
ECHO MV E DECELERATION TIME (DT): 185 MS
ECHO MV E VELOCITY: 1.1 M/S
ECHO MV E/A RATIO: 3.06
ECHO MV E/E' LATERAL: 18.33
ECHO MV E/E' RATIO (AVERAGED): 20.17
ECHO RV FREE WALL PEAK S': 10 CM/S
EKG ATRIAL RATE: 59 BPM
EKG ATRIAL RATE: 65 BPM
EKG P AXIS: 68 DEGREES
EKG P AXIS: 94 DEGREES
EKG P-R INTERVAL: 194 MS
EKG P-R INTERVAL: 198 MS
EKG Q-T INTERVAL: 482 MS
EKG Q-T INTERVAL: 484 MS
EKG QRS DURATION: 140 MS
EKG QRS DURATION: 140 MS
EKG QTC CALCULATION (BAZETT): 479 MS
EKG QTC CALCULATION (BAZETT): 501 MS
EKG R AXIS: 82 DEGREES
EKG R AXIS: 89 DEGREES
EKG T AXIS: -14 DEGREES
EKG T AXIS: 30 DEGREES
EKG VENTRICULAR RATE: 59 BPM
EKG VENTRICULAR RATE: 65 BPM
GFR SERPL CREATININE-BSD FRML MDRD: 31 ML/MIN/1.73M2
GLUCOSE SERPL-MCNC: 140 MG/DL (ref 70–99)
POTASSIUM SERPL-SCNC: 4.2 MMOL/L (ref 3.7–5.3)
SODIUM SERPL-SCNC: 138 MMOL/L (ref 135–144)
TROPONIN I SERPL HS-MCNC: 81 NG/L (ref 0–22)

## 2024-03-20 PROCEDURE — 97116 GAIT TRAINING THERAPY: CPT

## 2024-03-20 PROCEDURE — 93306 TTE W/DOPPLER COMPLETE: CPT

## 2024-03-20 PROCEDURE — 6360000002 HC RX W HCPCS: Performed by: STUDENT IN AN ORGANIZED HEALTH CARE EDUCATION/TRAINING PROGRAM

## 2024-03-20 PROCEDURE — 97166 OT EVAL MOD COMPLEX 45 MIN: CPT

## 2024-03-20 PROCEDURE — 99232 SBSQ HOSP IP/OBS MODERATE 35: CPT | Performed by: STUDENT IN AN ORGANIZED HEALTH CARE EDUCATION/TRAINING PROGRAM

## 2024-03-20 PROCEDURE — 97535 SELF CARE MNGMENT TRAINING: CPT

## 2024-03-20 PROCEDURE — 6370000000 HC RX 637 (ALT 250 FOR IP): Performed by: STUDENT IN AN ORGANIZED HEALTH CARE EDUCATION/TRAINING PROGRAM

## 2024-03-20 PROCEDURE — 80048 BASIC METABOLIC PNL TOTAL CA: CPT

## 2024-03-20 PROCEDURE — 2060000000 HC ICU INTERMEDIATE R&B

## 2024-03-20 PROCEDURE — 93306 TTE W/DOPPLER COMPLETE: CPT | Performed by: INTERNAL MEDICINE

## 2024-03-20 PROCEDURE — 2580000003 HC RX 258: Performed by: STUDENT IN AN ORGANIZED HEALTH CARE EDUCATION/TRAINING PROGRAM

## 2024-03-20 PROCEDURE — 97162 PT EVAL MOD COMPLEX 30 MIN: CPT

## 2024-03-20 PROCEDURE — 36415 COLL VENOUS BLD VENIPUNCTURE: CPT

## 2024-03-20 PROCEDURE — 84484 ASSAY OF TROPONIN QUANT: CPT

## 2024-03-20 RX ORDER — OXYCODONE HYDROCHLORIDE AND ACETAMINOPHEN 5; 325 MG/1; MG/1
1 TABLET ORAL EVERY 4 HOURS PRN
Status: DISCONTINUED | OUTPATIENT
Start: 2024-03-20 | End: 2024-03-21

## 2024-03-20 RX ADMIN — ESCITALOPRAM OXALATE 10 MG: 10 TABLET ORAL at 09:05

## 2024-03-20 RX ADMIN — ROSUVASTATIN CALCIUM 5 MG: 5 TABLET, FILM COATED ORAL at 09:06

## 2024-03-20 RX ADMIN — SODIUM CHLORIDE: 9 INJECTION, SOLUTION INTRAVENOUS at 02:16

## 2024-03-20 RX ADMIN — OXYCODONE HYDROCHLORIDE AND ACETAMINOPHEN 1 TABLET: 5; 325 TABLET ORAL at 09:07

## 2024-03-20 RX ADMIN — MIDODRINE HYDROCHLORIDE 5 MG: 5 TABLET ORAL at 09:06

## 2024-03-20 RX ADMIN — MIDODRINE HYDROCHLORIDE 5 MG: 5 TABLET ORAL at 11:32

## 2024-03-20 RX ADMIN — HEPARIN SODIUM 5000 UNITS: 5000 INJECTION INTRAVENOUS; SUBCUTANEOUS at 22:00

## 2024-03-20 RX ADMIN — MIDODRINE HYDROCHLORIDE 5 MG: 5 TABLET ORAL at 15:40

## 2024-03-20 RX ADMIN — FERROUS SULFATE TAB EC 325 MG (65 MG FE EQUIVALENT) 325 MG: 325 (65 FE) TABLET DELAYED RESPONSE at 09:06

## 2024-03-20 RX ADMIN — TAMSULOSIN HYDROCHLORIDE 0.4 MG: 0.4 CAPSULE ORAL at 09:05

## 2024-03-20 RX ADMIN — PANTOPRAZOLE SODIUM 40 MG: 40 TABLET, DELAYED RELEASE ORAL at 09:05

## 2024-03-20 RX ADMIN — HEPARIN SODIUM 5000 UNITS: 5000 INJECTION INTRAVENOUS; SUBCUTANEOUS at 14:18

## 2024-03-20 RX ADMIN — SODIUM CHLORIDE: 9 INJECTION, SOLUTION INTRAVENOUS at 16:19

## 2024-03-20 RX ADMIN — LEVOTHYROXINE SODIUM 150 MCG: 75 TABLET ORAL at 07:30

## 2024-03-20 RX ADMIN — ACETAMINOPHEN 650 MG: 325 TABLET ORAL at 10:57

## 2024-03-20 ASSESSMENT — PAIN SCALES - GENERAL
PAINLEVEL_OUTOF10: 3
PAINLEVEL_OUTOF10: 2
PAINLEVEL_OUTOF10: 3
PAINLEVEL_OUTOF10: 5
PAINLEVEL_OUTOF10: 9

## 2024-03-20 ASSESSMENT — PAIN DESCRIPTION - LOCATION
LOCATION: SHOULDER
LOCATION: SHOULDER

## 2024-03-20 ASSESSMENT — PAIN DESCRIPTION - DESCRIPTORS
DESCRIPTORS: DISCOMFORT
DESCRIPTORS: ACHING

## 2024-03-20 ASSESSMENT — PAIN - FUNCTIONAL ASSESSMENT
PAIN_FUNCTIONAL_ASSESSMENT: ACTIVITIES ARE NOT PREVENTED
PAIN_FUNCTIONAL_ASSESSMENT: ACTIVITIES ARE NOT PREVENTED

## 2024-03-20 ASSESSMENT — PAIN DESCRIPTION - ORIENTATION
ORIENTATION: RIGHT
ORIENTATION: RIGHT

## 2024-03-20 NOTE — PLAN OF CARE
Problem: Discharge Planning  Goal: Discharge to home or other facility with appropriate resources  Outcome: Progressing     Problem: Skin/Tissue Integrity  Goal: Absence of new skin breakdown  Description: 1.  Monitor for areas of redness and/or skin breakdown  2.  Assess vascular access sites hourly  3.  Every 4-6 hours minimum:  Change oxygen saturation probe site  4.  Every 4-6 hours:  If on nasal continuous positive airway pressure, respiratory therapy assess nares and determine need for appliance change or resting period.  Outcome: Progressing     Problem: Safety - Adult  Goal: Free from fall injury  Outcome: Progressing     Problem: ABCDS Injury Assessment  Goal: Absence of physical injury  Outcome: Progressing     Problem: Cardiovascular - Adult  Goal: Maintains optimal cardiac output and hemodynamic stability  Outcome: Progressing     Problem: Cardiovascular - Adult  Goal: Absence of cardiac dysrhythmias or at baseline  Outcome: Progressing     Problem: Musculoskeletal - Adult  Goal: Return mobility to safest level of function  Outcome: Progressing     Problem: Musculoskeletal - Adult  Goal: Maintain proper alignment of affected body part  Outcome: Progressing     Problem: Musculoskeletal - Adult  Goal: Return ADL status to a safe level of function  Outcome: Progressing

## 2024-03-20 NOTE — CARE COORDINATION
Case Management Assessment  Initial Evaluation    Date/Time of Evaluation: 3/20/2024 2:43 PM  Assessment Completed by: DANETTE HYMAN CARO    If patient is discharged prior to next notation, then this note serves as note for discharge by case management.    Patient Name: Jareth Paulino                   YOB: 1945  Diagnosis: Elevated troponin [R79.89]  TIERNEY (acute kidney injury) (HCC) [N17.9]                   Date / Time: 3/19/2024  2:37 PM    Patient Admission Status: Inpatient   Readmission Risk (Low < 19, Mod (19-27), High > 27): Readmission Risk Score: 22.4    Current PCP: Zuleika Arreola APRN - CNP  PCP verified by CM? Yes    Chart Reviewed: Yes      History Provided by: Patient  Patient Orientation: Alert and Oriented    Patient Cognition: Alert    Hospitalization in the last 30 days (Readmission):  No    If yes, Readmission Assessment in CM Navigator will be completed.    Advance Directives:      Code Status: DNR-CCA   Patient's Primary Decision Maker is: Named in Scanned ACP Document    Primary Decision Maker: Xin Wayne - Child - [phone]    Discharge Planning:    Patient lives with: Alone Type of Home: House  Primary Care Giver: Self  Patient Support Systems include: Children, Family Members   Current Financial resources: Medicare  Current community resources: None  Current services prior to admission: Durable Medical Equipment, Home Care ([facility])            Current DME: Walker, Wheelchair, Cane            Type of Home Care services:  OT, PT, Nursing Services    ADLS  Prior functional level: Assistance with the following:, Bathing, Dressing, Housework, Cooking  Current functional level: Assistance with the following:, Bathing, Dressing, Cooking, Housework, Mobility    PT AM-PAC: 11 /24  OT AM-PAC: 12 /24    Family can provide assistance at DC: Yes  Would you like Case Management to discuss the discharge plan with any other family members/significant others, and if so, who? Yes  to the following treatment goals of Elevated troponin [R79.89]  TIERNEY (acute kidney injury) (HCC) [N17.9]    IF APPLICABLE: The Patient and/or patient representative Jareth and his family were provided with a choice of provider and agrees with the discharge plan. Freedom of choice list with basic dialogue that supports the patient's individualized plan of care/goals and shares the quality data associated with the providers was provided to:     Patient Representative Name:       The Patient and/or Patient Representative Agree with the Discharge Plan?      DANETTE HYMAN CARO  Case Management Department  Ph: [phone] Fax:

## 2024-03-20 NOTE — PLAN OF CARE
Problem: Discharge Planning  Goal: Discharge to home or other facility with appropriate resources  3/20/2024 1145 by Sherri Pagan  Outcome: Progressing  3/20/2024 1120 by Pamela Aguirre RN  Outcome: Progressing  3/20/2024 0528 by Coco Williamson RN  Outcome: Progressing     Problem: Skin/Tissue Integrity  Goal: Absence of new skin breakdown  Description: 1.  Monitor for areas of redness and/or skin breakdown  2.  Assess vascular access sites hourly  3.  Every 4-6 hours minimum:  Change oxygen saturation probe site  4.  Every 4-6 hours:  If on nasal continuous positive airway pressure, respiratory therapy assess nares and determine need for appliance change or resting period.  3/20/2024 1145 by Sherri Pagan  Outcome: Progressing  3/20/2024 1120 by Pamela Aguirre RN  Outcome: Progressing  3/20/2024 0528 by Coco Williamson RN  Outcome: Progressing     Problem: Safety - Adult  Goal: Free from fall injury  3/20/2024 1145 by Sherri Pagan  Outcome: Progressing  3/20/2024 1120 by Pamela Aguirre RN  Outcome: Progressing  3/20/2024 0528 by Coco Williamson RN  Outcome: Progressing     Problem: ABCDS Injury Assessment  Goal: Absence of physical injury  3/20/2024 1145 by Sherri Pagan  Outcome: Progressing  3/20/2024 1120 by Pamela Aguirre RN  Outcome: Progressing  3/20/2024 0528 by Coco Williamson RN  Outcome: Progressing     Problem: Cardiovascular - Adult  Goal: Maintains optimal cardiac output and hemodynamic stability  3/20/2024 1145 by Sherri Pagan  Outcome: Progressing  3/20/2024 1120 by Pamela Aguirre RN  Outcome: Progressing  3/20/2024 0528 by Coco Williamson RN  Outcome: Progressing  Goal: Absence of cardiac dysrhythmias or at baseline  3/20/2024 1145 by Sherri Pagan  Outcome: Progressing  3/20/2024 1120 by Pamela Aguirre RN  Outcome: Progressing  3/20/2024 0528 by Coco Williamson RN  Outcome: Progressing     Problem: Musculoskeletal - Adult  Goal: Return mobility to  safest level of function  3/20/2024 1145 by Sherri Pagan  Outcome: Progressing  3/20/2024 1120 by Pamela Aguirre RN  Outcome: Progressing  3/20/2024 0528 by Ccoo Williamson RN  Outcome: Progressing  Goal: Maintain proper alignment of affected body part  3/20/2024 1145 by Sherri Pagan  Outcome: Progressing  3/20/2024 1120 by Pamela Aguirre RN  Outcome: Progressing  3/20/2024 0528 by Coco Williamson RN  Outcome: Progressing  Goal: Return ADL status to a safe level of function  3/20/2024 1145 by Sherri Pagan  Outcome: Progressing  3/20/2024 1120 by Pamela Aguirre RN  Outcome: Progressing  3/20/2024 0528 by Coco Williamson RN  Outcome: Progressing

## 2024-03-20 NOTE — PROGRESS NOTES
CMS 0-100% Score: 66.57  ADL Inpatient CMS G-Code Modifier : CL        Goals  Short Term Goals  Time Frame for Short Term Goals: 14 visits  Short Term Goal 1: Pt will complete UB ADLs/grooming with SBA  Short Term Goal 2: Pt will complete LB ADLs/toileting with SBA  Short Term Goal 3: Pt will complete functional mobility/transfers with SBA in prep for ADL completion  Short Term Goal 4: Pt will improve dynamic standing balance to good for increased safety during standing aspects of ADL completion  Patient Goals   Patient goals : stay in bed until all the testing is done       Therapy Time   Individual Concurrent Group Co-treatment   Time In 0833         Time Out 0902         Minutes 29         Timed Code Treatment Minutes: 9 Minutes       AYANA RITCHIE OTR/L

## 2024-03-20 NOTE — PROGRESS NOTES
basement. Laundry on first floor)  Home Access: Stairs to enter with rails  Entrance Stairs - Number of Steps: 2  Entrance Stairs - Rails: Both  Bathroom Shower/Tub: Walk-in shower  Bathroom Toilet: Handicap height  Bathroom Equipment: Shower chair, Grab bars in shower, Grab bars around toilet  Home Equipment: Cane, Wheelchair-manual, Hospital bed  Has the patient had two or more falls in the past year or any fall with injury in the past year?: Yes  Receives Help From: Family, Home health (Home PT 2x/week, Home OT 1x/week, home aid 3x/week for laundry, cleaning. Daughters also assist)  ADL Assistance: Needs assistance (Per pt, assist with showers 3x/week, ind sponge bathing, ind dressing (daughters set up clothes))  Toileting: Independent  Homemaking Assistance: Needs assistance (Ind light meal prep. Aid does laundry, cleaning. Daughters shop)  Ambulation Assistance: Independent (with str cane ~40-50ft. Ind propel w/c with feet)  Transfer Assistance: Independent  Active : No  Occupation: Retired  Type of Occupation: office work  Additional Comments: Pt is right handed         Cognition   Orientation  Overall Orientation Status: Within Functional Limits  Orientation Level: Oriented X4  Cognition  Overall Cognitive Status: Exceptions  Arousal/Alertness: Appropriate responses to stimuli  Following Commands: Follows all commands without difficulty  Attention Span: Attends with cues to redirect  Memory: Appears intact  Safety Judgement: Decreased awareness of need for assistance;Decreased awareness of need for safety  Problem Solving: Assistance required to generate solutions;Assistance required to identify errors made;Assistance required to correct errors made;Decreased awareness of errors;Assistance required to implement solutions  Insights: Decreased awareness of deficits  Initiation: Requires cues for some  Sequencing: Requires cues for some     Objective                 AROM RLE (degrees)  RLE AROM: WFL  AROM  LLE (degrees)  LLE AROM : WFL  Strength RLE  Strength RLE: WNL  Strength LLE  Strength LLE: Exception  L Hip Flexion: 4-/5  L Knee Flexion: 4-/5  L Knee Extension: 4-/5            Bed mobility  Supine to Sit: Minimal assistance (has hospital bed with rails at home)  Scooting: Contact guard assistance    Transfers  Sit to Stand: Moderate Assistance (posterior lean)  Stand to Sit: Moderate Assistance;2 Person Assistance (decrease safety)  Comment: Despite max education on safety, noted pt placing full weight thru RUE during sit to stand. Pt stood at side of bed with mod assist x 2 for balance for pre-gait activity of lateral weightshifting    Ambulation  Surface: Level tile  Device: Single point cane  Assistance: Moderate assistance;2 Person assistance  Quality of Gait: Increase pain RUE, increase anxiety. Posterior lean, weight on heels  Gait Deviations: Shuffles  Distance: 3ft       Balance  Posture: Fair  Sitting - Static: Good;-  Sitting - Dynamic: Fair;-  Standing - Static: Poor;-  Standing - Dynamic: Poor;-           OutComes Score                                                  AM-PAC - Mobility    AM-PAC Basic Mobility - Inpatient   How much help is needed turning from your back to your side while in a flat bed without using bedrails?: A Little  How much help is needed moving from lying on your back to sitting on the side of a flat bed without using bedrails?: A Little  How much help is needed moving to and from a bed to a chair?: Total  How much help is needed standing up from a chair using your arms?: A Lot  How much help is needed walking in hospital room?: Total  How much help is needed climbing 3-5 steps with a railing?: Total  AM-PAC Inpatient Mobility Raw Score : 11  AM-PAC Inpatient T-Scale Score : 33.86  Mobility Inpatient CMS 0-100% Score: 72.57  Mobility Inpatient CMS G-Code Modifier : CL                Goals  Short Term Goals  Time Frame for Short Term Goals: 14 visits  Short Term Goal 1: supine

## 2024-03-20 NOTE — PLAN OF CARE
Problem: Discharge Planning  Goal: Discharge to home or other facility with appropriate resources  3/20/2024 1120 by Pamela Aguirre RN  Outcome: Progressing  3/20/2024 0528 by Coco Williamson RN  Outcome: Progressing     Problem: Skin/Tissue Integrity  Goal: Absence of new skin breakdown  Description: 1.  Monitor for areas of redness and/or skin breakdown  2.  Assess vascular access sites hourly  3.  Every 4-6 hours minimum:  Change oxygen saturation probe site  4.  Every 4-6 hours:  If on nasal continuous positive airway pressure, respiratory therapy assess nares and determine need for appliance change or resting period.  3/20/2024 1120 by Pamela Aguirre RN  Outcome: Progressing  3/20/2024 0528 by Coco Williamson RN  Outcome: Progressing     Problem: Safety - Adult  Goal: Free from fall injury  3/20/2024 1120 by Pamela Aguirre RN  Outcome: Progressing  3/20/2024 0528 by Coco Williamson RN  Outcome: Progressing     Problem: ABCDS Injury Assessment  Goal: Absence of physical injury  3/20/2024 1120 by Pamela Aguirre RN  Outcome: Progressing  3/20/2024 0528 by Coco Williamson RN  Outcome: Progressing     Problem: Cardiovascular - Adult  Goal: Maintains optimal cardiac output and hemodynamic stability  3/20/2024 1120 by Pamela Aguirre RN  Outcome: Progressing  3/20/2024 0528 by Coco Williamson RN  Outcome: Progressing  Goal: Absence of cardiac dysrhythmias or at baseline  3/20/2024 1120 by Pamela Aguirre RN  Outcome: Progressing  3/20/2024 0528 by Coco Williamson RN  Outcome: Progressing     Problem: Musculoskeletal - Adult  Goal: Return mobility to safest level of function  3/20/2024 1120 by Pamela Aguirre RN  Outcome: Progressing  3/20/2024 0528 by Coco Williamson RN  Outcome: Progressing  Goal: Maintain proper alignment of affected body part  3/20/2024 1120 by Pamela Aguirre RN  Outcome: Progressing  3/20/2024 0528 by Coco Williamson RN  Outcome: Progressing  Goal: Return ADL

## 2024-03-20 NOTE — PROGRESS NOTES
[facility]  Office: [phone]  Ebenezer Iyer DO, Ta Curran DO, Matt Beckford DO, Krunal Martinez DO, Natacha Rosario MD, Katy Loomis MD, Jolene Arnett MD, Malka Llamas MD,  Christoph Brooks MD, Carlos Bowie MD, Gabbi Teran MD,  Wang Silver DO, Nenita Guerrero MD, Rashel Ha MD, Theo Iyer DO, Marifer Simmons MD,  Markie So DO, Cathi Hawkins MD, Marjorie Davis MD, Claudette Shen MD, Brian Brown MD,  Robin Andino MD, Kimberley Bowser MD, Con Mitchell MD, Robinson Devries MD, Stewart Harley MD, Erik Tang MD, Hilario Hurtado DO, Ranjeet Smith DO, Alyse Phillips MD,  Parish Dale MD, Shirley Waterhouse, CNP,  Michelle Fischer CNP, Melo Ram, CNP,  Aparna Cardoso, DNP, Zara Haywood, CNP, Mary Jane Rice, CNP, Batool Burns CNP, Carmelita Mckeon CNP, Vale Barakat, CNP, Bryanna Ibarra, PA-C, Bertha Blakely, PA-C, Sue Brady, CNP, Belia Argueta, CNP, Meme Spangler, CNP, Raysa Terrell, CNS, Tami Clemens, CNP, Brenda Stokes CNP, Tracy Schwab, CNP         [facility]   IN-PATIENT SERVICE   [facility]    Progress Note    3/20/2024    8:27 AM    Name:   Jareth Paulino  MRN:     [id number]     Acct:      [id number]   Room:   326/326-01   Day:  1  Admit Date:  3/19/2024  2:37 PM    PCP:   Zuleika Arreola APRN - CNP  Code Status:  DNR-CCA    Subjective:     Patient was seen and examined at bedside this AM. He reports feeling much better and is without complaint this morning. Renal function is improving with fluids. Plan to continue current management with anticipated discharge home tomorrow AM pending continued clinical improvement.     Medications:     Allergies:  No Known Allergies    Current Meds:   Scheduled Meds:    escitalopram  10 mg Oral Daily    ferrous sulfate  325 mg Oral Daily with breakfast    levothyroxine  150 mcg Oral Daily    midodrine  5 mg Oral TID WC    pantoprazole  40 mg Oral Daily    rosuvastatin  5 mg Oral Daily     hypertension 3/19/2024 Yes    PAF (paroxysmal atrial fibrillation) (HCC) 3/19/2024 Yes    Chronic diastolic congestive heart failure (HCC) 3/19/2024 Yes    Secondary hypercoagulable state (HCC) 3/19/2024 Yes    Severe obesity (BMI 35.0-39.9) with comorbidity (HCC) 3/19/2024 Yes    Stage 3 chronic kidney disease (HCC) 3/19/2024 Yes       Plan:     TIERNEY on CKD   -Likely secondary to diuresis   -Continue maintenance fluids at 50 mL/hr   -Daily BMP   -Strict I&O's   -Renal function is improving      Elevated troponin   -Trending down   -Secondary to TIERNEY as above      Right humerus fracture   -The patient follows with Dr. Guerrier who has recommended non-operative management      Paroxsymal atrial fibrillation   -Continue home metoprolol   -Continue home Eliquis      HFpEF   -Not currently in acute exacerbation   -Holding home Bumex 2/2 TIERNEY as above      Hypothyroidism   -Continue home levothyroxine      Rashel Ha MD  3/20/2024  8:27 AM

## 2024-03-21 LAB
ANION GAP SERPL CALCULATED.3IONS-SCNC: 9 MMOL/L (ref 9–17)
BUN SERPL-MCNC: 47 MG/DL (ref 8–23)
CALCIUM SERPL-MCNC: 9.1 MG/DL (ref 8.6–10.4)
CHLORIDE SERPL-SCNC: 103 MMOL/L (ref 98–107)
CO2 SERPL-SCNC: 23 MMOL/L (ref 20–31)
CREAT SERPL-MCNC: 1.4 MG/DL (ref 0.7–1.2)
GFR SERPL CREATININE-BSD FRML MDRD: 51 ML/MIN/1.73M2
GLUCOSE SERPL-MCNC: 86 MG/DL (ref 70–99)
POTASSIUM SERPL-SCNC: 4.2 MMOL/L (ref 3.7–5.3)
SODIUM SERPL-SCNC: 135 MMOL/L (ref 135–144)

## 2024-03-21 PROCEDURE — 99232 SBSQ HOSP IP/OBS MODERATE 35: CPT | Performed by: STUDENT IN AN ORGANIZED HEALTH CARE EDUCATION/TRAINING PROGRAM

## 2024-03-21 PROCEDURE — 2060000000 HC ICU INTERMEDIATE R&B

## 2024-03-21 PROCEDURE — 2580000003 HC RX 258: Performed by: STUDENT IN AN ORGANIZED HEALTH CARE EDUCATION/TRAINING PROGRAM

## 2024-03-21 PROCEDURE — 6370000000 HC RX 637 (ALT 250 FOR IP): Performed by: STUDENT IN AN ORGANIZED HEALTH CARE EDUCATION/TRAINING PROGRAM

## 2024-03-21 PROCEDURE — 97530 THERAPEUTIC ACTIVITIES: CPT

## 2024-03-21 PROCEDURE — 6360000002 HC RX W HCPCS: Performed by: STUDENT IN AN ORGANIZED HEALTH CARE EDUCATION/TRAINING PROGRAM

## 2024-03-21 PROCEDURE — 97535 SELF CARE MNGMENT TRAINING: CPT

## 2024-03-21 PROCEDURE — 80048 BASIC METABOLIC PNL TOTAL CA: CPT

## 2024-03-21 PROCEDURE — 36415 COLL VENOUS BLD VENIPUNCTURE: CPT

## 2024-03-21 RX ADMIN — TAMSULOSIN HYDROCHLORIDE 0.4 MG: 0.4 CAPSULE ORAL at 10:24

## 2024-03-21 RX ADMIN — ROSUVASTATIN CALCIUM 5 MG: 5 TABLET, FILM COATED ORAL at 10:25

## 2024-03-21 RX ADMIN — HEPARIN SODIUM 5000 UNITS: 5000 INJECTION INTRAVENOUS; SUBCUTANEOUS at 06:15

## 2024-03-21 RX ADMIN — MIDODRINE HYDROCHLORIDE 5 MG: 5 TABLET ORAL at 13:21

## 2024-03-21 RX ADMIN — SODIUM CHLORIDE: 9 INJECTION, SOLUTION INTRAVENOUS at 13:18

## 2024-03-21 RX ADMIN — SODIUM CHLORIDE, PRESERVATIVE FREE 10 ML: 5 INJECTION INTRAVENOUS at 20:17

## 2024-03-21 RX ADMIN — HEPARIN SODIUM 5000 UNITS: 5000 INJECTION INTRAVENOUS; SUBCUTANEOUS at 20:17

## 2024-03-21 RX ADMIN — PANTOPRAZOLE SODIUM 40 MG: 40 TABLET, DELAYED RELEASE ORAL at 10:25

## 2024-03-21 RX ADMIN — HEPARIN SODIUM 5000 UNITS: 5000 INJECTION INTRAVENOUS; SUBCUTANEOUS at 13:21

## 2024-03-21 RX ADMIN — LEVOTHYROXINE SODIUM 150 MCG: 75 TABLET ORAL at 06:16

## 2024-03-21 RX ADMIN — MIDODRINE HYDROCHLORIDE 5 MG: 5 TABLET ORAL at 18:01

## 2024-03-21 RX ADMIN — FERROUS SULFATE TAB EC 325 MG (65 MG FE EQUIVALENT) 325 MG: 325 (65 FE) TABLET DELAYED RESPONSE at 10:25

## 2024-03-21 RX ADMIN — ESCITALOPRAM OXALATE 10 MG: 10 TABLET ORAL at 10:25

## 2024-03-21 ASSESSMENT — PAIN SCALES - GENERAL: PAINLEVEL_OUTOF10: 0

## 2024-03-21 NOTE — PROGRESS NOTES
Comprehensive Nutrition Assessment    Type and Reason for Visit:  Initial, Positive Nutrition Screen    Nutrition Recommendations/Plan:   Provide diet rx as ordered   Provide supplements as ordered   Monitor labs as they become available   Monitor skin integrity/weights     Malnutrition Assessment:  Malnutrition Status:  No malnutrition (03/21/24 1242)    Context:        Findings of the 6 clinical characteristics of malnutrition:  Energy Intake:     Weight Loss:        Body Fat Loss:        Muscle Mass Loss:       Fluid Accumulation:        Strength:       Nutrition Assessment:    emergency department on 3/19/2024 complaining of fatigue and frequent falls at home. The patient states that he was just released from [facility] a few weeks ago and states that he has felt increasingly weak since then. He recently had his Bumex increased to 2 mg daily and says that he has lost quite a bit of weight since then. weight on 3/20 was 236#, weight history 3/19 was 244#, 1/17 was 262#, patient has a history of CHF and weight loss is likely related to diuretics in use as previously noed on last admisson. Physician notes gentle hydration and will likely discharge tomorrow. Is on ensure enlive three times daily providing 1050 kcals and 60 grams of protein if all three are 100% consumed. Monitor po intakes, weights, labs, skin integrity, supplement acceptance and follow up PRN.    Nutrition Related Findings:    Edema to BLE +1 non-pitting, active sounds, eating % of meals, active sounds. BM 3/20. Wound Type: None       Current Nutrition Intake & Therapies:    Average Meal Intake: %  Average Supplements Intake: 51-75%  ADULT DIET; Regular  ADULT ORAL NUTRITION SUPPLEMENT; Breakfast, Lunch, Dinner; Standard High Calorie/High Protein Oral Supplement    Anthropometric Measures:  Height: 180.3 cm (5' 11\")  Ideal Body Weight (IBW): 172 lbs (78 kg)       Current Body Weight: 107 kg (236 lb), 137.2 % IBW. Weight  Source: Bed Scale  Current BMI (kg/m2): 32.9        Weight Adjustment For: No Adjustment                 BMI Categories: Obese Class 1 (BMI 30.0-34.9)    Estimated Daily Nutrient Needs:  Energy Requirements Based On: Kcal/kg  Weight Used for Energy Requirements: Current  Energy (kcal/day): 6171-1986 kcals per day usin 20-25 g/kg of actual body weight  Weight Used for Protein Requirements: Ideal  Protein (g/day):  grams per day using 1.2-.1.3 g/kg of ideal body weight.  Method Used for Fluid Requirements: 1 ml/kcal  Fluid (ml/day): 0751-0958 ml per day using 25-30 ml/kg    Nutrition Diagnosis:   Inadequate oral intake related to acute injury/trauma as evidenced by poor intake prior to admission    Nutrition Interventions:   Food and/or Nutrient Delivery: Continue Current Diet, Continue Oral Nutrition Supplement  Nutrition Education/Counseling: Education not indicated  Coordination of Nutrition Care: Continue to monitor while inpatient       Goals:  Previous Goal Met: Progressing toward Goal(s)  Goals: PO intake 75% or greater       Nutrition Monitoring and Evaluation:   Behavioral-Environmental Outcomes: None Identified  Food/Nutrient Intake Outcomes: Supplement Intake, Food and Nutrient Intake  Physical Signs/Symptoms Outcomes: Chewing or Swallowing, Fluid Status or Edema, Skin, Weight, Nausea or Vomiting, Hemodynamic Status    Discharge Planning:    Continue Oral Nutrition Supplement, Continue current diet     Keon Mcclelland RD  Contact: [phone]

## 2024-03-21 NOTE — PLAN OF CARE
Problem: Discharge Planning  Goal: Discharge to home or other facility with appropriate resources  Outcome: Progressing  Flowsheets (Taken 3/21/2024 0800)  Discharge to home or other facility with appropriate resources:   Identify barriers to discharge with patient and caregiver   Arrange for needed discharge resources and transportation as appropriate   Identify discharge learning needs (meds, wound care, etc)   Arrange for interpreters to assist at discharge as needed   Refer to discharge planning if patient needs post-hospital services based on physician order or complex needs related to functional status, cognitive ability or social support system     Problem: Skin/Tissue Integrity  Goal: Absence of new skin breakdown  Description: 1.  Monitor for areas of redness and/or skin breakdown  2.  Assess vascular access sites hourly  3.  Every 4-6 hours minimum:  Change oxygen saturation probe site  4.  Every 4-6 hours:  If on nasal continuous positive airway pressure, respiratory therapy assess nares and determine need for appliance change or resting period.  Outcome: Progressing     Problem: Safety - Adult  Goal: Free from fall injury  Outcome: Progressing  Fall assessment preformed. Bed in low locked position with call light and tray table within reach. Chair alarm on, patient educated on use of call light. Education given. Will continue to monitor.      Problem: ABCDS Injury Assessment  Goal: Absence of physical injury  Outcome: Progressing     Problem: Cardiovascular - Adult  Goal: Maintains optimal cardiac output and hemodynamic stability  Outcome: Progressing  Flowsheets (Taken 3/21/2024 0800)  Maintains optimal cardiac output and hemodynamic stability:   Monitor blood pressure and heart rate   Monitor urine output and notify Licensed Independent Practitioner for values outside of normal range   Assess for signs of decreased cardiac output   Administer fluid and/or volume expanders as ordered   Administer  vasoactive medications as ordered  Goal: Absence of cardiac dysrhythmias or at baseline  Outcome: Progressing     Problem: Musculoskeletal - Adult  Goal: Return mobility to safest level of function  Outcome: Progressing  Flowsheets (Taken 3/21/2024 0800)  Return Mobility to Safest Level of Function:   Assess patient stability and activity tolerance for standing, transferring and ambulating with or without assistive devices   Assist with transfers and ambulation using safe patient handling equipment as needed   Ensure adequate protection for wounds/incisions during mobilization   Obtain physical therapy/occupational therapy consults as needed   Apply continuous passive motion per provider or physical therapy orders to increase flexion toward goal   Instruct patient/family in ordered activity level  Goal: Maintain proper alignment of affected body part  Outcome: Progressing  Flowsheets (Taken 3/21/2024 0800)  Maintain proper alignment of affected body part:   Instruct and reinforce with patient and family use of appropriate assistive device and precautions (e.g. spinal or hip dislocation precautions)   Support and protect limb and body alignment per provider's orders  Goal: Return ADL status to a safe level of function  Outcome: Progressing  Flowsheets (Taken 3/21/2024 0800)  Return ADL Status to a Safe Level of Function:   Assist and instruct patient to increase activity and self care as tolerated   Administer medication as ordered   Assess activities of daily living deficits and provide assistive devices as needed   Obtain physical therapy/occupational therapy consults as needed     Problem: Nutrition Deficit:  Goal: Optimize nutritional status  Outcome: Progressing

## 2024-03-21 NOTE — PLAN OF CARE
Problem: Discharge Planning  Goal: Discharge to home or other facility with appropriate resources  3/21/2024 0105 by Orin Campbell RN  Outcome: Progressing  Flowsheets (Taken 3/20/2024 1900)  Discharge to home or other facility with appropriate resources:   Identify barriers to discharge with patient and caregiver   Arrange for needed discharge resources and transportation as appropriate   Identify discharge learning needs (meds, wound care, etc)   Refer to discharge planning if patient needs post-hospital services based on physician order or complex needs related to functional status, cognitive ability or social support system     Problem: Skin/Tissue Integrity  Goal: Absence of new skin breakdown  Description: 1.  Monitor for areas of redness and/or skin breakdown  2.  Assess vascular access sites hourly  3.  Every 4-6 hours minimum:  Change oxygen saturation probe site  4.  Every 4-6 hours:  If on nasal continuous positive airway pressure, respiratory therapy assess nares and determine need for appliance change or resting period.  3/21/2024 0105 by Orin Campbell RN  Outcome: Progressing     Problem: Safety - Adult  Goal: Free from fall injury  3/21/2024 0105 by Orin Campbell RN  Outcome: Progressing  Flowsheets (Taken 3/21/2024 0105)  Free From Fall Injury: Instruct family/caregiver on patient safety     Problem: ABCDS Injury Assessment  Goal: Absence of physical injury  3/21/2024 0105 by Orin Campbell RN  Outcome: Progressing  Flowsheets (Taken 3/21/2024 0105)  Absence of Physical Injury: Implement safety measures based on patient assessment     Problem: Cardiovascular - Adult  Goal: Maintains optimal cardiac output and hemodynamic stability  3/21/2024 0105 by Orin Campbell RN  Outcome: Progressing  Flowsheets (Taken 3/20/2024 1900)  Maintains optimal cardiac output and hemodynamic stability:   Monitor blood pressure and heart rate   Monitor urine output and notify Licensed Independent

## 2024-03-21 NOTE — PROGRESS NOTES
[facility]  Office: [phone]  Ebenezer Iyer DO, Ta Curran DO, Matt Beckford DO, Krunal Martinez DO, Natacha Rosario MD, Katy Loomis MD, Jolene Arnett MD, Malka Llamas MD,  Christoph Brooks MD, Carlos Bowie MD, Gabbi Teran MD,  Wang Silver DO, Nenita Guerrero MD, Rashel Ha MD, Theo Iyer DO, Marifer Simmons MD,  Markie So DO, Cathi Hawkins MD, Marjorie Davis MD, Claudette Shen MD, Brian Brown MD,  Robin Andino MD, Kimberley Bowser MD, Con Mitchell MD, Robinson Devries MD, Stewart Harley MD, Erik Tang MD, Hilario Hurtado DO, Ranjeet Smith DO, Alyse Phillips MD,  Parish Dale MD, Shirley Waterhouse, CNP,  Michelle Fischer CNP, Melo Ram, CNP,  Aparna Cardoso, DNP, Zara Haywood, CNP, Mary Jane Rice, CNP, Batool Burns CNP, Carmelita Mckeon CNP, Vale Barakat, CNP, Bryanna Ibarra, PA-C, Bertha Blakely PA-C, Sue Brady, CNP, Belia Argueta, CNP, Meme Spangler, CNP, Raysa Terrell, CNS, Tami Clemens CNP, Brenda Stokes CNP, Tracy Schwab, CNP         [facility]   IN-PATIENT SERVICE   [facility]    Progress Note    3/21/2024    6:53 AM    Name:   Jareth Paulino  MRN:     [id number]     Acct:      [id number]   Room:   326/326-01   Day:  2  Admit Date:  3/19/2024  2:37 PM    PCP:   Zuleika Arreola APRN - CNP  Code Status:  DNR-CCA    Subjective:     Patient was seen and examined at bedside this AM. He reports feeling much better and has no specific complaints this morning. BMP is pending at this time. Plan to continue gentle hydration for an additional 24-hours with anticipated discharge home tomorrow AM pending continued clinical improvement.     Medications:     Allergies:  No Known Allergies    Current Meds:   Scheduled Meds:    escitalopram  10 mg Oral Daily    ferrous sulfate  325 mg Oral Daily with breakfast    levothyroxine  150 mcg Oral Daily    midodrine  5 mg Oral TID WC    pantoprazole  40 mg Oral Daily    rosuvastatin  5  PM       Radiology:  XR HUMERUS RIGHT (MIN 2 VIEWS)    Result Date: 3/19/2024  Recurrent impacted mildly comminuted fracture of the right humeral head and neck.     XR CHEST PORTABLE    Result Date: 3/19/2024  No acute cardiopulmonary abnormalities identified.     CT HEAD WO CONTRAST    Result Date: 3/19/2024  Patient motion artifact degrading image resolution Mild central and cortical cerebral atrophy. Mild chronic deep white matter ischemic changes No acute intracranial abnormalities are noted. Multilevel cervical spondylosis and degenerative disc disease. Evidence of paracervical spasm. Grade 1 spondylolisthesis of C2 on C3, possibly chronic. RECOMMENDATIONS: Further evaluation of the cervical spine should be obtained with MR imaging if clinically indicated.     CT CERVICAL SPINE WO CONTRAST    Result Date: 3/19/2024  Patient motion artifact degrading image resolution Mild central and cortical cerebral atrophy. Mild chronic deep white matter ischemic changes No acute intracranial abnormalities are noted. Multilevel cervical spondylosis and degenerative disc disease. Evidence of paracervical spasm. Grade 1 spondylolisthesis of C2 on C3, possibly chronic. RECOMMENDATIONS: Further evaluation of the cervical spine should be obtained with MR imaging if clinically indicated.       Physical Examination:     General appearance:  alert, cooperative and no distress  Mental Status:  oriented to person, place and time and normal affect  Lungs:  clear to auscultation bilaterally, normal effort  Heart:  regular rate and rhythm, no murmur  Abdomen:  soft, nontender, nondistended, normal bowel sounds, no masses, hepatomegaly, splenomegaly  Extremities:  Trace edema appreciated in the bilateral lower extremities.   Skin:  no gross lesions, rashes, induration    Assessment:     Hospital Problems             Last Modified POA    * (Principal) TIERNEY (acute kidney injury) (HCC) 3/19/2024 Yes    Hyperlipidemia 3/19/2024 Yes    Essential

## 2024-03-21 NOTE — PROGRESS NOTES
Physical Therapy  Facility/Department: [address]  Physical Therapy Daily Documentation Note    Name: Jareth Paulino  : 1945  MRN: [id number]  Date of Service: 3/21/2024    Discharge Recommendations:  Patient would benefit from continued therapy after discharge   PT Equipment Recommendations  Equipment Needed: No      Patient Diagnosis(es): The primary encounter diagnosis was TIERNEY (acute kidney injury) ([facility]). A diagnosis of Elevated troponin was also pertinent to this visit.  Past Medical History:  has a past medical history of A-fib (HCC), Anxiety, Arrhythmia, CHF (congestive heart failure) ([facility]), Depression, Hyperlipidemia, Hypertension, Obesity, Pleural effusion, Pleural effusion on left, Recurrent pleural effusion, and Status post total hip replacement, left.  Past Surgical History:  has a past surgical history that includes joint replacement (Left); Tonsillectomy; and Atrial ablation surgery ().    Assessment   Body Structures, Functions, Activity Limitations Requiring Skilled Therapeutic Intervention: Decreased functional mobility ;Decreased safe awareness;Decreased strength;Decreased posture;Decreased endurance  Assessment: Pt presents from home alone after fall while transferring bed to w/c. Pt recently discharged from SNF 3 weeks ago after right humeral fx (non operative) in 2024. Per pt, last 3 weeks at home, he was able to ambulate short distances with str cane and ind propelling w/c. Pt currently requiring min assist for hospital bed mobility, pt variable with transfers sit to stand and stand pivot transfers requiring anywhere from assist of 1 to modassist x 2 for transfer, pt prior ambulated 3ft with str cane and mod x 2 due to decrease balance/posterior lean/anxiety. [facility] clarified. Pt currently does not demonstrate adequate safety for return to prior living situation. Pt will benefit from continued skilled PT for strengthening, balance, safety and functional  notified  Transfers  Sit to Stand: Contact guard assistance (bed elevated; L bedrail)  Stand to Sit: Minimal Assistance (at recliner; sat without control)  Bed to Chair: Moderate assistance (max cues bed to recliner Left)  Stand Pivot Transfers: Contact guard assistance;Minimal Assistance (mod cues bed to/from transport chair at foot of bed)  Lateral Transfers: Stand by assistance (bed elevated and L bedrail required otherwise poor ability to elevate and scoot bottom to HOB)  Comment: stood at bedside with bed elevated and L bedrail with min A for 1-2 minutes and dependent of OT for urinal use; LE shaking while stand and pt anxious; then, bed to recliner before transport chair transfers at bed then into bathroom with grab bar and elevated toilet with handrails mod Ax2 and max cues to rise with pt severe posterior leaning; final stand pivot transfers transport chair to recliner with min/mod Ax2 with max cues as gradual increase anxiety during treatment  Ambulation  Assistance: Unable to assess  Comments: focus of treatment on stand pivot transfers due to pt NW[facility] R UE and decreased standing balance and need to assess for safety and balance for w/c level mobility option                                                              AM-PAC - Mobility    AM-PAC Basic Mobility - Inpatient   How much help is needed turning from your back to your side while in a flat bed without using bedrails?: A Lot  How much help is needed moving from lying on your back to sitting on the side of a flat bed without using bedrails?: A Lot  How much help is needed moving to and from a bed to a chair?: A Lot  How much help is needed standing up from a chair using your arms?: Total  How much help is needed walking in hospital room?: Total  How much help is needed climbing 3-5 steps with a railing?: Total  AM-PAC Inpatient Mobility Raw Score : 9  AM-PAC Inpatient T-Scale Score : 30.55  Mobility Inpatient CMS 0-100% Score: 81.38  Mobility

## 2024-03-22 VITALS
OXYGEN SATURATION: 97 % | BODY MASS INDEX: 33.04 KG/M2 | RESPIRATION RATE: 17 BRPM | DIASTOLIC BLOOD PRESSURE: 60 MMHG | HEIGHT: 71 IN | SYSTOLIC BLOOD PRESSURE: 121 MMHG | TEMPERATURE: 97.7 F | WEIGHT: 236 LBS | HEART RATE: 84 BPM

## 2024-03-22 LAB
ANION GAP SERPL CALCULATED.3IONS-SCNC: 7 MMOL/L (ref 9–17)
BILIRUB UR QL STRIP: NEGATIVE
BUN SERPL-MCNC: 42 MG/DL (ref 8–23)
CALCIUM SERPL-MCNC: 8.7 MG/DL (ref 8.6–10.4)
CHLORIDE SERPL-SCNC: 102 MMOL/L (ref 98–107)
CLARITY UR: CLEAR
CO2 SERPL-SCNC: 23 MMOL/L (ref 20–31)
COLOR UR: YELLOW
COMMENT: NORMAL
CREAT SERPL-MCNC: 1.3 MG/DL (ref 0.7–1.2)
GFR SERPL CREATININE-BSD FRML MDRD: 56 ML/MIN/1.73M2
GLUCOSE SERPL-MCNC: 82 MG/DL (ref 70–99)
GLUCOSE UR STRIP-MCNC: NEGATIVE MG/DL
HGB UR QL STRIP.AUTO: NEGATIVE
KETONES UR STRIP-MCNC: NEGATIVE MG/DL
LEUKOCYTE ESTERASE UR QL STRIP: NEGATIVE
NITRITE UR QL STRIP: NEGATIVE
PH UR STRIP: 6 [PH] (ref 5–8)
POTASSIUM SERPL-SCNC: 4.4 MMOL/L (ref 3.7–5.3)
PROT UR STRIP-MCNC: NEGATIVE MG/DL
SODIUM SERPL-SCNC: 132 MMOL/L (ref 135–144)
SP GR UR STRIP: 1.02 (ref 1–1.03)
UROBILINOGEN UR STRIP-ACNC: NORMAL EU/DL (ref 0–1)

## 2024-03-22 PROCEDURE — 36415 COLL VENOUS BLD VENIPUNCTURE: CPT

## 2024-03-22 PROCEDURE — 2580000003 HC RX 258: Performed by: STUDENT IN AN ORGANIZED HEALTH CARE EDUCATION/TRAINING PROGRAM

## 2024-03-22 PROCEDURE — 6360000002 HC RX W HCPCS: Performed by: STUDENT IN AN ORGANIZED HEALTH CARE EDUCATION/TRAINING PROGRAM

## 2024-03-22 PROCEDURE — 81003 URINALYSIS AUTO W/O SCOPE: CPT

## 2024-03-22 PROCEDURE — 80048 BASIC METABOLIC PNL TOTAL CA: CPT

## 2024-03-22 PROCEDURE — 6370000000 HC RX 637 (ALT 250 FOR IP): Performed by: STUDENT IN AN ORGANIZED HEALTH CARE EDUCATION/TRAINING PROGRAM

## 2024-03-22 PROCEDURE — 99238 HOSP IP/OBS DSCHRG MGMT 30/<: CPT | Performed by: STUDENT IN AN ORGANIZED HEALTH CARE EDUCATION/TRAINING PROGRAM

## 2024-03-22 RX ORDER — BUMETANIDE 2 MG/1
1 TABLET ORAL DAILY
Qty: 30 TABLET | Refills: 3 | Status: SHIPPED | OUTPATIENT
Start: 2024-03-22 | End: 2024-03-22 | Stop reason: HOSPADM

## 2024-03-22 RX ORDER — BUPROPION HYDROCHLORIDE 150 MG/1
150 TABLET ORAL EVERY MORNING
Qty: 30 TABLET | Refills: 3 | Status: SHIPPED | OUTPATIENT
Start: 2024-03-22

## 2024-03-22 RX ORDER — BUMETANIDE 0.5 MG/1
0.5 TABLET ORAL DAILY
Qty: 30 TABLET | Refills: 3 | Status: SHIPPED | OUTPATIENT
Start: 2024-03-22

## 2024-03-22 RX ADMIN — ROSUVASTATIN CALCIUM 5 MG: 5 TABLET, FILM COATED ORAL at 08:50

## 2024-03-22 RX ADMIN — MIDODRINE HYDROCHLORIDE 5 MG: 5 TABLET ORAL at 08:50

## 2024-03-22 RX ADMIN — PANTOPRAZOLE SODIUM 40 MG: 40 TABLET, DELAYED RELEASE ORAL at 08:50

## 2024-03-22 RX ADMIN — HEPARIN SODIUM 5000 UNITS: 5000 INJECTION INTRAVENOUS; SUBCUTANEOUS at 06:32

## 2024-03-22 RX ADMIN — FERROUS SULFATE TAB EC 325 MG (65 MG FE EQUIVALENT) 325 MG: 325 (65 FE) TABLET DELAYED RESPONSE at 08:50

## 2024-03-22 RX ADMIN — ESCITALOPRAM OXALATE 10 MG: 10 TABLET ORAL at 08:50

## 2024-03-22 RX ADMIN — LEVOTHYROXINE SODIUM 150 MCG: 75 TABLET ORAL at 06:32

## 2024-03-22 RX ADMIN — SODIUM CHLORIDE, PRESERVATIVE FREE 10 ML: 5 INJECTION INTRAVENOUS at 08:50

## 2024-03-22 RX ADMIN — MIDODRINE HYDROCHLORIDE 5 MG: 5 TABLET ORAL at 12:49

## 2024-03-22 RX ADMIN — TAMSULOSIN HYDROCHLORIDE 0.4 MG: 0.4 CAPSULE ORAL at 08:50

## 2024-03-22 NOTE — DISCHARGE SUMMARY
[facility]  Office: [phone]  Ebenezer Iyer DO, Ta Curran DO, Matt Beckford DO, Krunal Martinez DO, Natacha Rosario MD, Katy Loomis MD, Jolene Arnett MD, Malka Llamas MD,  Christoph Brooks MD, Carlos Bowie MD, Gabbi Teran MD,  Wang Silver DO, Nenita Guerrero MD, Rashel Ha MD, Theo Iyer DO, Marifer Simmons MD,  Markie So DO, Cathi Hawkins MD, Marjorie Davis MD, Claudette Shen MD, Brian Brown MD,  Robin Andino MD, Kimberley Bowser MD, Con Mitchell MD, Robinson Devries MD, Stewart Harely MD, Erik Tang MD, Hilario Hurtado DO, Ranjeet Smith DO, Alyse Phillips MD,  Parish Dale MD, Shirley Waterhouse, CNP,  Michelle Fischer CNP, Melo Ram, CNP,  Aparna Cardoso, DNP, Zara Haywood, CNP, Mary Jane Rice, CNP, Batool Burns CNP, Carmelita Mckeon CNP, Vale Barakat, CNP, Bryanna Ibarra, PA-C, Bertha Blakely, PA-C, Sue Brady, CNP, Belia Argueta, CNP, Meme Spangler, CNP, Raysa Terrell, CNS, Tami Clemens, CNP, Brenda Stokes, CNP, Tracy Schwab, CNP         [facility]   IN-PATIENT SERVICE   [facility]    Discharge Summary     Patient ID: Jareth Paulino  :  1945   MRN: [id number]     ACCOUNT:  [id number]   Patient's PCP: Zuleika Arreola APRN - CNP  Admit Date: 3/19/2024   Discharge Date: 3/22/2024  Length of Stay: 3  Code Status:  DNR-CCA  Admitting Physician: Rashel Ha MD  Discharge Physician: Rashel Ha MD     Active Discharge Diagnoses:     Hospital Problem Lists:  Principal Problem:    TIERNEY (acute kidney injury) (HCC)  Active Problems:    Hyperlipidemia    Essential hypertension    PAF (paroxysmal atrial fibrillation) (HCC)    Chronic diastolic congestive heart failure (HCC)    Secondary hypercoagulable state (HCC)    Severe obesity (BMI 35.0-39.9) with comorbidity (HCC)    Stage 3 chronic kidney disease (HCC)  Resolved Problems:    * No resolved hospital problems. *      Admission Condition:  fair     Discharged

## 2024-03-22 NOTE — PLAN OF CARE
Problem: Discharge Planning  Goal: Discharge to home or other facility with appropriate resources  Outcome: Adequate for Discharge     Problem: Skin/Tissue Integrity  Goal: Absence of new skin breakdown  Description: 1.  Monitor for areas of redness and/or skin breakdown  2.  Assess vascular access sites hourly  3.  Every 4-6 hours minimum:  Change oxygen saturation probe site  4.  Every 4-6 hours:  If on nasal continuous positive airway pressure, respiratory therapy assess nares and determine need for appliance change or resting period.  Outcome: Adequate for Discharge     Problem: Safety - Adult  Goal: Free from fall injury  Outcome: Adequate for Discharge     Problem: ABCDS Injury Assessment  Goal: Absence of physical injury  Outcome: Adequate for Discharge     Problem: Cardiovascular - Adult  Goal: Maintains optimal cardiac output and hemodynamic stability  Outcome: Adequate for Discharge  Goal: Absence of cardiac dysrhythmias or at baseline  Outcome: Adequate for Discharge     Problem: Musculoskeletal - Adult  Goal: Return mobility to safest level of function  Outcome: Adequate for Discharge  Goal: Maintain proper alignment of affected body part  Outcome: Adequate for Discharge  Goal: Return ADL status to a safe level of function  Outcome: Adequate for Discharge     Problem: Nutrition Deficit:  Goal: Optimize nutritional status  Outcome: Adequate for Discharge

## 2024-03-22 NOTE — RT PROTOCOL NOTE
RT Inhaler-Nebulizer Bronchodilator Protocol Note    There is a bronchodilator order in the chart from a provider indicating to follow the RT Bronchodilator Protocol and there is an “Initiate RT Inhaler-Nebulizer Bronchodilator Protocol” order as well (see protocol at bottom of note).    CXR Findings:  No results found.    The findings from the last RT Protocol Assessment were as follows:   History Pulmonary Disease: None or smoker <15 pack years  Respiratory Pattern: Regular pattern and RR 12-20 bpm  Breath Sounds: Clear breath sounds  Cough: Strong, spontaneous, non-productive  Indication for Bronchodilator Therapy: None  Bronchodilator Assessment Score: 0    Aerosolized bronchodilator medication orders have been revised according to the RT Inhaler-Nebulizer Bronchodilator Protocol below.    Respiratory Therapist to perform RT Therapy Protocol Assessment initially then follow the protocol.  Repeat RT Therapy Protocol Assessment PRN for score 0-3 or on second treatment, BID, and PRN for scores above 3.    No Indications - adjust the frequency to every 6 hours PRN wheezing or bronchospasm, if no treatments needed after 48 hours then discontinue using Per Protocol order mode.     If indication present, adjust the RT bronchodilator orders based on the Bronchodilator Assessment Score as indicated below.  Use Inhaler orders unless patient has one or more of the following: on home nebulizer, not able to hold breath for 10 seconds, is not alert and oriented, cannot activate and use MDI correctly, or respiratory rate 25 breaths per minute or more, then use the equivalent nebulizer order(s) with same Frequency and PRN reasons based on the score.  If a patient is on this medication at home then do not decrease Frequency below that used at home.    0-3 - enter or revise RT bronchodilator order(s) to equivalent RT Bronchodilator order with Frequency of every 4 hours PRN for wheezing or increased work of breathing using Per

## 2024-03-22 NOTE — PLAN OF CARE
Problem: Discharge Planning  Goal: Discharge to home or other facility with appropriate resources  3/21/2024 2218 by Brittney Chu RN  Outcome: Progressing  Flowsheets (Taken 3/21/2024 2218)  Discharge to home or other facility with appropriate resources:   Identify barriers to discharge with patient and caregiver   Arrange for needed discharge resources and transportation as appropriate   Identify discharge learning needs (meds, wound care, etc)     Problem: Skin/Tissue Integrity  Goal: Absence of new skin breakdown  Description: 1.  Monitor for areas of redness and/or skin breakdown  2.  Assess vascular access sites hourly  3.  Every 4-6 hours minimum:  Change oxygen saturation probe site  4.  Every 4-6 hours:  If on nasal continuous positive airway pressure, respiratory therapy assess nares and determine need for appliance change or resting period.  3/21/2024 2218 by Brittney Chu RN  Outcome: Progressing     Problem: Safety - Adult  Goal: Free from fall injury  3/21/2024 2218 by Brittney Chu RN  Outcome: Progressing     Problem: ABCDS Injury Assessment  Goal: Absence of physical injury  3/21/2024 2218 by Brittney Chu RN  Outcome: Progressing  Flowsheets (Taken 3/21/2024 2218)  Absence of Physical Injury: Implement safety measures based on patient assessment

## 2024-03-25 ENCOUNTER — CARE COORDINATION (OUTPATIENT)
Dept: CASE MANAGEMENT | Age: 79
End: 2024-03-25

## 2024-03-25 DIAGNOSIS — N17.9 AKI (ACUTE KIDNEY INJURY) (HCC): Primary | ICD-10-CM

## 2024-03-25 PROCEDURE — 1111F DSCHRG MED/CURRENT MED MERGE: CPT | Performed by: NURSE PRACTITIONER

## 2024-03-25 NOTE — CARE COORDINATION
Care Transitions Initial Follow Up Call    Call within 2 business days of discharge: Yes    Patient Current Location:  Home: [address] [place] [id number]    Care Transition Nurse contacted the patient by telephone to perform post hospital discharge assessment. Verified name and  with patient as identifiers. Provided introduction to self, and explanation of the Care Transition Nurse role.     Patient: Jareth Paulino Patient : 1945   MRN: [id number]  Reason for Admission: TIERNEY  Discharge Date: 3/22/24 RARS: Readmission Risk Score: 18.9      Last Discharge Facility       Date Complaint Diagnosis Description Type Department Provider    3/19/24 Fatigue TIERNEY (acute kidney injury) (HCC) ... ED to Hosp-Admission (Discharged) (ADMITTED) [facility] Rashel Mahmood MD; Dirk Jackson,...            Was this an external facility discharge? No Discharge Facility: [facility]    Challenges to be reviewed by the provider   Additional needs identified to be addressed with provider: Yes  7 day HFU appointment               Method of communication with provider: chart routing.    1st attempt to reach patient for Care Transitions. LMOM requesting return call. Contact information provided.  [phone]    Andrew returned call.  He stated that he was doing all right.  He continues with the weakness, but is able to navigate his new home which was built for handicap.  He stated that he had all his medications and his daughter will be making his hospital follow up appointments.  [facility] was there during this call.  He had no further questions or concerns.    Care Transition Nurse reviewed discharge instructions with patient who verbalized understanding. The patient was given an opportunity to ask questions and does not have any further questions or concerns at this time. Were discharge instructions available to patient? Yes. Reviewed appropriate site of care based on symptoms and resources available to patient including:

## 2024-03-26 ENCOUNTER — CARE COORDINATION (OUTPATIENT)
Dept: CARE COORDINATION | Age: 79
End: 2024-03-26

## 2024-03-26 NOTE — CARE COORDINATION
Vegar called over to [facility] to do a follow up Social service call with them in regards to Patient.    Vegar was asked by Patient's PCP to see if AIDES were available for his continued care; They reported that Patient had declined that service as they were Paying Privately for someone to help care for Patient.    Patient was just recently released from the Hospital and  was following up on that Discharge.

## 2024-03-29 ENCOUNTER — CARE COORDINATION (OUTPATIENT)
Dept: CASE MANAGEMENT | Age: 79
End: 2024-03-29

## 2024-03-29 NOTE — CARE COORDINATION
Care Transitions Follow Up Call    Patient: Jareth Paulino  Patient : 1945   MRN: [id number]  Reason for Admission: TIERNEY   Discharge Date: 3/22/24 RARS: Readmission Risk Score: 18.9      Needs to be reviewed by the provider   Additional needs identified to be addressed with provider: No  none             Method of communication with provider: none.    1st attempt to reach patient for Care Transitions. LMOM requesting return call. Contact information provided.  [phone]    Follow Up  Future Appointments   Date Time Provider Department Center   2024  3:30 PM Law Rosario MD [facility] None   2024  2:45 PM Janette Guerrier MD [facility]        Care Transitions Subsequent and Final Call    Subsequent and Final Calls  Care Transitions Interventions  Other Interventions:             ARLEN MONACO RN

## 2024-04-01 ENCOUNTER — CARE COORDINATION (OUTPATIENT)
Dept: CASE MANAGEMENT | Age: 79
End: 2024-04-01

## 2024-04-01 NOTE — CARE COORDINATION
Care Transitions Follow Up Call      Patient: Jareth Paulino  Patient : 1945   MRN: [id number]  Reason for Admission: TIERNEY   Discharge Date: 3/22/24 RARS: Readmission Risk Score: 18.9      Needs to be reviewed by the provider   Additional needs identified to be addressed with provider: No  none             Method of communication with provider: none.    Final attempt to reach patient for care transitions. No answer and unable to leave voice mail.  Program ended.       Follow Up  Future Appointments   Date Time Provider Department Center   2024  3:30 PM Law Rosario MD [facility] None   2024  2:45 PM Janette Guerrier MD [facility]          Care Transitions Subsequent and Final Call    Subsequent and Final Calls  Care Transitions Interventions  Other Interventions:               ARLEN MONACO RN

## 2024-04-03 ENCOUNTER — CARE COORDINATION (OUTPATIENT)
Dept: CASE MANAGEMENT | Age: 79
End: 2024-04-03

## 2024-04-03 ENCOUNTER — TELEPHONE (OUTPATIENT)
Dept: PRIMARY CARE CLINIC | Age: 79
End: 2024-04-03

## 2024-04-03 NOTE — CARE COORDINATION
Received call from Andrew after program ended.  He stated that he was still having issues.  He denied any increased swelling or shortness of breath.  He said that home care is visiting and he is having therapy.  He stated that he was still weak and is unable to get into the car.  He stated that he had an appointment today with the nephrologist but did no have a ride.  Explained that no transportation could be arranged at this short of notice.  Spoke with Smitha BROWNING and she stated that Irwin ESPOSITO Who has been working with Jareth can sign him up with [facility] for Senior transportation.  It does offer para transport.   Will message Irwin about signing Jareth up with [facility].  Will refer to [facility] for further outreaches.

## 2024-04-03 NOTE — TELEPHONE ENCOUNTER
[facility] called stating they had faxed request for face to face on 3/15 and has not received.  Called will fax request again due to writer does not see that office has received request.

## 2024-04-04 ENCOUNTER — CARE COORDINATION (OUTPATIENT)
Dept: CARE COORDINATION | Age: 79
End: 2024-04-04

## 2024-04-04 NOTE — CARE COORDINATION
received a message from The RN Case Manager that is on the case from yesterday afternoon stating that she had received a call Patient who reported that \"He had missed an appointment because he did not have Transportation to it\".     called [facility] OFFICE ON AGING to get Patient situated with Transportation  for his appointment's.     in turn called Patient and his voicemail box was full, so  called his Daughter who is his Caregiver/POA.    She reported that she thought that it was converted into a TELEHEALTH appointment instead and she would find out:  gave her the Number to [facility] OFFICE ON AGING  so they can arrange Transportation for future appointment's and  did an application as well on yesterday for [facility].

## 2024-04-10 ENCOUNTER — CARE COORDINATION (OUTPATIENT)
Dept: CARE COORDINATION | Age: 79
End: 2024-04-10

## 2024-04-10 NOTE — CARE COORDINATION
Outreach for care management. No answer, LVMM with contact information and requested call back      Future Appointments   Date Time Provider Department Center   4/17/2024  2:45 PM Janette Guerrier MD [facility]   5/1/2024 10:10 AM Vivian Ivy, APRN - CNP [facility] None

## 2024-04-10 NOTE — CARE COORDINATION
Ambulatory Care Coordination Note  4/10/2024    Patient Current Location:  Home: [address] [address]     ACM contacted the patient by telephone. Verified name and  with patient as identifiers. Provided introduction to self, and explanation of the ACM role.     Challenges to be reviewed by the provider   Additional needs identified to be addressed with provider: Yes   Medication; taking Wellbutrin 150mg daily. Patient asks to increasing it               Method of communication with provider: none.    ACM: Julieth Rico RN    talked to patient, he has questions about taking Bumex. Reports, he is urinating a lot, and worries he is dehydrated and about the risks of taking it. Advised to call Nephrology, they voiced understanding.   He has a fear of falling and anxious when getting out of his chair, walking, and working with PT. Taking Wellbutrin 150 mg daily, but is not working as well, he asks about having it increased  Has shortness of breath with activity, no chest pain or swelling. He's not steady on his feet and cannot weigh himself  Talked to his daughter/ Tushar, patient's nephrologist ordered labs and if [facility]  nurse will draw the labs. Reports, patient cannot get into a car or out of the house.      Offered patient enrollment in the Remote Patient Monitoring (RPM) program for in-home monitoring: Patient declined.    Lab Results       None            Care Coordination Interventions    Referral from Primary Care Provider: No  Suggested Interventions and Community Resources          Goals Addressed    None         Future Appointments   Date Time Provider Department Center   2024  2:45 PM [facility]   2024 10:10 AM Vivian Ivy, ANDERSON - CNP [facility] None   ,   Congestive Heart Failure Assessment           Symptoms:          , and   General Assessment

## 2024-04-10 NOTE — CARE COORDINATION
Writer called and talked to [facility], they don't draw labs. Also reports, patient has several wounds on his toes and nurse will re-eval at her next visit.     Called and talked to Nephrology, they will fax lab orders to [facility].       Called and updated daughter, patient developed sores on his toes, after wearing new tennis shoes without socks. Discussed scheduling an appointment, he's not able to come into the office.

## 2024-04-11 NOTE — TELEPHONE ENCOUNTER
He needs to see podiatry. I am afraid I am unsure how long he can remain in the home. It seems like he is declining. He was just started on wellbutrin. I would like to give it more time

## 2024-04-12 ENCOUNTER — CARE COORDINATION (OUTPATIENT)
Dept: CARE COORDINATION | Age: 79
End: 2024-04-12

## 2024-04-12 ENCOUNTER — TELEPHONE (OUTPATIENT)
Dept: PRIMARY CARE CLINIC | Age: 79
End: 2024-04-12

## 2024-04-12 NOTE — TELEPHONE ENCOUNTER
Called Kymberly, advised patient needs appt per PCP but waiting to find out where to put patient in since scheduled booked.  Kymberly states she also advised daughter that patient is not safe at home and daughter is possibly looking into palliative care thru Hospice but Kymberly states would still be in the home.  Writer will send message to Irwin BROWNING to assist with also.    Please advise on appointment date/time.

## 2024-04-12 NOTE — CARE COORDINATION
Writer received the Messages through [employer] in regards to Patient and what the concerns were about him.    Writer in turn called his Daughter who is his CAREGIVER/POA and she reported that they just signed the Paperwork for HOSPICE on today.    She was at work and writer told her she can call me whenever she has an opportunity to do so.

## 2024-04-12 NOTE — TELEPHONE ENCOUNTER
Kymberly from [employer] called to report multiple issues but writer could only take down so many so requested she fax all information to office, she states she will have her boss fax her notes and a BP log was supposed to have been faxed to office already this morning.  States patient was on Metoprolol and HR was 58-85, then patient went in hospital and Metoprolol was discontinued.  Now HR in 90's.    States patient is dizzy, increased urination, increased HR, SOB, low BP, dehydrated-patient states he is not drinking as much as he should.  Has some abrasions on 3rd and 4th toes on left foot due to patient usually does not wear socks with shoes, normally does not wear either and scoots himself around on a computer chair and digs toes into carpet or wooden floor to get around.  Patient was placed on Bupropion 0 mg but seems to have increased anxiety with this medication.  Kymberly states when PT is there, \"she has to talk him off the edge\" when writer asked what that means, she states he has panic attacks.  Interrupted Kymberly a couple times due to so much information and she finally agreed again to have info/notes faxed to PCP.

## 2024-04-12 NOTE — TELEPHONE ENCOUNTER
Pt NEEDS an appointment. He needs to be assessed. I also do not feel he is safe to be at home alone. Can we have our social workers look into this as well. Thanks

## 2024-04-13 ENCOUNTER — HOSPITAL ENCOUNTER (OUTPATIENT)
Age: 79
Setting detail: SPECIMEN
Discharge: HOME | End: 2024-04-13

## 2024-04-13 LAB
25(OH)D3 SERPL-MCNC: 47.3 NG/ML (ref 30–100)
ANION GAP SERPL CALCULATED.3IONS-SCNC: 6 MMOL/L (ref 9–16)
BACTERIA URNS QL MICRO: ABNORMAL
BASOPHILS # BLD: 0.04 K/UL (ref 0–0.2)
BASOPHILS NFR BLD: 0 % (ref 0–2)
BILIRUB UR QL STRIP: NEGATIVE
BNP SERPL-MCNC: ABNORMAL PG/ML (ref 0–300)
BUN SERPL-MCNC: 27 MG/DL (ref 8–23)
CALCIUM SERPL-MCNC: 9.1 MG/DL (ref 8.6–10.4)
CASTS #/AREA URNS LPF: ABNORMAL /LPF (ref 0–8)
CHLORIDE SERPL-SCNC: 104 MMOL/L (ref 98–107)
CLARITY UR: ABNORMAL
CO2 SERPL-SCNC: 26 MMOL/L (ref 20–31)
COLOR UR: YELLOW
CREAT SERPL-MCNC: 1 MG/DL (ref 0.7–1.2)
CREAT UR-MCNC: 42 MG/DL (ref 39–259)
EOSINOPHIL # BLD: 0.07 K/UL (ref 0–0.44)
EOSINOPHILS RELATIVE PERCENT: 1 % (ref 1–4)
EPI CELLS #/AREA URNS HPF: ABNORMAL /HPF (ref 0–5)
ERYTHROCYTE [DISTWIDTH] IN BLOOD BY AUTOMATED COUNT: 17.8 % (ref 11.8–14.4)
GFR SERPL CREATININE-BSD FRML MDRD: 74 ML/MIN/1.73M2
GLUCOSE SERPL-MCNC: 87 MG/DL (ref 74–99)
GLUCOSE UR STRIP-MCNC: NEGATIVE MG/DL
HCT VFR BLD AUTO: 28 % (ref 40.7–50.3)
HGB BLD-MCNC: 9 G/DL (ref 13–17)
HGB UR QL STRIP.AUTO: NEGATIVE
IMM GRANULOCYTES # BLD AUTO: 0.05 K/UL (ref 0–0.3)
IMM GRANULOCYTES NFR BLD: 1 %
KETONES UR STRIP-MCNC: NEGATIVE MG/DL
LEUKOCYTE ESTERASE UR QL STRIP: ABNORMAL
LYMPHOCYTES NFR BLD: 1.79 K/UL (ref 1.1–3.7)
LYMPHOCYTES RELATIVE PERCENT: 17 % (ref 24–43)
MAGNESIUM SERPL-MCNC: 1.8 MG/DL (ref 1.6–2.4)
MCH RBC QN AUTO: 33.7 PG (ref 25.2–33.5)
MCHC RBC AUTO-ENTMCNC: 32.1 G/DL (ref 28.4–34.8)
MCV RBC AUTO: 104.9 FL (ref 82.6–102.9)
MONOCYTES NFR BLD: 1.12 K/UL (ref 0.1–1.2)
MONOCYTES NFR BLD: 11 % (ref 3–12)
NEUTROPHILS NFR BLD: 70 % (ref 36–65)
NEUTS SEG NFR BLD: 7.35 K/UL (ref 1.5–8.1)
NITRITE UR QL STRIP: POSITIVE
NRBC BLD-RTO: 0 PER 100 WBC
PH UR STRIP: 6 [PH] (ref 5–8)
PHOSPHATE SERPL-MCNC: 3.4 MG/DL (ref 2.5–4.5)
PLATELET # BLD AUTO: 307 K/UL (ref 138–453)
PMV BLD AUTO: 10.4 FL (ref 8.1–13.5)
POTASSIUM SERPL-SCNC: 4.5 MMOL/L (ref 3.7–5.3)
PROT UR STRIP-MCNC: NEGATIVE MG/DL
PTH-INTACT SERPL-MCNC: 28 PG/ML (ref 15–65)
RBC # BLD AUTO: 2.67 M/UL (ref 4.21–5.77)
RBC # BLD: ABNORMAL 10*6/UL
RBC # BLD: ABNORMAL 10*6/UL
RBC #/AREA URNS HPF: ABNORMAL /HPF (ref 0–4)
SODIUM SERPL-SCNC: 136 MMOL/L (ref 136–145)
SP GR UR STRIP: 1.01 (ref 1–1.03)
TOTAL PROTEIN, URINE: 16 MG/DL
URINE TOTAL PROTEIN CREATININE RATIO: 0.39
UROBILINOGEN UR STRIP-ACNC: NORMAL EU/DL (ref 0–1)
WBC #/AREA URNS HPF: ABNORMAL /HPF (ref 0–5)
WBC OTHER # BLD: 10.4 K/UL (ref 3.5–11.3)

## 2024-04-13 NOTE — TELEPHONE ENCOUNTER
Ok. He needs at least a 30 min appointment due to all his needs. I am unsure where we can fit him in. He has a lot going on.

## 2024-04-15 ENCOUNTER — TELEPHONE (OUTPATIENT)
Dept: PRIMARY CARE CLINIC | Age: 79
End: 2024-04-15

## 2024-04-15 NOTE — TELEPHONE ENCOUNTER
Received a call from [facility], caller states she is going to fax over a request for information. Writer informed caller of office fax number.

## 2024-04-17 NOTE — TELEPHONE ENCOUNTER
LVM for patient to call the office back    Last Visit Date: 2/28/2024   Next Visit Date: 4/15/2024

## 2024-04-19 NOTE — TELEPHONE ENCOUNTER
Pt called stating he is unable to come to the office but can do a VV to discuss ongoing health issues with PCP. Writer scheduled him with PCP for next week Thursday.

## 2024-04-22 ENCOUNTER — CARE COORDINATION (OUTPATIENT)
Dept: CARE COORDINATION | Age: 79
End: 2024-04-22

## 2024-04-25 ENCOUNTER — PATIENT MESSAGE (OUTPATIENT)
Dept: PRIMARY CARE CLINIC | Age: 79
End: 2024-04-25

## 2024-04-25 ENCOUNTER — TELEMEDICINE (OUTPATIENT)
Dept: PRIMARY CARE CLINIC | Age: 79
End: 2024-04-25
Payer: MEDICARE

## 2024-04-25 DIAGNOSIS — R53.1 WEAKNESS: ICD-10-CM

## 2024-04-25 DIAGNOSIS — I10 ESSENTIAL HYPERTENSION: ICD-10-CM

## 2024-04-25 DIAGNOSIS — F32.0 MILD MAJOR DEPRESSION (HCC): Primary | ICD-10-CM

## 2024-04-25 DIAGNOSIS — Z99.81 SUPPLEMENTAL OXYGEN DEPENDENT: ICD-10-CM

## 2024-04-25 DIAGNOSIS — I50.33 ACUTE ON CHRONIC HEART FAILURE WITH PRESERVED EJECTION FRACTION (HCC): ICD-10-CM

## 2024-04-25 DIAGNOSIS — I48.0 PAF (PAROXYSMAL ATRIAL FIBRILLATION) (HCC): ICD-10-CM

## 2024-04-25 DIAGNOSIS — N18.30 STAGE 3 CHRONIC KIDNEY DISEASE, UNSPECIFIED WHETHER STAGE 3A OR 3B CKD (HCC): ICD-10-CM

## 2024-04-25 PROBLEM — F32.A DEPRESSION: Status: RESOLVED | Noted: 2018-01-15 | Resolved: 2024-04-25

## 2024-04-25 PROBLEM — I50.32 CHRONIC DIASTOLIC CONGESTIVE HEART FAILURE (HCC): Status: RESOLVED | Noted: 2023-11-06 | Resolved: 2024-04-25

## 2024-04-25 PROCEDURE — 1036F TOBACCO NON-USER: CPT | Performed by: NURSE PRACTITIONER

## 2024-04-25 PROCEDURE — 1123F ACP DISCUSS/DSCN MKR DOCD: CPT | Performed by: NURSE PRACTITIONER

## 2024-04-25 PROCEDURE — G8427 DOCREV CUR MEDS BY ELIG CLIN: HCPCS | Performed by: NURSE PRACTITIONER

## 2024-04-25 PROCEDURE — 99215 OFFICE O/P EST HI 40 MIN: CPT | Performed by: NURSE PRACTITIONER

## 2024-04-25 PROCEDURE — G8417 CALC BMI ABV UP PARAM F/U: HCPCS | Performed by: NURSE PRACTITIONER

## 2024-04-25 PROCEDURE — G2211 COMPLEX E/M VISIT ADD ON: HCPCS | Performed by: NURSE PRACTITIONER

## 2024-04-25 ASSESSMENT — ENCOUNTER SYMPTOMS
ABDOMINAL PAIN: 0
SHORTNESS OF BREATH: 1
EYE ITCHING: 0
TROUBLE SWALLOWING: 0
SINUS PRESSURE: 0
EYE REDNESS: 0
NAUSEA: 0
EYE DISCHARGE: 0
VOMITING: 0
SORE THROAT: 0
WHEEZING: 0
CHEST TIGHTNESS: 0
DIARRHEA: 0
SINUS PAIN: 0
COUGH: 0

## 2024-04-25 NOTE — PROGRESS NOTES
difficulty breathing or respiratory distress        [] Abnormal-      Musculoskeletal:   [] Normal gait with no signs of ataxia         [x] Normal range of motion of neck        [] Abnormal-       Neurological:        [x] No Facial Asymmetry (Cranial nerve 7 motor function) (limited exam to video visit)          [] No gaze palsy        [] Abnormal-         Skin:        [x] No significant exanthematous lesions or discoloration noted on facial skin         [] Abnormal-            Psychiatric:       [x] Normal Affect [] No Hallucinations        [] Abnormal-       ASSESSMENT/PLAN:  1. Mild major depression ([facility])  -Patient seems to be doing okay on the Lexapro 10 mg daily.  Will keep him at this dose.    2. Essential hypertension  Unable to assess blood pressure today due to virtual visit.  He is currently not on any medication for his blood pressure.  He is actually on midodrine to help keep his blood pressure up    3. Acute on chronic heart failure with preserved ejection fraction ([facility])  -Recent echocardiogram in March shows a normal ejection fraction.  I did discuss with the patient that it would be necessary for him to follow-up with cardiology in the next 3 to 6 months for evaluation.  In the meantime we will hold off.    4. PAF (paroxysmal atrial fibrillation) ([facility])  Patient is on Eliquis 5 mg twice a day.  He is not currently on any rate controlling medication    5. Stage 3 chronic kidney disease, unspecified whether stage 3a or 3b CKD ([facility])  Patient was recently admitted for acute kidney injury and dehydration.  His most recent chemistries show an improved normal kidney function.  His Bumex has been changed to half a milligram daily.  He does have an appointment on Monday with nephrology.  He does understand the importance of keeping this appointment.    6. Weakness  Patient has been having weakness since he is left the [facility].  He is working with hospice.  He does state to me that he will work on getting them

## 2024-04-26 ENCOUNTER — CARE COORDINATION (OUTPATIENT)
Dept: CARE COORDINATION | Age: 79
End: 2024-04-26

## 2024-04-26 NOTE — CARE COORDINATION
Writer is signing off on the case at this time as Patient has went in under HOSPICE CARE at this time.

## 2024-04-29 ENCOUNTER — CARE COORDINATION (OUTPATIENT)
Dept: CARE COORDINATION | Age: 79
End: 2024-04-29

## 2024-05-20 ENCOUNTER — CARE COORDINATION (OUTPATIENT)
Dept: CARE COORDINATION | Age: 79
End: 2024-05-20

## 2024-11-20 DIAGNOSIS — Z12.11 SCREENING FOR COLON CANCER: Primary | ICD-10-CM

## 2025-01-29 ENCOUNTER — TELEPHONE (OUTPATIENT)
Dept: PRIMARY CARE CLINIC | Age: 80
End: 2025-01-29

## 2025-01-30 NOTE — TELEPHONE ENCOUNTER
Called edith Lauren  
Ok for [facility]. Can they do a 1015 Monday?  
Patient's daughter, Xin called to let us know that the patient is being transferred out of hospice care into palliative care. Patient's daughter was told by palliative care that patient will need to reestablish care with PCP before they will accept him and they will need a referral from PCP. Xin is asking if this can be done by [facility] or does it need to be seen in person. She stated that he hasn't walked in 9-10 months.    Please advise      Patient will be using [facility] for palliative care      
[phone]

## 2025-02-03 ENCOUNTER — TELEPHONE (OUTPATIENT)
Dept: PRIMARY CARE CLINIC | Age: 80
End: 2025-02-03

## 2025-02-03 ENCOUNTER — TELEMEDICINE (OUTPATIENT)
Dept: PRIMARY CARE CLINIC | Age: 80
End: 2025-02-03

## 2025-02-03 DIAGNOSIS — Z13.1 SCREENING FOR DIABETES MELLITUS: ICD-10-CM

## 2025-02-03 DIAGNOSIS — I50.33 ACUTE ON CHRONIC HEART FAILURE WITH PRESERVED EJECTION FRACTION (HCC): ICD-10-CM

## 2025-02-03 DIAGNOSIS — I10 ESSENTIAL HYPERTENSION: Primary | ICD-10-CM

## 2025-02-03 DIAGNOSIS — D68.69 SECONDARY HYPERCOAGULABLE STATE (HCC): ICD-10-CM

## 2025-02-03 DIAGNOSIS — E78.5 HYPERLIPIDEMIA, UNSPECIFIED HYPERLIPIDEMIA TYPE: ICD-10-CM

## 2025-02-03 DIAGNOSIS — N18.30 STAGE 3 CHRONIC KIDNEY DISEASE, UNSPECIFIED WHETHER STAGE 3A OR 3B CKD (HCC): ICD-10-CM

## 2025-02-03 DIAGNOSIS — I48.0 PAF (PAROXYSMAL ATRIAL FIBRILLATION) (HCC): ICD-10-CM

## 2025-02-03 PROBLEM — Z79.01 LONG TERM (CURRENT) USE OF ANTICOAGULANTS: Status: RESOLVED | Noted: 2020-06-19 | Resolved: 2025-02-03

## 2025-02-03 PROBLEM — D53.9 MACROCYTIC ANEMIA: Status: RESOLVED | Noted: 2020-06-23 | Resolved: 2025-02-03

## 2025-02-03 PROBLEM — D72.829 LEUKOCYTOSIS: Status: RESOLVED | Noted: 2023-11-06 | Resolved: 2025-02-03

## 2025-02-03 ASSESSMENT — ENCOUNTER SYMPTOMS
DIARRHEA: 0
ABDOMINAL PAIN: 0
NAUSEA: 0
EYE ITCHING: 0
WHEEZING: 0
SINUS PAIN: 0
SINUS PRESSURE: 0
EYE DISCHARGE: 0
VOMITING: 0
COUGH: 0
SHORTNESS OF BREATH: 0
EYE REDNESS: 0
TROUBLE SWALLOWING: 0
SORE THROAT: 0
CHEST TIGHTNESS: 0

## 2025-02-03 NOTE — TELEPHONE ENCOUNTER
Call made to the patient's daughter to inform them of their PCP's instructions, writer left a voice message for the patient's daughter to contact the office back to further discuss the PCP's instructions.

## 2025-02-03 NOTE — TELEPHONE ENCOUNTER
Writer spoke with pt daughter and she stated she does not feel he needs to see cardiology or nephrology.    Pt daughter would like to speak with PCP on why she  feels pt needs to see them.

## 2025-02-03 NOTE — PROGRESS NOTES
Neurological:  Positive for weakness. Negative for dizziness, light-headedness and headaches.   All other systems reviewed and are negative.          Prior to Visit Medications    Medication Sig Taking? Authorizing Provider   omeprazole (PRILOSEC) 20 MG delayed release capsule Take 1 capsule by mouth Daily Yes Javier Garzon MD   bumetanide (BUMEX) 0.5 MG tablet Take 1 tablet by mouth daily  Rashel Ha MD   escitalopram (LEXAPRO) 10 MG tablet Take 1 tablet by mouth daily  Zuleika Arreola APRN - CNP   ipratropium 0.5 mg-albuterol 2.5 mg (DUONEB) 0.5-2.5 (3) MG/3ML SOLN nebulizer solution Inhale 3 mLs into the lungs every 4 hours as needed for Shortness of Breath or Wheezing  Javier Garzon MD   acetaminophen (TYLENOL) 500 MG tablet Take 1 tablet by mouth every 6 hours as needed for Pain or Fever  Javier Garzon MD   ferrous sulfate (IRON 325) 325 (65 Fe) MG tablet Take 1 tablet by mouth daily (with breakfast)  Javier Garzon MD   tamsulosin (FLOMAX) 0.4 MG capsule Take 1 capsule by mouth daily  Javier Garzon MD   pantoprazole (PROTONIX) 40 MG tablet Take 1 tablet by mouth daily  Javier Garzon MD   midodrine (PROAMATINE) 5 MG tablet Take 1 tablet by mouth 3 times daily (with meals)  Patient taking differently: Take 1 tablet by mouth 3 times daily (with meals) Hold for SBP more than 140  Stewart Harley MD   melatonin 3 MG TABS tablet Take 1 tablet by mouth nightly as needed (sleep)  Stewart Harley MD   vitamin D (CHOLECALCIFEROL) 125 MCG (5000 UT) CAPS capsule Take 1 capsule by mouth daily  Javier Garzon MD   folic acid (FOLVITE) 1 MG tablet Take 1 tablet by mouth daily  Angela Ceballos MD   fenofibrate (TRIGLIDE) 160 MG tablet Take 1 tablet by mouth daily  Angela Ceballos MD   rosuvastatin (CRESTOR) 20 MG tablet Take 1 tablet by mouth daily  Patient taking differently: Take 5 mg by mouth daily  Angela Ceballos MD

## 2025-02-03 NOTE — TELEPHONE ENCOUNTER
Kaylie Williamson  from [facility] called, patient was discharged from hospice yesterday and [facility] called Kaylie and was told they were going to  his wheel chair and walker.  Kaylie called this office to get name of DME our office normally uses so she can contact them and send orders for walker and wheel chair.  Any questions for her, number noted and her extension is [phone].

## 2025-02-03 NOTE — TELEPHONE ENCOUNTER
He does not have to see them. He was previously following them before for his medical conditions. He does not have to. I just wanted to make sure we were covering all areas that is all.

## 2025-02-05 ENCOUNTER — PATIENT MESSAGE (OUTPATIENT)
Dept: PRIMARY CARE CLINIC | Age: 80
End: 2025-02-05

## 2025-02-05 RX ORDER — SENNOSIDES A AND B 8.6 MG/1
1 TABLET, FILM COATED ORAL DAILY
COMMUNITY

## 2025-02-05 RX ORDER — LORAZEPAM 0.5 MG/1
0.5 TABLET ORAL EVERY 6 HOURS PRN
COMMUNITY

## 2025-02-05 RX ORDER — METOLAZONE 2.5 MG/1
2.5 TABLET ORAL DAILY
COMMUNITY

## 2025-02-05 RX ORDER — HYDROXYZINE PAMOATE 25 MG/1
25 CAPSULE ORAL 3 TIMES DAILY PRN
COMMUNITY

## 2025-02-05 RX ORDER — BUMETANIDE 1 MG/1
1 TABLET ORAL DAILY
COMMUNITY

## 2025-02-05 RX ORDER — HYDROMORPHONE HYDROCHLORIDE 2 MG/1
0.5 TABLET ORAL
COMMUNITY

## 2025-02-21 ENCOUNTER — PATIENT MESSAGE (OUTPATIENT)
Dept: PRIMARY CARE CLINIC | Age: 80
End: 2025-02-21

## 2025-02-21 DIAGNOSIS — I10 ESSENTIAL HYPERTENSION: Primary | ICD-10-CM

## 2025-02-21 DIAGNOSIS — E55.9 VITAMIN D DEFICIENCY: ICD-10-CM

## 2025-02-21 DIAGNOSIS — Z12.5 PROSTATE CANCER SCREENING: ICD-10-CM

## 2025-02-21 DIAGNOSIS — R73.01 IMPAIRED FASTING GLUCOSE: ICD-10-CM

## 2025-02-21 DIAGNOSIS — E78.5 HYPERLIPIDEMIA, UNSPECIFIED HYPERLIPIDEMIA TYPE: ICD-10-CM

## 2025-02-21 DIAGNOSIS — N18.30 STAGE 3 CHRONIC KIDNEY DISEASE, UNSPECIFIED WHETHER STAGE 3A OR 3B CKD (HCC): ICD-10-CM

## 2025-02-21 DIAGNOSIS — E53.8 VITAMIN B 12 DEFICIENCY: ICD-10-CM

## 2025-02-28 ENCOUNTER — HOSPITAL ENCOUNTER (OUTPATIENT)
Age: 80
Setting detail: SPECIMEN
Discharge: HOME | End: 2025-02-28

## 2025-02-28 LAB
25(OH)D3 SERPL-MCNC: 20.9 NG/ML (ref 30–100)
ALBUMIN SERPL-MCNC: 3.3 G/DL (ref 3.5–5.2)
ALBUMIN/GLOB SERPL: 1.2 {RATIO} (ref 1–2.5)
ALP SERPL-CCNC: 72 U/L (ref 40–129)
ALT SERPL-CCNC: 7 U/L (ref 10–50)
ANION GAP SERPL CALCULATED.3IONS-SCNC: 12 MMOL/L (ref 9–16)
AST SERPL-CCNC: 25 U/L (ref 10–50)
BILIRUB SERPL-MCNC: 0.4 MG/DL (ref 0–1.2)
BUN SERPL-MCNC: 32 MG/DL (ref 8–23)
CALCIUM SERPL-MCNC: 9.2 MG/DL (ref 8.6–10.4)
CHLORIDE SERPL-SCNC: 96 MMOL/L (ref 98–107)
CHOLEST SERPL-MCNC: 138 MG/DL (ref 0–199)
CHOLESTEROL/HDL RATIO: 4.6
CO2 SERPL-SCNC: 27 MMOL/L (ref 20–31)
CREAT SERPL-MCNC: 1.1 MG/DL (ref 0.7–1.2)
ERYTHROCYTE [DISTWIDTH] IN BLOOD BY AUTOMATED COUNT: 13.7 % (ref 11.8–14.4)
EST. AVERAGE GLUCOSE BLD GHB EST-MCNC: 103 MG/DL
FOLATE SERPL-MCNC: 8.8 NG/ML (ref 4.8–24.2)
GFR, ESTIMATED: 68 ML/MIN/1.73M2
GLUCOSE SERPL-MCNC: 74 MG/DL (ref 74–99)
HBA1C MFR BLD: 5.2 % (ref 4–6)
HCT VFR BLD AUTO: 38.2 % (ref 40.7–50.3)
HDLC SERPL-MCNC: 30 MG/DL
HGB BLD-MCNC: 12.4 G/DL (ref 13–17)
LDLC SERPL CALC-MCNC: 88 MG/DL (ref 0–100)
MCH RBC QN AUTO: 33 PG (ref 25.2–33.5)
MCHC RBC AUTO-ENTMCNC: 32.5 G/DL (ref 28.4–34.8)
MCV RBC AUTO: 101.6 FL (ref 82.6–102.9)
NRBC BLD-RTO: 0 PER 100 WBC
PLATELET # BLD AUTO: 310 K/UL (ref 138–453)
PMV BLD AUTO: 11.3 FL (ref 8.1–13.5)
POTASSIUM SERPL-SCNC: 3.6 MMOL/L (ref 3.7–5.3)
PROT SERPL-MCNC: 6.1 G/DL (ref 6.6–8.7)
PSA SERPL-MCNC: 2.34 NG/ML (ref 0–4)
RBC # BLD AUTO: 3.76 M/UL (ref 4.21–5.77)
SODIUM SERPL-SCNC: 135 MMOL/L (ref 136–145)
T4 FREE SERPL-MCNC: 1.7 NG/DL (ref 0.9–1.7)
TRIGL SERPL-MCNC: 99 MG/DL (ref 0–149)
TSH SERPL DL<=0.05 MIU/L-ACNC: 1.9 UIU/ML (ref 0.27–4.2)
VIT B12 SERPL-MCNC: 415 PG/ML (ref 232–1245)
VLDLC SERPL CALC-MCNC: 20 MG/DL (ref 1–30)
WBC OTHER # BLD: 9 K/UL (ref 3.5–11.3)

## 2025-02-28 RX ORDER — BUMETANIDE 1 MG/1
1 TABLET ORAL DAILY
Qty: 90 TABLET | Refills: 1 | Status: SHIPPED | OUTPATIENT
Start: 2025-02-28

## 2025-03-04 RX ORDER — CHOLECALCIFEROL (VITAMIN D3) 25 MCG
1000 TABLET ORAL DAILY
Qty: 30 TABLET | Refills: 0 | Status: SHIPPED | OUTPATIENT
Start: 2025-03-04

## 2025-03-04 RX ORDER — POTASSIUM CHLORIDE 1500 MG/1
20 TABLET, EXTENDED RELEASE ORAL DAILY
Qty: 30 TABLET | Refills: 5 | Status: SHIPPED | OUTPATIENT
Start: 2025-03-04

## 2025-03-06 ENCOUNTER — TELEMEDICINE (OUTPATIENT)
Dept: PRIMARY CARE CLINIC | Age: 80
End: 2025-03-06

## 2025-03-06 DIAGNOSIS — I50.22 CHRONIC SYSTOLIC CONGESTIVE HEART FAILURE, NYHA CLASS 3 (HCC): ICD-10-CM

## 2025-03-06 DIAGNOSIS — I10 ESSENTIAL HYPERTENSION: ICD-10-CM

## 2025-03-06 DIAGNOSIS — I50.31: ICD-10-CM

## 2025-03-06 DIAGNOSIS — I42.9 CARDIOMYOPATHY, UNSPECIFIED TYPE (HCC): Primary | ICD-10-CM

## 2025-03-06 PROBLEM — F10.10 ALCOHOL ABUSE: Status: RESOLVED | Noted: 2020-06-16 | Resolved: 2025-03-06

## 2025-03-06 PROBLEM — Z79.899 LONG TERM CURRENT USE OF ANTIARRHYTHMIC DRUG: Status: RESOLVED | Noted: 2020-06-19 | Resolved: 2025-03-06

## 2025-03-06 PROBLEM — N18.30 STAGE 3 CHRONIC KIDNEY DISEASE (HCC): Status: RESOLVED | Noted: 2024-03-01 | Resolved: 2025-03-06

## 2025-03-06 PROBLEM — I50.33 ACUTE ON CHRONIC HEART FAILURE WITH PRESERVED EJECTION FRACTION (HCC): Status: RESOLVED | Noted: 2023-12-24 | Resolved: 2025-03-06

## 2025-03-06 PROBLEM — J98.11 ATELECTASIS, LEFT: Status: RESOLVED | Noted: 2023-11-06 | Resolved: 2025-03-06

## 2025-03-06 PROBLEM — I95.9 ARTERIAL HYPOTENSION: Status: RESOLVED | Noted: 2023-11-07 | Resolved: 2025-03-06

## 2025-03-06 PROBLEM — Z98.890 STATUS POST CATHETER ABLATION OF ATRIAL FIBRILLATION: Status: RESOLVED | Noted: 2020-06-19 | Resolved: 2025-03-06

## 2025-03-06 PROBLEM — D68.69 SECONDARY HYPERCOAGULABLE STATE: Status: RESOLVED | Noted: 2024-03-01 | Resolved: 2025-03-06

## 2025-03-06 PROBLEM — N17.9 AKI (ACUTE KIDNEY INJURY): Status: RESOLVED | Noted: 2023-11-06 | Resolved: 2025-03-06

## 2025-03-06 RX ORDER — DOCUSATE SODIUM 50MG AND SENNOSIDES 8.6MG 8.6; 5 MG/1; MG/1
2 TABLET, FILM COATED ORAL 3 TIMES DAILY
COMMUNITY
Start: 2025-01-30 | End: 2025-03-06

## 2025-03-06 RX ORDER — HYDROXYZINE HYDROCHLORIDE 25 MG/1
25 TABLET, FILM COATED ORAL EVERY 6 HOURS PRN
COMMUNITY
Start: 2025-02-02 | End: 2025-03-06

## 2025-03-06 RX ORDER — METOPROLOL TARTRATE 25 MG/1
25 TABLET, FILM COATED ORAL EVERY MORNING
COMMUNITY
Start: 2025-02-05

## 2025-03-06 SDOH — ECONOMIC STABILITY: FOOD INSECURITY: WITHIN THE PAST 12 MONTHS, THE FOOD YOU BOUGHT JUST DIDN'T LAST AND YOU DIDN'T HAVE MONEY TO GET MORE.: NEVER TRUE

## 2025-03-06 SDOH — ECONOMIC STABILITY: FOOD INSECURITY: WITHIN THE PAST 12 MONTHS, YOU WORRIED THAT YOUR FOOD WOULD RUN OUT BEFORE YOU GOT MONEY TO BUY MORE.: NEVER TRUE

## 2025-03-06 ASSESSMENT — PATIENT HEALTH QUESTIONNAIRE - PHQ9
1. LITTLE INTEREST OR PLEASURE IN DOING THINGS: NOT AT ALL
SUM OF ALL RESPONSES TO PHQ QUESTIONS 1-9: 0
6. FEELING BAD ABOUT YOURSELF - OR THAT YOU ARE A FAILURE OR HAVE LET YOURSELF OR YOUR FAMILY DOWN: NOT AT ALL
3. TROUBLE FALLING OR STAYING ASLEEP: NOT AT ALL
9. THOUGHTS THAT YOU WOULD BE BETTER OFF DEAD, OR OF HURTING YOURSELF: NOT AT ALL
SUM OF ALL RESPONSES TO PHQ QUESTIONS 1-9: 0
10. IF YOU CHECKED OFF ANY PROBLEMS, HOW DIFFICULT HAVE THESE PROBLEMS MADE IT FOR YOU TO DO YOUR WORK, TAKE CARE OF THINGS AT HOME, OR GET ALONG WITH OTHER PEOPLE: NOT DIFFICULT AT ALL
8. MOVING OR SPEAKING SO SLOWLY THAT OTHER PEOPLE COULD HAVE NOTICED. OR THE OPPOSITE, BEING SO FIGETY OR RESTLESS THAT YOU HAVE BEEN MOVING AROUND A LOT MORE THAN USUAL: NOT AT ALL
2. FEELING DOWN, DEPRESSED OR HOPELESS: NOT AT ALL
SUM OF ALL RESPONSES TO PHQ QUESTIONS 1-9: 0
4. FEELING TIRED OR HAVING LITTLE ENERGY: NOT AT ALL
5. POOR APPETITE OR OVEREATING: NOT AT ALL
7. TROUBLE CONCENTRATING ON THINGS, SUCH AS READING THE NEWSPAPER OR WATCHING TELEVISION: NOT AT ALL
SUM OF ALL RESPONSES TO PHQ QUESTIONS 1-9: 0

## 2025-03-06 NOTE — PROGRESS NOTES
12y+), IM, 100 mcg/0.5mL 05/26/2021, 07/01/2021, 01/18/2022    COVID-19, MODERNA, (age 12y+), IM, 50mcg/0.5mL 01/03/2024    Influenza, FLUAD, (age 65 y+), IM, Quadv, 0.5mL 09/21/2023    Pneumococcal, PCV20, PREVNAR 20, (age 6w+), IM, 0.5mL 09/21/2023    Pneumococcal, PPSV23, PNEUMOVAX 23, (age 2y+), SC/IM, 0.5mL 02/23/2022   ,   Health Maintenance   Topic Date Due    DTaP/Tdap/Td vaccine (1 - Tdap) Never done    Shingles vaccine (1 of 2) Never done    Respiratory Syncytial Virus (RSV) Pregnant or age 60 yrs+ (1 - 1-dose 75+ series) Never done    Annual Wellness Visit (Medicare)  11/27/2023    Flu vaccine (1) 08/01/2024    COVID-19 Vaccine (6 - 2024-25 season) 09/01/2024    Depression Monitoring  03/06/2026    Pneumococcal 50+ years Vaccine  Completed    Hepatitis A vaccine  Aged Out    Hepatitis B vaccine  Aged Out    Hib vaccine  Aged Out    Polio vaccine  Aged Out    Meningococcal (ACWY) vaccine  Aged Out    Meningococcal B vaccine  Aged Out    Lipids  Discontinued    Hepatitis C screen  Discontinued       PHYSICAL EXAMINATION:  [ INSTRUCTIONS:  \"[x]\" Indicates a positive item  \"[]\" Indicates a negative item  -- DELETE ALL ITEMS NOT EXAMINED]  Vital Signs: (As obtained by patient/caregiver or practitioner observation)    Constitutional: [x] Appears well-developed and well-nourished [x] No apparent distress      [] Abnormal-   Mental status  [x] Alert and awake  [x] Oriented to person/place/time [x]Able to follow commands      Eyes:  EOM    [x]  Normal  [] Abnormal-  Sclera  []  Normal  [] Abnormal -         Discharge []  None visible  [] Abnormal -    HENT:   [x] Normocephalic, atraumatic.  [] Abnormal   [x] Mouth/Throat: Mucous membranes are moist.     External Ears [x] Normal  [] Abnormal-     Neck: [x] No visualized mass     Pulmonary/Chest: [x] Respiratory effort normal.  [x] No visualized signs of difficulty breathing or respiratory distress        [] Abnormal-      Musculoskeletal:   [] Normal gait with no

## 2025-03-08 ASSESSMENT — ENCOUNTER SYMPTOMS
SINUS PAIN: 0
WHEEZING: 0
SORE THROAT: 0
VOMITING: 0
TROUBLE SWALLOWING: 0
NAUSEA: 0
EYE DISCHARGE: 0
EYE ITCHING: 0
CHEST TIGHTNESS: 0
ABDOMINAL PAIN: 0
SHORTNESS OF BREATH: 0
EYE REDNESS: 0
SINUS PRESSURE: 0
DIARRHEA: 0
COUGH: 0

## 2025-03-10 ENCOUNTER — PATIENT MESSAGE (OUTPATIENT)
Dept: PRIMARY CARE CLINIC | Age: 80
End: 2025-03-10

## 2025-03-11 ENCOUNTER — PATIENT MESSAGE (OUTPATIENT)
Dept: PRIMARY CARE CLINIC | Age: 80
End: 2025-03-11

## 2025-03-11 DIAGNOSIS — E03.2 HYPOTHYROIDISM DUE TO MEDICATION: ICD-10-CM

## 2025-03-12 RX ORDER — CHOLECALCIFEROL (VITAMIN D3) 25 MCG
1000 TABLET ORAL DAILY
Qty: 90 TABLET | Refills: 3 | Status: SHIPPED | OUTPATIENT
Start: 2025-03-12

## 2025-03-12 RX ORDER — BUMETANIDE 1 MG/1
1 TABLET ORAL DAILY
Qty: 90 TABLET | Refills: 1 | Status: SHIPPED | OUTPATIENT
Start: 2025-03-12

## 2025-03-12 RX ORDER — LEVOTHYROXINE SODIUM 150 UG/1
150 TABLET ORAL DAILY
Qty: 90 TABLET | Refills: 3 | Status: SHIPPED | OUTPATIENT
Start: 2025-03-12 | End: 2026-03-12

## 2025-03-12 RX ORDER — METOLAZONE 2.5 MG/1
2.5 TABLET ORAL DAILY
Qty: 90 TABLET | Refills: 3 | Status: SHIPPED | OUTPATIENT
Start: 2025-03-12

## 2025-03-12 RX ORDER — METOPROLOL TARTRATE 25 MG/1
25 TABLET, FILM COATED ORAL EVERY MORNING
Qty: 90 TABLET | Refills: 3 | Status: SHIPPED | OUTPATIENT
Start: 2025-03-12

## 2025-03-12 RX ORDER — POTASSIUM CHLORIDE 1500 MG/1
20 TABLET, EXTENDED RELEASE ORAL DAILY
Qty: 90 TABLET | Refills: 3 | Status: SHIPPED | OUTPATIENT
Start: 2025-03-12

## 2025-03-17 DIAGNOSIS — E03.2 HYPOTHYROIDISM DUE TO MEDICATION: ICD-10-CM

## 2025-03-18 RX ORDER — LEVOTHYROXINE SODIUM 150 UG/1
150 TABLET ORAL DAILY
Qty: 14 TABLET | Refills: 0 | Status: SHIPPED | OUTPATIENT
Start: 2025-03-18

## 2025-03-18 RX ORDER — METOPROLOL TARTRATE 25 MG/1
25 TABLET, FILM COATED ORAL EVERY MORNING
Qty: 90 TABLET | Refills: 3 | Status: SHIPPED | OUTPATIENT
Start: 2025-03-18 | End: 2025-03-18 | Stop reason: SDUPTHER

## 2025-03-18 RX ORDER — LEVOTHYROXINE SODIUM 150 UG/1
150 TABLET ORAL DAILY
Qty: 90 TABLET | Refills: 3 | Status: SHIPPED | OUTPATIENT
Start: 2025-03-18 | End: 2025-03-18 | Stop reason: SDUPTHER

## 2025-03-18 RX ORDER — METOLAZONE 2.5 MG/1
2.5 TABLET ORAL DAILY
Qty: 90 TABLET | Refills: 3 | Status: SHIPPED | OUTPATIENT
Start: 2025-03-18 | End: 2025-03-18 | Stop reason: SDUPTHER

## 2025-03-18 RX ORDER — METOLAZONE 2.5 MG/1
2.5 TABLET ORAL DAILY
Qty: 14 TABLET | Refills: 0 | Status: SHIPPED | OUTPATIENT
Start: 2025-03-18

## 2025-03-18 RX ORDER — METOPROLOL TARTRATE 25 MG/1
25 TABLET, FILM COATED ORAL EVERY MORNING
Qty: 14 TABLET | Refills: 0 | Status: SHIPPED | OUTPATIENT
Start: 2025-03-18

## 2025-03-18 NOTE — TELEPHONE ENCOUNTER
Pt's daughter called requesting a short supply of medication sent to local pharmacy while waiting for mail order as pt is out of medication. Daughter Xin would like to be notified when medication is sent [phone]

## 2025-03-21 ENCOUNTER — PATIENT MESSAGE (OUTPATIENT)
Dept: PRIMARY CARE CLINIC | Age: 80
End: 2025-03-21

## 2025-03-21 DIAGNOSIS — E03.2 HYPOTHYROIDISM DUE TO MEDICATION: ICD-10-CM

## 2025-03-25 RX ORDER — TAMSULOSIN HYDROCHLORIDE 0.4 MG/1
0.4 CAPSULE ORAL DAILY
Qty: 90 CAPSULE | Refills: 1 | Status: SHIPPED | OUTPATIENT
Start: 2025-03-25

## 2025-03-25 RX ORDER — LEVOTHYROXINE SODIUM 150 UG/1
150 TABLET ORAL DAILY
Qty: 90 TABLET | Refills: 1 | Status: SHIPPED | OUTPATIENT
Start: 2025-03-25

## 2025-03-25 RX ORDER — METOPROLOL TARTRATE 25 MG/1
25 TABLET, FILM COATED ORAL EVERY MORNING
Qty: 90 TABLET | Refills: 1 | Status: SHIPPED | OUTPATIENT
Start: 2025-03-25

## 2025-03-25 RX ORDER — METOLAZONE 2.5 MG/1
2.5 TABLET ORAL DAILY
Qty: 90 TABLET | Refills: 1 | Status: SHIPPED | OUTPATIENT
Start: 2025-03-25

## 2025-03-25 RX ORDER — TAMSULOSIN HYDROCHLORIDE 0.4 MG/1
0.4 CAPSULE ORAL DAILY
COMMUNITY
End: 2025-03-25 | Stop reason: SDUPTHER

## 2025-03-27 ENCOUNTER — HOSPITAL ENCOUNTER (OUTPATIENT)
Age: 80
Discharge: HOME | End: 2025-03-29
Payer: MEDICARE

## 2025-03-27 ENCOUNTER — RESULTS FOLLOW-UP (OUTPATIENT)
Dept: PRIMARY CARE CLINIC | Age: 80
End: 2025-03-27

## 2025-03-27 VITALS — WEIGHT: 236 LBS | HEIGHT: 71 IN | BODY MASS INDEX: 33.04 KG/M2

## 2025-03-27 DIAGNOSIS — I42.9 CARDIOMYOPATHY, UNSPECIFIED TYPE (HCC): ICD-10-CM

## 2025-03-27 DIAGNOSIS — I50.31: ICD-10-CM

## 2025-03-27 LAB
ECHO AO ROOT DIAM: 3.6 CM
ECHO AO ROOT INDEX: 1.59 CM/M2
ECHO AV AREA PEAK VELOCITY: 3.4 CM2
ECHO AV AREA VTI: 3.1 CM2
ECHO AV AREA/BSA PEAK VELOCITY: 1.5 CM2/M2
ECHO AV AREA/BSA VTI: 1.4 CM2/M2
ECHO AV MEAN GRADIENT: 1 MMHG
ECHO AV MEAN VELOCITY: 0.5 M/S
ECHO AV PEAK GRADIENT: 1 MMHG
ECHO AV PEAK VELOCITY: 0.6 M/S
ECHO AV VELOCITY RATIO: 0.83
ECHO AV VTI: 13.1 CM
ECHO BSA: 2.32 M2
ECHO EST RA PRESSURE: 3 MMHG
ECHO IVC EXP: 2 CM
ECHO IVC INSP: 0.7 CM
ECHO LA AREA 2C: 29.2 CM2
ECHO LA AREA 4C: 27.2 CM2
ECHO LA DIAMETER INDEX: 2.35 CM/M2
ECHO LA DIAMETER: 5.3 CM
ECHO LA MAJOR AXIS: 7 CM
ECHO LA MINOR AXIS: 6.8 CM
ECHO LA TO AORTIC ROOT RATIO: 1.47
ECHO LA VOL BP: 93 ML (ref 18–58)
ECHO LA VOL MOD A2C: 98 ML (ref 18–58)
ECHO LA VOL MOD A4C: 86 ML (ref 18–58)
ECHO LA VOL/BSA BIPLANE: 41 ML/M2 (ref 16–34)
ECHO LA VOLUME INDEX MOD A2C: 43 ML/M2 (ref 16–34)
ECHO LA VOLUME INDEX MOD A4C: 38 ML/M2 (ref 16–34)
ECHO LV E' LATERAL VELOCITY: 5.33 CM/S
ECHO LV E' SEPTAL VELOCITY: 4.35 CM/S
ECHO LV EF PHYSICIAN: 50 %
ECHO LV FRACTIONAL SHORTENING: 28 % (ref 28–44)
ECHO LV INTERNAL DIMENSION DIASTOLE INDEX: 1.59 CM/M2
ECHO LV INTERNAL DIMENSION DIASTOLIC: 3.6 CM (ref 4.2–5.9)
ECHO LV INTERNAL DIMENSION SYSTOLIC INDEX: 1.15 CM/M2
ECHO LV INTERNAL DIMENSION SYSTOLIC: 2.6 CM
ECHO LV IVSD: 1.2 CM (ref 0.6–1)
ECHO LV MASS 2D: 141.5 G (ref 88–224)
ECHO LV MASS INDEX 2D: 62.6 G/M2 (ref 49–115)
ECHO LV POSTERIOR WALL DIASTOLIC: 1.2 CM (ref 0.6–1)
ECHO LV RELATIVE WALL THICKNESS RATIO: 0.67
ECHO LVOT AREA: 4.2 CM2
ECHO LVOT AV VTI INDEX: 0.74
ECHO LVOT DIAM: 2.3 CM
ECHO LVOT MEAN GRADIENT: 1 MMHG
ECHO LVOT PEAK GRADIENT: 1 MMHG
ECHO LVOT PEAK VELOCITY: 0.5 M/S
ECHO LVOT STROKE VOLUME INDEX: 17.8 ML/M2
ECHO LVOT SV: 40.3 ML
ECHO LVOT VTI: 9.7 CM
ECHO MV E DECELERATION TIME (DT): 176 MS
ECHO MV E VELOCITY: 1.04 M/S
ECHO MV E/E' LATERAL: 19.51
ECHO MV E/E' RATIO (AVERAGED): 21.71
ECHO MV E/E' SEPTAL: 23.91
ECHO RIGHT VENTRICULAR SYSTOLIC PRESSURE (RVSP): 30 MMHG
ECHO RV BASAL DIMENSION: 4.7 CM
ECHO RV FREE WALL PEAK S': 7 CM/S
ECHO TV REGURGITANT MAX VELOCITY: 2.62 M/S
ECHO TV REGURGITANT PEAK GRADIENT: 27 MMHG

## 2025-03-27 PROCEDURE — 93306 TTE W/DOPPLER COMPLETE: CPT

## 2025-03-28 ENCOUNTER — PATIENT MESSAGE (OUTPATIENT)
Dept: PRIMARY CARE CLINIC | Age: 80
End: 2025-03-28

## 2025-03-28 DIAGNOSIS — I50.22 CHRONIC SYSTOLIC CONGESTIVE HEART FAILURE, NYHA CLASS 3 (HCC): ICD-10-CM

## 2025-03-28 DIAGNOSIS — I48.0 PAF (PAROXYSMAL ATRIAL FIBRILLATION) (HCC): ICD-10-CM

## 2025-03-28 DIAGNOSIS — I42.9 CARDIOMYOPATHY, UNSPECIFIED TYPE (HCC): Primary | ICD-10-CM

## 2025-03-28 DIAGNOSIS — E78.5 HYPERLIPIDEMIA, UNSPECIFIED HYPERLIPIDEMIA TYPE: ICD-10-CM

## 2025-03-28 DIAGNOSIS — I10 ESSENTIAL HYPERTENSION: ICD-10-CM

## 2025-04-01 ENCOUNTER — OFFICE VISIT (OUTPATIENT)
Dept: PRIMARY CARE CLINIC | Age: 80
End: 2025-04-01

## 2025-04-01 VITALS
DIASTOLIC BLOOD PRESSURE: 74 MMHG | BODY MASS INDEX: 34.87 KG/M2 | RESPIRATION RATE: 16 BRPM | WEIGHT: 250 LBS | HEART RATE: 89 BPM | SYSTOLIC BLOOD PRESSURE: 128 MMHG | OXYGEN SATURATION: 99 %

## 2025-04-01 DIAGNOSIS — L29.9 ITCHING: ICD-10-CM

## 2025-04-01 DIAGNOSIS — G62.9 NEUROPATHY: ICD-10-CM

## 2025-04-01 DIAGNOSIS — I10 ESSENTIAL HYPERTENSION: ICD-10-CM

## 2025-04-01 DIAGNOSIS — I50.22 CHRONIC SYSTOLIC CONGESTIVE HEART FAILURE, NYHA CLASS 3 (HCC): Primary | ICD-10-CM

## 2025-04-01 RX ORDER — GUAIFENESIN AND DEXTROMETHORPHAN HYDROBROMIDE 10; 100 MG/5ML; MG/5ML
10 SYRUP ORAL EVERY 4 HOURS PRN
COMMUNITY

## 2025-04-01 RX ORDER — FERROUS SULFATE 325(65) MG
325 TABLET ORAL
COMMUNITY

## 2025-04-01 ASSESSMENT — ENCOUNTER SYMPTOMS
TROUBLE SWALLOWING: 0
SORE THROAT: 0
COUGH: 0
NAUSEA: 0
EYE ITCHING: 0
SINUS PRESSURE: 0
SINUS PAIN: 0
ABDOMINAL PAIN: 0
WHEEZING: 0
DIARRHEA: 0
SHORTNESS OF BREATH: 0
EYE DISCHARGE: 0
EYE REDNESS: 0
CHEST TIGHTNESS: 0
VOMITING: 0

## 2025-04-01 NOTE — PROGRESS NOTES
[facility]  [facility]  [address] [place] [address] [address]  [address]  [facility] [address]  Dept: [phone]  Dept Fax: [phone]    Jareth Paulino is a 80 y.o. male who presentstoday for his medical conditions/complaints as noted below.  Jareth Paulino is c/o of  Chief Complaint   Patient presents with    Follow-up     discuss ECHO results           HPI:     History of Present Illness  The patient presents for evaluation of right atrial dilation, pruritus, and neuropathy. He is accompanied by his daughter's , Paul. We did call his daughter Tushar during visit today to discuss results     Significant swelling under the arm is reported, attributed to a recent echocardiogram. The procedure was challenging due to rib structure, causing discomfort. Historically, monitoring on that side has been painful. There is a history of two ablations and five shock treatments performed by [facility] doctors. An appointment with his previous cardiologist is being sought for further evaluation of right atrial dilation. No shortness of breath is reported, and overall health is better than in the past five years. Leg swelling has not occurred for the past 5 to 6 months, a symptom that previously resolved with urination. Improved kidney function is believed based on recent blood tests.    Itching on the exterior of the arms and legs is reported, for which medication is taken. Dryness of lips and mouth at night is attributed to dehydration. Frequent consumption of soda is noted.    A persistent tingling sensation in the feet, akin to numbness, is described, with certain areas near the toes lacking sensation. Fingers are consistently cold and tingly, although they do not feel cold to touch. No back pain is reported. Sleeping on the back in an elevated bed or chair is uncomfortable, as it is not the usual sleeping position. A history of back injuries from basketball is noted, which previously caused pain

## 2025-04-06 ENCOUNTER — PATIENT MESSAGE (OUTPATIENT)
Dept: PRIMARY CARE CLINIC | Age: 80
End: 2025-04-06

## 2025-04-06 DIAGNOSIS — E03.2 HYPOTHYROIDISM DUE TO MEDICATION: ICD-10-CM

## 2025-04-07 DIAGNOSIS — E03.2 HYPOTHYROIDISM DUE TO MEDICATION: ICD-10-CM

## 2025-04-07 RX ORDER — BUMETANIDE 1 MG/1
1 TABLET ORAL DAILY
Qty: 90 TABLET | Refills: 1 | Status: SHIPPED | OUTPATIENT
Start: 2025-04-07 | End: 2025-04-07 | Stop reason: SDUPTHER

## 2025-04-07 RX ORDER — METOLAZONE 2.5 MG/1
2.5 TABLET ORAL DAILY
Qty: 90 TABLET | Refills: 1 | Status: SHIPPED | OUTPATIENT
Start: 2025-04-07 | End: 2025-04-07 | Stop reason: SDUPTHER

## 2025-04-07 RX ORDER — POTASSIUM CHLORIDE 1500 MG/1
20 TABLET, EXTENDED RELEASE ORAL DAILY
Qty: 90 TABLET | Refills: 3 | Status: SHIPPED | OUTPATIENT
Start: 2025-04-07 | End: 2025-04-07 | Stop reason: SDUPTHER

## 2025-04-07 RX ORDER — LEVOTHYROXINE SODIUM 150 UG/1
150 TABLET ORAL DAILY
Qty: 10 TABLET | Refills: 0 | Status: SHIPPED | OUTPATIENT
Start: 2025-04-07 | End: 2025-04-11 | Stop reason: SDUPTHER

## 2025-04-07 RX ORDER — CHOLECALCIFEROL (VITAMIN D3) 25 MCG
1000 TABLET ORAL DAILY
Qty: 10 TABLET | Refills: 0 | Status: SHIPPED | OUTPATIENT
Start: 2025-04-07 | End: 2025-04-11 | Stop reason: SDUPTHER

## 2025-04-07 RX ORDER — BUMETANIDE 1 MG/1
1 TABLET ORAL DAILY
Qty: 10 TABLET | Refills: 0 | Status: SHIPPED | OUTPATIENT
Start: 2025-04-07 | End: 2025-04-11 | Stop reason: SDUPTHER

## 2025-04-07 RX ORDER — METOPROLOL TARTRATE 25 MG/1
25 TABLET, FILM COATED ORAL EVERY MORNING
Qty: 10 TABLET | Refills: 0 | Status: SHIPPED | OUTPATIENT
Start: 2025-04-07 | End: 2025-04-11 | Stop reason: SDUPTHER

## 2025-04-07 RX ORDER — TAMSULOSIN HYDROCHLORIDE 0.4 MG/1
0.4 CAPSULE ORAL DAILY
Qty: 90 CAPSULE | Refills: 1 | Status: SHIPPED | OUTPATIENT
Start: 2025-04-07

## 2025-04-07 RX ORDER — METOLAZONE 2.5 MG/1
2.5 TABLET ORAL DAILY
Qty: 10 TABLET | Refills: 0 | Status: SHIPPED | OUTPATIENT
Start: 2025-04-07 | End: 2025-04-11 | Stop reason: SDUPTHER

## 2025-04-07 RX ORDER — CHOLECALCIFEROL (VITAMIN D3) 25 MCG
1000 TABLET ORAL DAILY
Qty: 90 TABLET | Refills: 3 | Status: SHIPPED | OUTPATIENT
Start: 2025-04-07 | End: 2025-04-07 | Stop reason: SDUPTHER

## 2025-04-07 RX ORDER — POTASSIUM CHLORIDE 1500 MG/1
20 TABLET, EXTENDED RELEASE ORAL DAILY
Qty: 10 TABLET | Refills: 0 | Status: SHIPPED | OUTPATIENT
Start: 2025-04-07 | End: 2025-04-11 | Stop reason: SDUPTHER

## 2025-04-07 RX ORDER — METOPROLOL TARTRATE 25 MG/1
25 TABLET, FILM COATED ORAL EVERY MORNING
Qty: 90 TABLET | Refills: 1 | Status: SHIPPED | OUTPATIENT
Start: 2025-04-07 | End: 2025-04-07 | Stop reason: SDUPTHER

## 2025-04-07 RX ORDER — LEVOTHYROXINE SODIUM 150 UG/1
150 TABLET ORAL DAILY
Qty: 90 TABLET | Refills: 1 | Status: SHIPPED | OUTPATIENT
Start: 2025-04-07 | End: 2025-04-07 | Stop reason: SDUPTHER

## 2025-04-08 RX ORDER — TAMSULOSIN HYDROCHLORIDE 0.4 MG/1
0.4 CAPSULE ORAL DAILY
Qty: 90 CAPSULE | Refills: 1 | Status: SHIPPED | OUTPATIENT
Start: 2025-04-08 | End: 2025-04-11 | Stop reason: SDUPTHER

## 2025-04-08 RX ORDER — SENNOSIDES A AND B 8.6 MG/1
1 TABLET, FILM COATED ORAL 2 TIMES DAILY
Qty: 180 TABLET | Refills: 1 | Status: SHIPPED | OUTPATIENT
Start: 2025-04-08 | End: 2025-04-11 | Stop reason: SDUPTHER

## 2025-04-11 RX ORDER — LEVOTHYROXINE SODIUM 150 UG/1
150 TABLET ORAL DAILY
Qty: 30 TABLET | Refills: 2 | Status: SHIPPED | OUTPATIENT
Start: 2025-04-11

## 2025-04-11 RX ORDER — METOLAZONE 2.5 MG/1
2.5 TABLET ORAL DAILY
Qty: 30 TABLET | Refills: 2 | Status: SHIPPED | OUTPATIENT
Start: 2025-04-11

## 2025-04-11 RX ORDER — TAMSULOSIN HYDROCHLORIDE 0.4 MG/1
0.4 CAPSULE ORAL DAILY
Qty: 30 CAPSULE | Refills: 2 | Status: SHIPPED | OUTPATIENT
Start: 2025-04-11

## 2025-04-11 RX ORDER — BUMETANIDE 1 MG/1
1 TABLET ORAL DAILY
Qty: 30 TABLET | Refills: 2 | Status: SHIPPED | OUTPATIENT
Start: 2025-04-11

## 2025-04-11 RX ORDER — CHOLECALCIFEROL (VITAMIN D3) 25 MCG
1000 TABLET ORAL DAILY
Qty: 30 TABLET | Refills: 2 | Status: SHIPPED | OUTPATIENT
Start: 2025-04-11

## 2025-04-11 RX ORDER — METOPROLOL TARTRATE 25 MG/1
25 TABLET, FILM COATED ORAL EVERY MORNING
Qty: 30 TABLET | Refills: 2 | Status: SHIPPED | OUTPATIENT
Start: 2025-04-11

## 2025-04-11 RX ORDER — SENNOSIDES A AND B 8.6 MG/1
1 TABLET, FILM COATED ORAL 2 TIMES DAILY
Qty: 180 TABLET | Refills: 1 | Status: SHIPPED | OUTPATIENT
Start: 2025-04-11 | End: 2026-04-11

## 2025-04-11 RX ORDER — POTASSIUM CHLORIDE 1500 MG/1
20 TABLET, EXTENDED RELEASE ORAL DAILY
Qty: 30 TABLET | Refills: 2 | Status: SHIPPED | OUTPATIENT
Start: 2025-04-11

## 2025-04-11 NOTE — TELEPHONE ENCOUNTER
Called and spoke with pt because \"[facility] \" was unable to be found in system. Pt reports the mail order [facility] he uses is called [facility] after further investigation [facility] appears [facility]. Pt is aware he will need to make an account with [facility] if he hasn't already in order to obtain scripts.       Medications pended to [facility]

## 2025-04-14 ENCOUNTER — TELEPHONE (OUTPATIENT)
Age: 80
End: 2025-04-14

## 2025-04-14 NOTE — TELEPHONE ENCOUNTER
Pt appt 4/29 was canceled.    Called PT to see if he was available to come in 4/17 to see Dr. Lares.

## 2025-04-22 ENCOUNTER — TELEPHONE (OUTPATIENT)
Dept: PRIMARY CARE CLINIC | Age: 80
End: 2025-04-22

## 2025-04-22 RX ORDER — BUMETANIDE 1 MG/1
1 TABLET ORAL 2 TIMES DAILY
Qty: 180 TABLET | Refills: 2 | Status: SHIPPED | OUTPATIENT
Start: 2025-04-22

## 2025-04-22 NOTE — TELEPHONE ENCOUNTER
Pt's daughter called into office stating the Bumex that was sent in was supposed to be for 60 days to take twice daily.    She is asking PCP to send in 3 months worth of medication so they don't have to call frequently for refill.     Please advise   Thanks

## 2025-04-28 DIAGNOSIS — E03.2 HYPOTHYROIDISM DUE TO MEDICATION: ICD-10-CM

## 2025-04-28 DIAGNOSIS — L29.9 ITCHING: Primary | ICD-10-CM

## 2025-04-28 RX ORDER — METOLAZONE 2.5 MG/1
2.5 TABLET ORAL DAILY
Qty: 90 TABLET | Refills: 3 | Status: SHIPPED | OUTPATIENT
Start: 2025-04-28

## 2025-04-28 RX ORDER — METOPROLOL TARTRATE 25 MG/1
25 TABLET, FILM COATED ORAL EVERY MORNING
Qty: 90 TABLET | Refills: 3 | Status: SHIPPED | OUTPATIENT
Start: 2025-04-28

## 2025-04-28 RX ORDER — LEVOTHYROXINE SODIUM 150 UG/1
150 TABLET ORAL DAILY
Qty: 90 TABLET | Refills: 3 | Status: SHIPPED | OUTPATIENT
Start: 2025-04-28

## 2025-04-28 RX ORDER — TAMSULOSIN HYDROCHLORIDE 0.4 MG/1
0.4 CAPSULE ORAL DAILY
Qty: 90 CAPSULE | Refills: 3 | Status: SHIPPED | OUTPATIENT
Start: 2025-04-28

## 2025-04-28 RX ORDER — CHOLECALCIFEROL (VITAMIN D3) 25 MCG
1000 TABLET ORAL DAILY
Qty: 90 TABLET | Refills: 3 | Status: SHIPPED | OUTPATIENT
Start: 2025-04-28

## 2025-04-28 RX ORDER — POTASSIUM CHLORIDE 1500 MG/1
20 TABLET, EXTENDED RELEASE ORAL DAILY
Qty: 90 TABLET | Refills: 3 | Status: SHIPPED | OUTPATIENT
Start: 2025-04-28

## 2025-04-28 RX ORDER — SENNOSIDES A AND B 8.6 MG/1
1 TABLET, FILM COATED ORAL 2 TIMES DAILY
Qty: 180 TABLET | Refills: 3 | Status: SHIPPED | OUTPATIENT
Start: 2025-04-28 | End: 2026-04-28

## 2025-04-29 DIAGNOSIS — L29.9 ITCHING: ICD-10-CM

## 2025-04-29 RX ORDER — HYDROXYZINE HYDROCHLORIDE 25 MG/1
25 TABLET, FILM COATED ORAL EVERY 6 HOURS PRN
Qty: 120 TABLET | Refills: 3 | Status: SHIPPED | OUTPATIENT
Start: 2025-04-29 | End: 2025-04-29 | Stop reason: SDUPTHER

## 2025-04-30 RX ORDER — HYDROXYZINE HYDROCHLORIDE 25 MG/1
25 TABLET, FILM COATED ORAL EVERY 6 HOURS PRN
Qty: 120 TABLET | Refills: 3 | Status: SHIPPED | OUTPATIENT
Start: 2025-04-30

## 2025-05-21 ENCOUNTER — PATIENT MESSAGE (OUTPATIENT)
Dept: PRIMARY CARE CLINIC | Age: 80
End: 2025-05-21

## 2025-05-21 DIAGNOSIS — R31.9 HEMATURIA, UNSPECIFIED TYPE: Primary | ICD-10-CM

## 2025-06-01 ENCOUNTER — PATIENT MESSAGE (OUTPATIENT)
Dept: PRIMARY CARE CLINIC | Age: 80
End: 2025-06-01

## 2025-06-06 RX ORDER — TAMSULOSIN HYDROCHLORIDE 0.4 MG/1
0.4 CAPSULE ORAL DAILY
Qty: 90 CAPSULE | Refills: 3 | Status: SHIPPED | OUTPATIENT
Start: 2025-06-06

## 2025-06-06 RX ORDER — CHOLECALCIFEROL (VITAMIN D3) 25 MCG
1000 TABLET ORAL DAILY
Qty: 90 TABLET | Refills: 3 | Status: SHIPPED | OUTPATIENT
Start: 2025-06-06

## 2025-06-09 RX ORDER — GABAPENTIN 100 MG/1
100 CAPSULE ORAL 2 TIMES DAILY
Qty: 60 CAPSULE | Refills: 5 | Status: SHIPPED | OUTPATIENT
Start: 2025-06-09 | End: 2025-12-06

## 2025-07-06 ENCOUNTER — PATIENT MESSAGE (OUTPATIENT)
Dept: PRIMARY CARE CLINIC | Age: 80
End: 2025-07-06

## 2025-07-07 RX ORDER — GABAPENTIN 100 MG/1
100 CAPSULE ORAL 2 TIMES DAILY
Qty: 60 CAPSULE | Refills: 5 | Status: SHIPPED | OUTPATIENT
Start: 2025-07-07 | End: 2026-01-03

## 2025-08-16 ENCOUNTER — PATIENT MESSAGE (OUTPATIENT)
Dept: PRIMARY CARE CLINIC | Age: 80
End: 2025-08-16

## 2025-08-18 RX ORDER — GABAPENTIN 100 MG/1
100 CAPSULE ORAL 2 TIMES DAILY
Qty: 60 CAPSULE | Refills: 5 | Status: SHIPPED | OUTPATIENT
Start: 2025-08-18 | End: 2026-02-14

## 2025-09-04 ENCOUNTER — PATIENT MESSAGE (OUTPATIENT)
Dept: PRIMARY CARE CLINIC | Age: 80
End: 2025-09-04

## 2025-09-04 DIAGNOSIS — I10 ESSENTIAL HYPERTENSION: ICD-10-CM

## 2025-09-04 DIAGNOSIS — I50.22 CHRONIC SYSTOLIC CONGESTIVE HEART FAILURE, NYHA CLASS 3 (HCC): Primary | ICD-10-CM

## 2025-09-04 DIAGNOSIS — E87.8 ELECTROLYTE IMBALANCE: ICD-10-CM
